# Patient Record
Sex: FEMALE | Race: BLACK OR AFRICAN AMERICAN | Employment: UNEMPLOYED | URBAN - METROPOLITAN AREA
[De-identification: names, ages, dates, MRNs, and addresses within clinical notes are randomized per-mention and may not be internally consistent; named-entity substitution may affect disease eponyms.]

---

## 2017-01-09 ENCOUNTER — OFFICE VISIT (OUTPATIENT)
Dept: PAIN MANAGEMENT | Age: 62
End: 2017-01-09

## 2017-01-09 VITALS
WEIGHT: 174 LBS | BODY MASS INDEX: 32.02 KG/M2 | HEIGHT: 62 IN | SYSTOLIC BLOOD PRESSURE: 157 MMHG | DIASTOLIC BLOOD PRESSURE: 101 MMHG | HEART RATE: 76 BPM

## 2017-01-09 DIAGNOSIS — M51.37 DEGENERATION OF LUMBAR OR LUMBOSACRAL INTERVERTEBRAL DISC: ICD-10-CM

## 2017-01-09 DIAGNOSIS — M54.16 LUMBAR NEURITIS: ICD-10-CM

## 2017-01-09 DIAGNOSIS — G89.29 CHRONIC RIGHT-SIDED LOW BACK PAIN WITHOUT SCIATICA: ICD-10-CM

## 2017-01-09 DIAGNOSIS — M96.1 LUMBAR POST-LAMINECTOMY SYNDROME: ICD-10-CM

## 2017-01-09 DIAGNOSIS — M47.816 LUMBAR FACET ARTHROPATHY: Primary | ICD-10-CM

## 2017-01-09 DIAGNOSIS — M25.50 PAIN IN JOINT, MULTIPLE SITES: ICD-10-CM

## 2017-01-09 DIAGNOSIS — M54.50 CHRONIC RIGHT-SIDED LOW BACK PAIN WITHOUT SCIATICA: ICD-10-CM

## 2017-01-09 DIAGNOSIS — M25.519 ARTHRALGIA OF SHOULDER, UNSPECIFIED LATERALITY: ICD-10-CM

## 2017-01-09 DIAGNOSIS — M96.1 POSTLAMINECTOMY SYNDROME, LUMBAR REGION: ICD-10-CM

## 2017-01-09 RX ORDER — OXYCODONE AND ACETAMINOPHEN 10; 325 MG/1; MG/1
1 TABLET ORAL
Qty: 90 TAB | Refills: 0 | Status: SHIPPED | OUTPATIENT
Start: 2017-02-24 | End: 2017-02-16 | Stop reason: SDUPTHER

## 2017-01-09 RX ORDER — GABAPENTIN 300 MG/1
300 CAPSULE ORAL 3 TIMES DAILY
Qty: 90 CAP | Refills: 5 | Status: SHIPPED | OUTPATIENT
Start: 2017-01-09 | End: 2017-03-27

## 2017-01-09 RX ORDER — OXYCODONE AND ACETAMINOPHEN 10; 325 MG/1; MG/1
1 TABLET ORAL
Qty: 90 TAB | Refills: 0 | Status: SHIPPED | OUTPATIENT
Start: 2017-01-26 | End: 2017-01-09 | Stop reason: SDUPTHER

## 2017-01-09 RX ORDER — FENTANYL 75 UG/H
1 PATCH TRANSDERMAL
Qty: 15 PATCH | Refills: 0 | Status: SHIPPED | OUTPATIENT
Start: 2017-02-09 | End: 2017-02-16 | Stop reason: DRUGHIGH

## 2017-01-09 RX ORDER — FENTANYL 75 UG/H
1 PATCH TRANSDERMAL
Qty: 15 PATCH | Refills: 0 | Status: SHIPPED | OUTPATIENT
Start: 2017-01-11 | End: 2017-01-09 | Stop reason: SDUPTHER

## 2017-01-09 NOTE — PROGRESS NOTES
HISTORY OF PRESENT ILLNESS  Trini Bonilla is a 63 y.o. female. Patient presents for follow up of chronic pain due to lumbar postlaminectomy syndrome and polyarthralgias, related to an industrial accident in [facility]. She is also s/p right-sided mastectomy secondary to invasive ductal carcinoma. She is s/p lumbar fusion revision with Dr. Ratna Hernandez on 5/3/16. She recently underwent left sided lumbar RFA with Dr. Howard Vernon on 12/19 and is slated for the right side on 1/12/17. She has noted already significant improvement in left sided back pain, but she notes that right sided back pain remains very high. She describes this pain as aching, stabbing, stiff, and tender. This has improved since surgery, however. Pt reports average of 5-6/10 pain scale most days, but reports that with medications, her pain will reduce to 3-4/10. We will re-evaluate 4 weeks after her right sided RFA to start weaning down on her medications, which has been high on the priority list for her worker's compensation insurance company. Medications continue to work well for pain control overall. Trini Bonilla is tolerating medications well, with no untoward side effects noted. She is able to stay more active with less discomfort with these current doses. The patient reports an average of 70% relief with this regimen. Most recent UDS and  were consistent with prescribed medications. Pill counts are appropriate. She is informed of side effects, risks, and benefits of this regimen, and emphasizes that she derives a significant improvement in functionality and quality of life, and notes that non-opioid medications and therapies in the past have not offered significant benefit. She denies new or worsening insomnia or constipation issues. She denies any falls, injuries, or hospitalizations since the last visit. HPI--see above    ROS  Constitutional: Positive for fever, chills and malaise/fatigue. HENT: Positive for neck pain.     Musculoskeletal: Positive for myalgias, back pain and joint pain. Neurological: Positive for headaches. Psychiatric/Behavioral: Negative for depression. The patient is not nervous/anxious and does not have insomnia. Physical Exam  Constitutional: She is oriented to person, place, and time. She appears well-developed and well-nourished. in better spirits  Musculoskeletal:   Marked spasms of cervical paraspinous and trapezius musculature noted        Right shoulder: She exhibits tenderness. Left shoulder: She exhibits tenderness. Lumbar back: She exhibits decreased range of motion, tenderness, pain and spasm. Back:           Arms:  Brisk sacroiliac tenderness noted  Marked spasms of lumbar paraspinous musculature noted  Wearing back brace       Right hand: She exhibits tenderness. Left hand: She exhibits tenderness. Neurological: She is alert and oriented to person, place, and time. No cranial nerve deficit. She exhibits normal muscle tone. Coordination normal.   Psychiatric: She has a normal mood and affect. Her behavior is normal. Judgment and thought content normal.     ASSESSMENT and PLAN    ICD-10-CM ICD-9-CM    1. Lumbar facet arthropathy (HCC) M12.88 721.3    2. Chronic right-sided low back pain without sciatica M54.5 724.2     G89.29 338.29    3. Lumbar neuritis M54.16 724.4    4. Postlaminectomy syndrome, lumbar region M96.1 722.83    5. Degeneration of lumbar intervertebral disc M51.37 722.52    6. Pain in joint, multiple sites M25.50 719.49    7. Arthralgia of shoulder, unspecified laterality M25.519 719.41    8.  Lumbar post-laminectomy syndrome M96.1 722.83       Plan:  Continue same medications as prescribed for chronic pain  Follow up in 3 months or sooner if needed  Regular exercise and attention to emotional health and diet remain the most effective ways to treat chronic pain of all kinds  You may contact me with questions or concerns through [address]

## 2017-01-09 NOTE — MR AVS SNAPSHOT
Visit Information Date & Time Provider Department Dept. Phone Encounter #  
 1/9/2017  4:20 PM Juliana Cavanaugh [address] for [facility] [id number] Follow-up Instructions Return in about 6 weeks (around 2/20/2017). Follow-up and Disposition History Your Appointments 1/9/2017  4:20 PM  
Follow Up with Marysol Kothari PA-C [address] for [facility] (WOLF SCHEDULING) Appt Note: 2 mth f/u; 2 mth f/u  
 [address] [facility] [address]  
[phone]  [address] [id number] [id number] Upcoming Health Maintenance Date Due Hepatitis C Screening 1955 Pneumococcal 19-64 Highest Risk (1 of 3 - PCV13) 12/23/1976 BREAST CANCER SCRN MAMMOGRAM 10/29/2015 INFLUENZA AGE 9 TO ADULT 8/1/2016 PAP AKA CERVICAL CYTOLOGY 12/13/2019 COLONOSCOPY 3/15/2023 DTaP/Tdap/Td series (2 - Td) 4/1/2025 Allergies as of 1/9/2017  Review Complete On: 1/9/2017 By: Fariha Muñiz LPN Severity Noted Reaction Type Reactions Adhesive    Rash Aspirin  06/20/2011    Other (comments), Nausea and Vomiting G6PD 
GI BLEED AND ULCER Contrast Agent [Iodine]    Rash Allergic to CT Dye  
 Dilantin [Phenytoin Sodium Extended]    Other (comments) Difficulty waking Iodinated Contrast Media - Oral And Iv Dye  05/03/2016    Rash \"bumps on face\" Lipitor [Atorvastatin]  12/13/2016    Other (comments) PKU Pcn [Penicillins]    Rash, Hives \"sores all over the body\" Phenytoin  05/03/2016    Unknown (comments) \"Trouble waking up\" Sulfa (Sulfonamide Antibiotics)    Rash, Nausea and Vomiting G6PD 
G6PD Tegretol [Carbamazepine]    Other (comments), Vertigo \"Trouble waking up\" Difficulty waking up Current Immunizations  Reviewed on 11/25/2015 Name Date Influenza Vaccine 11/25/2015  7:48 AM  
 Tdap 4/1/2015 Not reviewed this visit You Were Diagnosed With   
  
 Codes Comments Lumbar facet arthropathy (HCC)    -  Primary ICD-10-CM: M12.88 ICD-9-CM: 101. 3 Chronic right-sided low back pain without sciatica     ICD-10-CM: M54.5, G89.29 ICD-9-CM: 724.2, 338.29 Lumbar neuritis     ICD-10-CM: M54.16 
ICD-9-CM: 724.4 Postlaminectomy syndrome, lumbar region     ICD-10-CM: M96.1 ICD-9-CM: 722.83 Degeneration of lumbar or lumbosacral intervertebral disc     ICD-10-CM: M51.37 
ICD-9-CM: 722.52 Pain in joint, multiple sites     ICD-10-CM: M25.50 ICD-9-CM: 719.49 Arthralgia of shoulder, unspecified laterality     ICD-10-CM: M25.519 ICD-9-CM: 719.41 Lumbar post-laminectomy syndrome     ICD-10-CM: M96.1 ICD-9-CM: 722.83 Vitals BP Pulse Height(growth percentile) Weight(growth percentile) LMP BMI  
 (!) 157/101 76 5' 2\" (1.575 m) 174 lb (78.9 kg) 08/28/2002 31.83 kg/m2 OB Status Smoking Status Postmenopausal Never Smoker Vitals History BMI and BSA Data Body Mass Index Body Surface Area  
 31.83 kg/m 2 1.86 m 2 Preferred Pharmacy Pharmacy Name Phone [facility] [place] [address] [phone] [facility] [phone] Your Updated Medication List  
  
   
This list is accurate as of: 1/9/17  2:16 PM.  Always use your most recent med list.  
  
  
  
  
 acetaminophen 500 mg tablet Commonly known as:  TYLENOL Take  by mouth every six (6) hours as needed for Pain. CLARITIN 10 mg tablet Generic drug:  loratadine Take 10 mg by mouth daily as needed. doxycycline 100 mg capsule Commonly known as:  VIBRAMYCIN Take 1 capsule twice daily with food for 10 days, remain upright for 45 minutes after each dose. ergocalciferol 50,000 unit capsule Commonly known as:  ERGOCALCIFEROL Take 1 Cap by mouth every seven (7) days. fentaNYL 75 mcg/hr Commonly known as:  Emily Kramer  
 1 Patch by TransDERmal route every fourty-eight (48) hours for 30 days. Max Daily Amount: 1 Patch. for chronic, severe, refractory pain. Mylan brand only  Indications: CHRONIC PAIN WITH OPIOID TOLERANCE, SEVERE PAIN WITH OPIOID TOLERANCE Start taking on:    
  
 folic acid 1 mg tablet Commonly known as:  Google Take 1 mg by mouth two (2) times a day.  
  
 gabapentin 300 mg capsule Commonly known as:  NEURONTIN Take 1 Cap by mouth three (3) times daily. As needed for nerve pain  
  
 levothyroxine 100 mcg tablet Commonly known as:  SYNTHROID  
TAKE 1 TABLET BY MOUTH DAILY (BEFORE BREAKFAST) methocarbamol 750 mg tablet Commonly known as:  ROBAXIN Take 1 Tab by mouth three (3) times daily. As needed for muscle spasms NASONEX 50 mcg/actuation nasal spray Generic drug:  mometasone 2 Sprays daily as needed. ondansetron hcl 8 mg tablet Commonly known as:  Ricke Beagle Take 8 mg by mouth. OTHER Apply  to affected area three (3) times daily as needed. VA-CMOD1 compounded cream from [facility]  
  
 oxyCODONE-acetaminophen  mg per tablet Commonly known as:  PERCOCET 10 Take 1 Tab by mouth three (3) times daily as needed for up to 30 days. Max Daily Amount: 3 Tabs. For breakthrough pain  Indications: PAIN Start taking on:  2017  
  
 senna-docusate 8.6-50 mg per tablet Commonly known as:  Joelyn Muster Take 2 Tabs by mouth daily. For chronic constipation Transparent Dressings 3 1/2 X 4 \" Bndg Commonly known as:  TEGADERM  
1 Patch by Apply Externally route every fourty-eight (48) hours. ZANTAC 150 mg tablet Generic drug:  raNITIdine Take 150 mg by mouth daily as needed. Prescriptions Printed Refills Transparent Dressings (TEGADERM) 3 1/2 X 4 \" bndg 5 Si Patch by Apply Externally route every fourty-eight (48) hours. Class: Print  Route: Apply Externally  
 fentaNYL (DURAGESIC) 75 mcg/hr 0  
 Starting on: 2017 Si Patch by TransDERmal route every fourty-eight (48) hours for 30 days. Max Daily Amount: 1 Patch. for chronic, severe, refractory pain. Mylan brand only  Indications: CHRONIC PAIN WITH OPIOID TOLERANCE, SEVERE PAIN WITH OPIOID TOLERANCE Class: Print Route: TransDERmal  
 oxyCODONE-acetaminophen (PERCOCET 10)  mg per tablet 0 Starting on: 2017 Sig: Take 1 Tab by mouth three (3) times daily as needed for up to 30 days. Max Daily Amount: 3 Tabs. For breakthrough pain  Indications: PAIN Class: Print Route: Oral  
  
Prescriptions Sent to Pharmacy Refills  
 gabapentin (NEURONTIN) 300 mg capsule 5 Sig: Take 1 Cap by mouth three (3) times daily. As needed for nerve pain  
 Class: Normal  
 Pharmacy: [facility]/pharmacy #[id number]- [place], [address] [place] [address] #: [phone] Route: Oral  
  
Follow-up Instructions Return in about 6 weeks (around 2017). Patient Instructions Plan: 
Continue same medications as prescribed for chronic pain Follow up in 3 months or sooner if needed Regular exercise and attention to emotional health and diet remain the most effective ways to treat chronic pain of all kinds You may contact me with questions or concerns through [address] Introducing [facility]! [facility] introduces [employer] patient portal. Now you can access parts of your medical record, email your doctor's office, and request medication refills online. 1. In your internet browser, go to [email or web address] 2. Click on the First Time User? Click Here link in the Sign In box. You will see the New Member Sign Up page. 3. Enter your [employer] Access Code exactly as it appears below. You will not need to use this code after youve completed the sign-up process. If you do not sign up before the expiration date, you must request a new code.  
 
· [employer] Access Code: [email or web address] 
 Expires: 1/19/2017  2:38 PM 
 
4. Enter the last four digits of your Social Security Number (xxxx) and Date of Birth (mm/dd/yyyy) as indicated and click Submit. You will be taken to the next sign-up page. 5. Create a [employer] ID. This will be your [employer] login ID and cannot be changed, so think of one that is secure and easy to remember. 6. Create a [employer] password. You can change your password at any time. 7. Enter your Password Reset Question and Answer. This can be used at a later time if you forget your password. 8. Enter your e-mail address. You will receive e-mail notification when new information is available in [address] 9. Click Sign Up. You can now view and download portions of your medical record. 10. Click the Download Summary menu link to download a portable copy of your medical information. If you have questions, please visit the Frequently Asked Questions section of the [employer] website. Remember, [employer] is NOT to be used for urgent needs. For medical emergencies, dial 911. Now available from your iPhone and Android! Please provide this summary of care documentation to your next provider. Your primary care clinician is listed as Mahesh 51. If you have any questions after today's visit, please call [phone].

## 2017-01-09 NOTE — PROGRESS NOTES
Nursing Notes    Patient presents to the office today in follow-up. Patient rates her pain at 6/10 on the numerical pain scale. Reviewed medications with counts as follows:    Rx Date filled Qty Dispensed Pill Count Last Dose Short   Fentanyl 75 mcg/hr  12/15/16 15 3 Current patch on right side of torso no   Percocet 10/325 mg 12/28/16 90 60 today no                            POC UDS was not performed in office today. Any new labs or imaging since last appointment? NO    Have you been to an emergency room (ER) or urgent care clinic since your last visit? YES. Pt went to an urgent care for whole body pain, dyspnea, SOB. Have you been hospitalized since your last visit? NO     If yes, where, when, and reason for visit? Have you seen or consulted any other health care providers outside of the [address] [place] [address]  since your last visit? YES     If yes, where, when, and reason for visit? Dr. Vernell Rodriguez      deferred to pcp.

## 2017-02-09 ENCOUNTER — OFFICE VISIT (OUTPATIENT)
Dept: FAMILY MEDICINE CLINIC | Age: 62
End: 2017-02-09

## 2017-02-09 VITALS
HEIGHT: 62 IN | BODY MASS INDEX: 32.02 KG/M2 | SYSTOLIC BLOOD PRESSURE: 142 MMHG | HEART RATE: 85 BPM | OXYGEN SATURATION: 97 % | RESPIRATION RATE: 16 BRPM | DIASTOLIC BLOOD PRESSURE: 90 MMHG | WEIGHT: 174 LBS | TEMPERATURE: 98.2 F

## 2017-02-09 DIAGNOSIS — J01.00 ACUTE NON-RECURRENT MAXILLARY SINUSITIS: Primary | ICD-10-CM

## 2017-02-09 DIAGNOSIS — J30.1 SEASONAL ALLERGIC RHINITIS DUE TO POLLEN: ICD-10-CM

## 2017-02-09 RX ORDER — LEVOFLOXACIN 500 MG/1
500 TABLET, FILM COATED ORAL DAILY
Qty: 7 TAB | Refills: 0 | Status: SHIPPED | OUTPATIENT
Start: 2017-02-09 | End: 2017-02-16

## 2017-02-09 RX ORDER — FLUTICASONE PROPIONATE 50 MCG
SPRAY, SUSPENSION (ML) NASAL
Qty: 1 BOTTLE | Refills: 2 | Status: SHIPPED | OUTPATIENT
Start: 2017-02-09 | End: 2017-08-04 | Stop reason: ALTCHOICE

## 2017-02-09 NOTE — PROGRESS NOTES
Sarah Rider is a 63 y.o. female sinus pain and cough thick mucus     1. Have you been to the ER, urgent care clinic or hospitalized since your last visit? NO.     2. Have you seen or consulted any other health care providers outside of the [address] [place] [address] since your last visit (Include any pap smears or colon screening)? NO      Do you have an Advanced Directive? NO    Would you like information on Advanced Directives?  NO    Learning Assessment 3/10/2015   PRIMARY LEARNER Patient   HIGHEST LEVEL OF EDUCATION - PRIMARY LEARNER  > 4 YEARS OF COLLEGE   BARRIERS PRIMARY LEARNER NONE   CO-LEARNER CAREGIVER No   PRIMARY LANGUAGE ENGLISH   LEARNER PREFERENCE PRIMARY DEMONSTRATION   ANSWERED BY self   RELATIONSHIP SELF

## 2017-02-09 NOTE — MR AVS SNAPSHOT
Visit Information Date & Time Provider Department Dept. Phone Encounter #  
 2/9/2017  3:15 PM Reina Gamboa, Coreen [facility] [phone] [id number] Your Appointments 2/16/2017  1:20 PM  
Follow Up with Amy Pozo PA-C [address] [place] [address] for Pain Management (WOLF SCHEDULING) Appt Note: Return in about 6 weeks (around 2/20/2017; Fidel F/U w/Provider [facility] [address]  
[phone] [address]  
  
    
 2/16/2017  2:00 PM  
Follow Up with Amy Pozo PA-C [address] [place] [address] for Pain Management (WOLF SCHEDULING) Appt Note:  slot [address] [facility] [address] [phone] Upcoming Health Maintenance Date Due Hepatitis C Screening 1955 Pneumococcal 19-64 Highest Risk (1 of 3 - PCV13) 12/23/1976 BREAST CANCER SCRN MAMMOGRAM 10/29/2015 INFLUENZA AGE 9 TO ADULT 8/1/2016 PAP AKA CERVICAL CYTOLOGY 12/13/2019 COLONOSCOPY 3/15/2023 DTaP/Tdap/Td series (2 - Td) 4/1/2025 Allergies as of 2/9/2017  Review Complete On: 2/9/2017 By: Donna Hall LPN Severity Noted Reaction Type Reactions Adhesive    Rash Aspirin  06/20/2011    Other (comments), Nausea and Vomiting G6PD 
GI BLEED AND ULCER Contrast Agent [Iodine]    Rash Allergic to CT Dye  
 Dilantin [Phenytoin Sodium Extended]    Other (comments) Difficulty waking Iodinated Contrast Media - Oral And Iv Dye  05/03/2016    Rash \"bumps on face\" Lipitor [Atorvastatin]  12/13/2016    Other (comments) PKU Pcn [Penicillins]    Rash, Hives \"sores all over the body\" Phenytoin  05/03/2016    Unknown (comments) \"Trouble waking up\" Sulfa (Sulfonamide Antibiotics)    Rash, Nausea and Vomiting G6PD 
G6PD Tegretol [Carbamazepine]    Other (comments), Vertigo \"Trouble waking up\" Difficulty waking up Current Immunizations  Reviewed on 11/25/2015 Name Date Influenza Vaccine 11/25/2015  7:48 AM  
 Tdap 4/1/2015 Not reviewed this visit You Were Diagnosed With   
  
 Codes Comments Acute non-recurrent maxillary sinusitis    -  Primary ICD-10-CM: J01.00 ICD-9-CM: 461.0 Seasonal allergic rhinitis due to pollen     ICD-10-CM: J30.1 ICD-9-CM: 477.0 Vitals BP Pulse Temp Resp Height(growth percentile) Weight(growth percentile) 142/90 (BP 1 Location: Left arm) 85 98.2 °F (36.8 °C) (Oral) 16 5' 2\" (1.575 m) 174 lb (78.9 kg) LMP SpO2 BMI OB Status Smoking Status 08/28/2002 97% 31.83 kg/m2 Postmenopausal Never Smoker Vitals History BMI and BSA Data Body Mass Index Body Surface Area  
 31.83 kg/m 2 1.86 m 2 Preferred Pharm[facility] [address] [phone] Your Updated Medication List  
  
   
This list is accurate as of: 2/9/17  3:50 PM.  Always use your most recent med list.  
  
  
  
  
 acetaminophen 500 mg tablet Commonly known as:  TYLENOL Take  by mouth every six (6) hours as needed for Pain. CLARITIN 10 mg tablet Generic drug:  loratadine Take 10 mg by mouth daily as needed. doxycycline 100 mg capsule Commonly known as:  VIBRAMYCIN Take 1 capsule twice daily with food for 10 days, remain upright for 45 minutes after each dose. ergocalciferol 50,000 unit capsule Commonly known as:  ERGOCALCIFEROL Take 1 Cap by mouth every seven (7) days. fentaNYL 75 mcg/hr Commonly known as:  DURAGESIC  
1 Patch by TransDERmal route every fourty-eight (48) hours for 30 days. Max Daily Amount: 1 Patch. for chronic, severe, refractory pain. Mylan brand only  Indications: CHRONIC PAIN WITH OPIOID TOLERANCE, SEVERE PAIN WITH OPIOID TOLERANCE  
  
 fluticasone 50 mcg/actuation nasal spray Commonly known as:  Denzel Sacks Two sprays in each nostril daily. folic acid 1 mg tablet Commonly known as:  Google Take 1 mg by mouth two (2) times a day.  
  
 gabapentin 300 mg capsule Commonly known as:  NEURONTIN Take 1 Cap by mouth three (3) times daily. As needed for nerve pain  
  
 levoFLOXacin 500 mg tablet Commonly known as:  Fernando Garcia Take 1 Tab by mouth daily for 7 days. levothyroxine 100 mcg tablet Commonly known as:  SYNTHROID  
TAKE 1 TABLET BY MOUTH DAILY (BEFORE BREAKFAST) methocarbamol 750 mg tablet Commonly known as:  ROBAXIN Take 1 Tab by mouth three (3) times daily. As needed for muscle spasms NASONEX 50 mcg/actuation nasal spray Generic drug:  mometasone 2 Sprays daily as needed. ondansetron hcl 8 mg tablet Commonly known as:  Marrion Mend Take 8 mg by mouth. OTHER Apply  to affected area three (3) times daily as needed. VA-CMOD1 compounded cream from [facility]  
  
 oxyCODONE-acetaminophen  mg per tablet Commonly known as:  PERCOCET 10 Take 1 Tab by mouth three (3) times daily as needed for up to 30 days. Max Daily Amount: 3 Tabs. For breakthrough pain  Indications: PAIN Start taking on:  2/24/2017  
  
 senna-docusate 8.6-50 mg per tablet Commonly known as:  Leellen Cancel Take 2 Tabs by mouth daily. For chronic constipation Transparent Dressings 3 1/2 X 4 \" Bndg Commonly known as:  TEGADERM  
1 Patch by Apply Externally route every fourty-eight (48) hours. ZANTAC 150 mg tablet Generic drug:  raNITIdine Take 150 mg by mouth daily as needed. Prescriptions Sent to Pharmacy Refills  
 levoFLOXacin (LEVAQUIN) 500 mg tablet 0 Sig: Take 1 Tab by mouth daily for 7 days. Class: Normal  
 Pharmacy: [address] [facility], [address] [facility] #: [phone] Route: Oral  
 fluticasone (FLONASE) 50 mcg/actuation nasal spray 2 Sig: Two sprays in each nostril daily. Class: Normal  
 [facility] [address] [facility] [address] [facility] [phone] Patient Instructions Sinusitis: Care Instructions Your Care Instructions Sinusitis is an infection of the lining of the sinus cavities in your head. Sinusitis often follows a cold. It causes pain and pressure in your head and face. In most cases, sinusitis gets better on its own in 1 to 2 weeks. But some mild symptoms may last for several weeks. Sometimes antibiotics are needed. Follow-up care is a key part of your treatment and safety. Be sure to make and go to all appointments, and call your doctor if you are having problems. It's also a good idea to know your test results and keep a list of the medicines you take. How can you care for yourself at home? · Take an over-the-counter pain medicine, such as acetaminophen (Tylenol), ibuprofen (Advil, Motrin), or naproxen (Aleve). Read and follow all instructions on the label. · If the doctor prescribed antibiotics, take them as directed. Do not stop taking them just because you feel better. You need to take the full course of antibiotics. · Be careful when taking over-the-counter cold or flu medicines and Tylenol at the same time. Many of these medicines have acetaminophen, which is Tylenol. Read the labels to make sure that you are not taking more than the recommended dose. Too much acetaminophen (Tylenol) can be harmful. · Breathe warm, moist air from a steamy shower, a hot bath, or a sink filled with hot water. Avoid cold, dry air. Using a humidifier in your home may help. Follow the directions for cleaning the machine. · Use saline (saltwater) nasal washes to help keep your nasal passages open and wash out mucus and bacteria. You can buy saline nose drops at a grocery store or drugstore.  Or you can make your own at home by adding 1 teaspoon of salt and 1 teaspoon of baking soda to 2 cups of distilled water. If you make your own, fill a bulb syringe with the solution, insert the tip into your nostril, and squeeze gently. F[place] your nose. · Put a hot, wet towel or a warm gel pack on your face 3 or 4 times a day for 5 to 10 minutes each time. · Try a decongestant nasal spray like oxymetazoline (Afrin). Do not use it for more than 3 days in a row. Using it for more than 3 days can make your congestion worse. When should you call for help? Call your doctor now or seek immediate medical care if: 
· You have new or worse swelling or redness in your face or around your eyes. · You have a new or higher fever. Watch closely for changes in your health, and be sure to contact your doctor if: 
· You have new or worse facial pain. · The mucus from your nose becomes thicker (like pus) or has new blood in it. · You are not getting better as expected. Where can you learn more? Go to http://celia-staci.info/. Enter W757 in the search box to learn more about \"Sinusitis: Care Instructions. \" Current as of: July 29, 2016 Content Version: 11.1 © 9617-1315 SpotMe. Care instructions adapted under license by [employer] (which disclaims liability or warranty for this information). If you have questions about a medical condition or this instruction, always ask your healthcare professional. [place] [address] any warranty or liability for your use of this information. Introducing [facility]! Aniyah Overton introduces All Together Now patient portal. Now you can access parts of your medical record, email your doctor's office, and request medication refills online. 1. In your internet browser, go to [email or web address] 2. Click on the First Time User? Click Here link in the Sign In box. You will see the New Member Sign Up page. 3. Enter your All Together Now Access Code exactly as it appears below.  You will not need to use this code after youve completed the sign-up process. If you do not sign up before the expiration date, you must request a new code. · Medigo Access Code: [email or web address] Expires: 4/23/2017  2:13 PM 
 
4. Enter the last four digits of your Social Security Number (xxxx) and Date of Birth (mm/dd/yyyy) as indicated and click Submit. You will be taken to the next sign-up page. 5. Create a Medigo ID. This will be your Medigo login ID and cannot be changed, so think of one that is secure and easy to remember. 6. Create a Medigo password. You can change your password at any time. 7. Enter your Password Reset Question and Answer. This can be used at a later time if you forget your password. 8. Enter your e-mail address. You will receive e-mail notification when new information is available in [address]. 9. Click Sign Up. You can now view and download portions of your medical record. 10. Click the Download Summary menu link to download a portable copy of your medical information. If you have questions, please visit the Frequently Asked Questions section of the Medigo website. Remember, Medigo is NOT to be used for urgent needs. For medical emergencies, dial 911. Now available from your iPhone and Android! Please provide this summary of care documentation to your next provider. Your primary care clinician is listed as Mahesh 51. If you have any questions after today's visit, please call [phone].

## 2017-02-09 NOTE — PATIENT INSTRUCTIONS
Sinusitis: Care Instructions  Your Care Instructions    Sinusitis is an infection of the lining of the sinus cavities in your head. Sinusitis often follows a cold. It causes pain and pressure in your head and face. In most cases, sinusitis gets better on its own in 1 to 2 weeks. But some mild symptoms may last for several weeks. Sometimes antibiotics are needed. Follow-up care is a key part of your treatment and safety. Be sure to make and go to all appointments, and call your doctor if you are having problems. It's also a good idea to know your test results and keep a list of the medicines you take. How can you care for yourself at home? · Take an over-the-counter pain medicine, such as acetaminophen (Tylenol), ibuprofen (Advil, Motrin), or naproxen (Aleve). Read and follow all instructions on the label. · If the doctor prescribed antibiotics, take them as directed. Do not stop taking them just because you feel better. You need to take the full course of antibiotics. · Be careful when taking over-the-counter cold or flu medicines and Tylenol at the same time. Many of these medicines have acetaminophen, which is Tylenol. Read the labels to make sure that you are not taking more than the recommended dose. Too much acetaminophen (Tylenol) can be harmful. · Breathe warm, moist air from a steamy shower, a hot bath, or a sink filled with hot water. Avoid cold, dry air. Using a humidifier in your home may help. Follow the directions for cleaning the machine. · Use saline (saltwater) nasal washes to help keep your nasal passages open and wash out mucus and bacteria. You can buy saline nose drops at a grocery store or drugstore. Or you can make your own at home by adding 1 teaspoon of salt and 1 teaspoon of baking soda to 2 cups of distilled water. If you make your own, fill a bulb syringe with the solution, insert the tip into your nostril, and squeeze gently. [place] your nose.   · Put a hot, wet towel or a warm gel pack on your face 3 or 4 times a day for 5 to 10 minutes each time. · Try a decongestant nasal spray like oxymetazoline (Afrin). Do not use it for more than 3 days in a row. Using it for more than 3 days can make your congestion worse. When should you call for help? Call your doctor now or seek immediate medical care if:  · You have new or worse swelling or redness in your face or around your eyes. · You have a new or higher fever. Watch closely for changes in your health, and be sure to contact your doctor if:  · You have new or worse facial pain. · The mucus from your nose becomes thicker (like pus) or has new blood in it. · You are not getting better as expected. Where can you learn more? Go to http://celia-staci.info/. Enter Y203 in the search box to learn more about \"Sinusitis: Care Instructions. \"  Current as of: July 29, 2016  Content Version: 11.1  © 8055-2736 Multimedia Plus | [employer]. Care instructions adapted under license by op5 (which disclaims liability or warranty for this information). If you have questions about a medical condition or this instruction, always ask your healthcare professional. [place] [address] any warranty or liability for your use of this information.

## 2017-02-09 NOTE — PROGRESS NOTES
HISTORY OF PRESENT ILLNESS  Delroy Bunch is a 63 y.o. female. HPI patient reports on going sinus pressure, pain with colored nasal drainage the past 2 months unresponsive to OTC. She requested a same day appointment and presented 25 minutes late but we were able to fit her into the schedule. Symptom onset gradual , timing constant, pain mild to moderate. Past Medical History   Diagnosis Date    Anemia     Asthma      on allergy shots, no inhalaers    Back injury     Back pain     Breast cancer ([facility] [address] 12/6/2011     Dr. Keshia Tolentino; Dr. Marlen Garzon easily     Carpal tunnel syndrome, right      EMG done only in left however    Chronic pain      pelvic pain    Constipation     G6PD (glucose 6 phosphatase deficiency)     Gastroparesis     GERD (gastroesophageal reflux disease)      acid reflux    H/O colonoscopy 3/15/2013     Dr. Esperanza Rojas Hypertension      no mediacations, doctor is monitoring    Hyperthyroidism      Grave's- radiation    IBS (irritable bowel syndrome)     Lymphedema 2012     in her right underarm    Numbness      legs    Osteopenia 3/19/2013    Other and unspecified hyperlipidemia     PUD (peptic ulcer disease)      pyloric ulcer    Unspecified hypothyroidism        Current Outpatient Prescriptions:     levoFLOXacin (LEVAQUIN) 500 mg tablet, Take 1 Tab by mouth daily for 7 days. , Disp: 7 Tab, Rfl: 0    fluticasone (FLONASE) 50 mcg/actuation nasal spray, Two sprays in each nostril daily. , Disp: 1 Bottle, Rfl: 2    fentaNYL (DURAGESIC) 75 mcg/hr, 1 Patch by TransDERmal route every fourty-eight (48) hours for 30 days. Max Daily Amount: 1 Patch. for chronic, severe, refractory pain. Mylan brand only  Indications: CHRONIC PAIN WITH OPIOID TOLERANCE, SEVERE PAIN WITH OPIOID TOLERANCE, Disp: 15 Patch, Rfl: 0    [START ON 2/24/2017] oxyCODONE-acetaminophen (PERCOCET 10)  mg per tablet, Take 1 Tab by mouth three (3) times daily as needed for up to 30 days. Max Daily Amount: 3 Tabs. For breakthrough pain  Indications: PAIN, Disp: 90 Tab, Rfl: 0    gabapentin (NEURONTIN) 300 mg capsule, Take 1 Cap by mouth three (3) times daily. As needed for nerve pain, Disp: 90 Cap, Rfl: 5    levothyroxine (SYNTHROID) 100 mcg tablet, TAKE 1 TABLET BY MOUTH DAILY (BEFORE BREAKFAST), Disp: 90 Tab, Rfl: 2    senna-docusate (PERICOLACE) 8.6-50 mg per tablet, Take 2 Tabs by mouth daily. For chronic constipation, Disp: 60 Tab, Rfl: 5    methocarbamol (ROBAXIN) 750 mg tablet, Take 1 Tab by mouth three (3) times daily. As needed for muscle spasms, Disp: 90 Tab, Rfl: 5    ergocalciferol (ERGOCALCIFEROL) 50,000 unit capsule, Take 1 Cap by mouth every seven (7) days. , Disp: 4 Cap, Rfl: 11    ondansetron hcl (ZOFRAN) 8 mg tablet, Take 8 mg by mouth., Disp: , Rfl:     mometasone (NASONEX) 50 mcg/actuation nasal spray, 2 Sprays daily as needed. , Disp: , Rfl:     OTHER, Apply  to affected area three (3) times daily as needed. VA-CMOD1 compounded cream from [facility], Disp: , Rfl:     loratadine (CLARITIN) 10 mg tablet, Take 10 mg by mouth daily as needed. , Disp: , Rfl:     Transparent Dressings (TEGADERM) 3 1/2 X 4 \" bndg, 1 Patch by Apply Externally route every fourty-eight (48) hours. , Disp: 25 Each, Rfl: 5    doxycycline (VIBRAMYCIN) 100 mg capsule, Take 1 capsule twice daily with food for 10 days, remain upright for 45 minutes after each dose., Disp: 20 Cap, Rfl: 0    acetaminophen (TYLENOL) 500 mg tablet, Take  by mouth every six (6) hours as needed for Pain., Disp: , Rfl:     folic acid (FOLVITE) 1 mg tablet, Take 1 mg by mouth two (2) times a day., Disp: , Rfl:     ranitidine (ZANTAC) 150 mg tablet, Take 150 mg by mouth daily as needed. , Disp: , Rfl:     Review of Systems   Constitutional: Negative. HENT: Positive for congestion. Negative for sore throat. Eyes: Negative. Respiratory: Negative. Cardiovascular: Negative. Gastrointestinal: Negative. Musculoskeletal: Positive for myalgias. Negative for back pain, joint pain and neck pain. Skin: Negative. Neurological: Negative. All other systems reviewed and are negative. Physical Exam   Constitutional: She is oriented to person, place, and time. She appears well-developed and well-nourished. No distress. HENT:   Head: Normocephalic and atraumatic. Right Ear: External ear normal.   Left Ear: External ear normal.   Mouth/Throat: Oropharynx is clear and moist. No oropharyngeal exudate. Eyes: EOM are normal. Pupils are equal, round, and reactive to light. Right eye exhibits no discharge. Left eye exhibits no discharge. No scleral icterus. Sclera red, conjunctiva injected. Neck: Neck supple. No tracheal deviation present. Cardiovascular: Normal rate, regular rhythm, normal heart sounds and intact distal pulses. Exam reveals no gallop and no friction rub. No murmur heard. Pulmonary/Chest: Effort normal and breath sounds normal. No stridor. No respiratory distress. She has no wheezes. She has no rales. Abdominal: Soft. Bowel sounds are normal. She exhibits no distension. There is no tenderness. There is no rebound. Musculoskeletal: She exhibits no edema, tenderness or deformity. Lymphadenopathy:     She has no cervical adenopathy. Neurological: She is oriented to person, place, and time. No cranial nerve deficit. She exhibits normal muscle tone. Coordination normal.   Skin: Skin is warm and dry. No rash noted. She is not diaphoretic. No erythema. Psychiatric: She has a normal mood and affect. Her behavior is normal. Judgment and thought content normal.   Nursing note and vitals reviewed. ASSESSMENT and PLAN  1. Maxillary sinusitis  2. Allergic rhinitis   -Levaquin, Flonase rx; take as directed    Patient is in agreement with the treatment plan. Return to the clinic if needed. All questions answered and discharge instructions reviewed with the patient.

## 2017-02-16 ENCOUNTER — OFFICE VISIT (OUTPATIENT)
Dept: PAIN MANAGEMENT | Age: 62
End: 2017-02-16

## 2017-02-16 VITALS — DIASTOLIC BLOOD PRESSURE: 93 MMHG | RESPIRATION RATE: 17 BRPM | SYSTOLIC BLOOD PRESSURE: 154 MMHG | HEART RATE: 77 BPM

## 2017-02-16 DIAGNOSIS — R11.2 DRUG-INDUCED NAUSEA AND VOMITING: ICD-10-CM

## 2017-02-16 DIAGNOSIS — M54.50 CHRONIC RIGHT-SIDED LOW BACK PAIN WITHOUT SCIATICA: ICD-10-CM

## 2017-02-16 DIAGNOSIS — M96.1 LUMBAR POST-LAMINECTOMY SYNDROME: Primary | ICD-10-CM

## 2017-02-16 DIAGNOSIS — Z79.899 ENCOUNTER FOR LONG-TERM (CURRENT) USE OF HIGH-RISK MEDICATION: ICD-10-CM

## 2017-02-16 DIAGNOSIS — T45.1X5A CHEMOTHERAPY-INDUCED NEUROPATHY (HCC): ICD-10-CM

## 2017-02-16 DIAGNOSIS — M48.061 FORAMINAL STENOSIS OF LUMBAR REGION: ICD-10-CM

## 2017-02-16 DIAGNOSIS — K59.03 DRUG-INDUCED CONSTIPATION: ICD-10-CM

## 2017-02-16 DIAGNOSIS — T50.905A DRUG-INDUCED NAUSEA AND VOMITING: ICD-10-CM

## 2017-02-16 DIAGNOSIS — M46.1 BILATERAL SACROILIITIS (HCC): ICD-10-CM

## 2017-02-16 DIAGNOSIS — M47.816 LUMBAR FACET ARTHROPATHY: ICD-10-CM

## 2017-02-16 DIAGNOSIS — M51.37 DEGENERATION OF LUMBAR OR LUMBOSACRAL INTERVERTEBRAL DISC: ICD-10-CM

## 2017-02-16 DIAGNOSIS — M62.830 SPASM OF BACK MUSCLES: ICD-10-CM

## 2017-02-16 DIAGNOSIS — G89.29 CHRONIC RIGHT-SIDED LOW BACK PAIN WITHOUT SCIATICA: ICD-10-CM

## 2017-02-16 DIAGNOSIS — M54.16 LUMBAR NEURITIS: ICD-10-CM

## 2017-02-16 DIAGNOSIS — F51.04 CHRONIC INSOMNIA: ICD-10-CM

## 2017-02-16 DIAGNOSIS — G62.0 CHEMOTHERAPY-INDUCED NEUROPATHY (HCC): ICD-10-CM

## 2017-02-16 DIAGNOSIS — M15.9 GENERALIZED OSTEOARTHRITIS: ICD-10-CM

## 2017-02-16 DIAGNOSIS — M96.1 POSTLAMINECTOMY SYNDROME, LUMBAR REGION: ICD-10-CM

## 2017-02-16 DIAGNOSIS — M25.50 PAIN IN JOINT, MULTIPLE SITES: ICD-10-CM

## 2017-02-16 RX ORDER — OXYCODONE AND ACETAMINOPHEN 10; 325 MG/1; MG/1
1 TABLET ORAL
Qty: 90 TAB | Refills: 0 | Status: SHIPPED | OUTPATIENT
Start: 2017-03-01 | End: 2017-03-27

## 2017-02-16 RX ORDER — FENTANYL 62.5 UG/H
1 PATCH, EXTENDED RELEASE TRANSDERMAL
Qty: 15 PATCH | Refills: 0 | Status: SHIPPED | OUTPATIENT
Start: 2017-02-16 | End: 2017-03-27

## 2017-02-16 RX ORDER — TRAZODONE HYDROCHLORIDE 50 MG/1
100 TABLET ORAL
Qty: 60 TAB | Refills: 3 | Status: SHIPPED | OUTPATIENT
Start: 2017-02-16 | End: 2017-08-29 | Stop reason: SDUPTHER

## 2017-02-16 RX ORDER — DICLOFENAC SODIUM AND MISOPROSTOL 50; 200 MG/1; UG/1
1 TABLET, DELAYED RELEASE ORAL 2 TIMES DAILY
Qty: 60 TAB | Refills: 3 | Status: SHIPPED | OUTPATIENT
Start: 2017-02-16 | End: 2017-03-27

## 2017-02-16 NOTE — PROGRESS NOTES
Met separately with MsAna Cristina Jimbo's worker's comp  and discussed the issues and medication changes that we hashed out with Ms. Brian Ochoa at her visit. She is in agreement, and hopes to have Ms. Brian Ochoa off of opioids within the next few visits. This was discussed in detail with her . A total of 25 minutes was spent with the patient of which more than 50% of the time was spent counseling the patient.

## 2017-02-16 NOTE — PROGRESS NOTES
Nursing Notes    Patient presents to the office today in follow-up. Reviewed medications with counts as follows:    Rx Date filled Qty Dispensed Pill Count Last Dose Short   Fentanyl 75 mcg 2/10/17 15 11 1 on no   Percocet 10/325 1/31/17 90 66 today no   Diazepam 5 mg 2/8/17 90 85 today no   Ms. Julien Celaya has a reminder for a \"due or due soon\" health maintenance. I have asked that she contact her primary care provider for follow-up on this health maintenance. POC UDS was not performed in office today    Any new labs or imaging since last appointment? NO    Have you been to an emergency room (ER) or urgent care clinic since your last visit? NO            Have you been hospitalized since your last visit? NO     If yes, where, when, and reason for visit? Have you seen or consulted any other health care providers outside of [facility] [place]  since your last visit? NO     If yes, where, when, and reason for visit?

## 2017-02-16 NOTE — MR AVS SNAPSHOT
Visit Information Date & Time Provider Department Dept. Phone Encounter #  
 2/16/2017  2:00 PM [facility] [phone] Follow-up Instructions Return in about 6 weeks (around 3/30/2017). Upcoming Health Maintenance Date Due Hepatitis C Screening 1955 Pneumococcal 19-64 Highest Risk (1 of 3 - PCV13) 12/23/1976 BREAST CANCER SCRN MAMMOGRAM 10/29/2015 INFLUENZA AGE 9 TO ADULT 8/1/2016 PAP AKA CERVICAL CYTOLOGY 12/13/2019 COLONOSCOPY 3/15/2023 DTaP/Tdap/Td series (2 - Td) 4/1/2025 Allergies as of 2/16/2017  Review Complete On: 2/16/2017 By: Mirta Velazquez LPN Severity Noted Reaction Type Reactions Adhesive    Rash Aspirin  06/20/2011    Other (comments), Nausea and Vomiting G6PD 
GI BLEED AND ULCER Contrast Agent [Iodine]    Rash Allergic to CT Dye  
 Dilantin [Phenytoin Sodium Extended]    Other (comments) Difficulty waking Iodinated Contrast Media - Oral And Iv Dye  05/03/2016    Rash \"bumps on face\" Lipitor [Atorvastatin]  12/13/2016    Other (comments) PKU Pcn [Penicillins]    Rash, Hives \"sores all over the body\" Phenytoin  05/03/2016    Unknown (comments) \"Trouble waking up\" Sulfa (Sulfonamide Antibiotics)    Rash, Nausea and Vomiting G6PD 
G6PD Tegretol [Carbamazepine]    Other (comments), Vertigo \"Trouble waking up\" Difficulty waking up Current Immunizations  Reviewed on 11/25/2015 Name Date Influenza Vaccine 11/25/2015  7:48 AM  
 Tdap 4/1/2015 Not reviewed this visit You Were Diagnosed With   
  
 Codes Comments Lumbar post-laminectomy syndrome    -  Primary ICD-10-CM: M96.1 ICD-9-CM: 722.83 Chemotherapy-induced neuropathy (HCC)     ICD-10-CM: G62.0, T45.1X5A 
ICD-9-CM: 357.6, E933.1 Lumbar neuritis     ICD-10-CM: M54.16 
ICD-9-CM: 724.4 Chronic right-sided low back pain without sciatica     ICD-10-CM: M54.5, G89.29 ICD-9-CM: 724.2, 338.29 Postlaminectomy syndrome, lumbar region     ICD-10-CM: M96.1 ICD-9-CM: 722.83 Degeneration of lumbar or lumbosacral intervertebral disc     ICD-10-CM: M51.37 
ICD-9-CM: 722.52 Pain in joint, multiple sites     ICD-10-CM: M25.50 ICD-9-CM: 719.49 Generalized osteoarthritis     ICD-10-CM: M15.9 ICD-9-CM: 715.00 Bilateral sacroiliitis (HCC)     ICD-10-CM: M46.1 ICD-9-CM: 720.2 Lumbar facet arthropathy (HCC)     ICD-10-CM: M12.88 ICD-9-CM: 721.3 Encounter for long-term (current) use of high-risk medication     ICD-10-CM: Z79.899 ICD-9-CM: V58.69 Foraminal stenosis of lumbar region     ICD-10-CM: M99.83 ICD-9-CM: 724.02 Vitals LMP OB Status Smoking Status 08/28/2002 Postmenopausal Never Smoker Preferred Pharmacy Pharmacy Name Phone [facility] [id number] [place] [address] [phone] Your Updated Medication List  
  
   
This list is accurate as of: 2/16/17  2:13 PM.  Always use your most recent med list.  
  
  
  
  
 acetaminophen 500 mg tablet Commonly known as:  TYLENOL Take  by mouth every six (6) hours as needed for Pain. CLARITIN 10 mg tablet Generic drug:  loratadine Take 10 mg by mouth daily as needed. diclofenac-miSOPROStol  mg-mcg per tablet Commonly known as:  ARTHROTEC 50 Take 1 Tab by mouth two (2) times a day. As needed for back and arthritis pain  
  
 doxycycline 100 mg capsule Commonly known as:  VIBRAMYCIN Take 1 capsule twice daily with food for 10 days, remain upright for 45 minutes after each dose. ergocalciferol 50,000 unit capsule Commonly known as:  ERGOCALCIFEROL Take 1 Cap by mouth every seven (7) days. fentaNYL 62.5 mcg/hour Pt72  
1 Patch by TransDERmal route every fourty-eight (48) hours.  Max Daily Amount: 1 Patch. for chronic, severe, refractory pain. Note dose change  
  
 fluticasone 50 mcg/actuation nasal spray Commonly known as:  Eros Merles Two sprays in each nostril daily. folic acid 1 mg tablet Commonly known as:  Google Take 1 mg by mouth two (2) times a day.  
  
 gabapentin 300 mg capsule Commonly known as:  NEURONTIN Take 1 Cap by mouth three (3) times daily. As needed for nerve pain  
  
 levoFLOXacin 500 mg tablet Commonly known as:  Gale Guile Take 1 Tab by mouth daily for 7 days. levothyroxine 100 mcg tablet Commonly known as:  SYNTHROID  
TAKE 1 TABLET BY MOUTH DAILY (BEFORE BREAKFAST) methocarbamol 750 mg tablet Commonly known as:  ROBAXIN Take 1 Tab by mouth three (3) times daily. As needed for muscle spasms NASONEX 50 mcg/actuation nasal spray Generic drug:  mometasone 2 Sprays daily as needed. ondansetron hcl 8 mg tablet Commonly known as:  Karon Myers Take 8 mg by mouth. OTHER Apply  to affected area three (3) times daily as needed. VA-CMOD1 compounded cream from [facility]  
  
 oxyCODONE-acetaminophen  mg per tablet Commonly known as:  PERCOCET 10 Take 1 Tab by mouth three (3) times daily as needed for up to 30 days. Max Daily Amount: 3 Tabs. For breakthrough pain  Indications: Pain Start taking on:  3/1/2017  
  
 senna-docusate 8.6-50 mg per tablet Commonly known as:  Liana Distad Take 2 Tabs by mouth daily. For chronic constipation Transparent Dressings 3 1/2 X 4 \" Bndg Commonly known as:  TEGADERM  
1 Patch by Apply Externally route every fourty-eight (48) hours. traZODone 50 mg tablet Commonly known as:  Cornelious Haymaker Take 2 Tabs by mouth nightly. As needed for insomnia ZANTAC 150 mg tablet Generic drug:  raNITIdine Take 150 mg by mouth daily as needed. Prescriptions Printed Refills  
 fentaNYL 62.5 mcg/hour pt72 0 Si Patch by TransDERmal route every fourty-eight (48) hours. Max Daily Amount: 1 Patch. for chronic, severe, refractory pain. Note dose change Class: Print Route: TransDERmal  
 oxyCODONE-acetaminophen (PERCOCET 10)  mg per tablet 0 Starting on: 3/1/2017 Sig: Take 1 Tab by mouth three (3) times daily as needed for up to 30 days. Max Daily Amount: 3 Tabs. For breakthrough pain  Indications: Pain Class: Print Route: Oral  
  
Prescriptions Sent to Pharmacy Refills  
 diclofenac-miSOPROStol (ARTHROTEC 50)  mg-mcg per tablet 3 Sig: Take 1 Tab by mouth two (2) times a day. As needed for back and arthritis pain  
 Class: Normal  
 Pharmacy: [facility]/pharmacy #[id number] Ph #: [phone] Route: Oral  
 traZODone (DESYREL) 50 mg tablet 3 Sig: Take 2 Tabs by mouth nightly. As needed for insomnia Class: Normal  
 Pharmacy: [facility]/pharmacy #[id number] [address] Ph #: [phone] Route: Oral  
  
Follow-up Instructions Return in about 6 weeks (around 3/30/2017). Patient Instructions Plan: 
Continue same medications as prescribed for chronic pain Reduce Fentanyl to 62.5 mcg/hr every two days for one month We will add Arthrotec (diclofenac/ misoprostol) twice daily as needed for arthritis and back pain. Take with food For sleep, we will add Trazodone 50 mg. Take 1-2 tablets nightly as needed Follow up in 1 months or sooner if needed Regular exercise and attention to emotional health and diet remain the most effective ways to treat chronic pain of all kinds You may contact me with questions or concerns through [address] Introducing [facility]! Don Angelo introduces [employer] patient portal. Now you can access parts of your medical record, email your doctor's office, and request medication refills online. 1. In your internet browser, go to [email or web address]. [email or web address] 2. Click on the First Time User? Click Here link in the Sign In box. You will see the New Member Sign Up page. 3. Enter your [facility] Access Code exactly as it appears below. You will not need to use this code after youve completed the sign-up process. If you do not sign up before the expiration date, you must request a new code. · [facility] Access Code: [email or web address] Expires: 4/23/2017  2:13 PM 
 
4. Enter the last four digits of your Social Security Number (xxxx) and Date of Birth (mm/dd/yyyy) as indicated and click Submit. You will be taken to the next sign-up page. 5. Create a [facility] ID. This will be your [facility] login ID and cannot be changed, so think of one that is secure and easy to remember. 6. Create a [facility] password. You can change your password at any time. 7. Enter your Password Reset Question and Answer. This can be used at a later time if you forget your password. 8. Enter your e-mail address. You will receive e-mail notification when new information is available in [address] 9. Click Sign Up. You can now view and download portions of your medical record. 10. Click the Download Summary menu link to download a portable copy of your medical information. If you have questions, please visit the Frequently Asked Questions section of the [facility] website. Remember, [facility] is NOT to be used for urgent needs. For medical emergencies, dial 911. Now available from your iPhone and Android! Please provide this summary of care documentation to your next provider. Your primary care clinician is listed as [id number]. If you have any questions after today's visit, please call [phone].

## 2017-02-16 NOTE — PATIENT INSTRUCTIONS
Plan:  Continue same medications as prescribed for chronic pain  Reduce Fentanyl to 62.5 mcg/hr every two days for one month  We will add Arthrotec (diclofenac/ misoprostol) twice daily as needed for arthritis and back pain. Take with food  For sleep, we will add Trazodone 50 mg.  Take 1-2 tablets nightly as needed  Follow up in 1 months or sooner if needed  Regular exercise and attention to emotional health and diet remain the most effective ways to treat chronic pain of all kinds  You may contact me with questions or concerns through [address]

## 2017-02-16 NOTE — PROGRESS NOTES
HISTORY OF PRESENT ILLNESS  Sylvain Hughes is a 63 y.o. female. Patient presents for follow up of chronic pain due to lumbar postlaminectomy syndrome and polyarthralgias, related to an industrial accident in [facility]. She is also s/p right-sided mastectomy secondary to invasive ductal carcinoma. She is s/p lumbar fusion revision with Dr. Yoana Brody on 5/3/16. She reports that she underwent the second of two radiofrequency ablation procedures last month. However, this did not seem to offer any meaningful relief. She feels that her pain has worsened rather than improved, and she complains of spasms and pain with walking. [facility] does not endorse radicular pain. We discussed her recent imaging, including CTs, x-rays, and MRI. They have all shown satisfactory healing of her recent fusion, with no acute stenosis at any level. I suspect that much of her residual pain is central rather than anatomical. She does not wish to consider SCS, noting that she underwent trialing about seven years ago with no relief. She is very reticent to discuss coming down on her doses of fentanyl, which are still quite high at 75 mcg/hr. We discussed that she continues to report her pain as poorly controlled, so clearly these medications are doing little in the way of pain relief. She is very nervous about withdrawal symptoms, stating that even small dose reductions often result in florid withdrawal symptoms. We will decrease very slowly, reducing to 62.5 mcg/hr for a month, then 50 mcg/hr for a month, then 37.5 mcg/hr for a month, then 25 mcg/hr, then 12 mcg/hr for a month, then 6 mcg/hr for a month, then stop. If she is unable to come completely off of fentanyl due to pain, we will stop at the lowest effective dose. We discussed also that we will no longer be able to prescribe benzos for sleep, spasms, or whatever health condition for which she needs it. This is due to risks of combining this with opioids.  We discussed that it is ideal not to medicate someone to sleep, but to improve intrinsic sleep hygiene. We will wean her off of Valium, but she states that this is not needed, as she only rarely needs it. We will transition to Trazodone 50 mg. Dosing and side effects discussed. She declines a sleep specialist consult, noting that sleep study in the past was normal.   A total of 40 minutes was spent with the patient of which more than 50% of the time was spent counseling the patient. HPI--see above    ROS  Constitutional: Positive for fever, chills and malaise/fatigue. HENT: Positive for neck pain. Musculoskeletal: Positive for myalgias, back pain and joint pain. Neurological: Positive for headaches. Psychiatric/Behavioral: Negative for depression. The patient is not nervous/anxious and does not have insomnia. Physical Exam  Constitutional: She is oriented to person, place, and time. She appears well-developed and well-nourished. in better spirits  Musculoskeletal:   Marked spasms of cervical paraspinous and trapezius musculature noted        Right shoulder: She exhibits tenderness. Left shoulder: She exhibits tenderness. Lumbar back: She exhibits decreased range of motion, tenderness, pain and spasm. Back:           Arms:  Brisk sacroiliac tenderness noted  Marked spasms of lumbar paraspinous musculature noted  Wearing back brace       Right hand: She exhibits tenderness. Left hand: She exhibits tenderness. Neurological: She is alert and oriented to person, place, and time. No cranial nerve deficit. She exhibits normal muscle tone. Coordination normal.   Psychiatric: She has a normal mood and affect. Her behavior is normal. Judgment and thought content normal.   ASSESSMENT and PLAN    ICD-10-CM ICD-9-CM    1. Lumbar post-laminectomy syndrome M96.1 722.83    2. Chemotherapy-induced neuropathy (HCC) G62.0 357.6     T45.1X5A E933.1    3. Lumbar neuritis M54.16 724.4    4.  Chronic right-sided low back pain without sciatica M54.5 724.2     G89.29 338.29    5. Postlaminectomy syndrome, lumbar region M96.1 722.83    6. Degeneration of lumbar intervertebral disc M51.37 722.52    7. Pain in joint, multiple sites M25.50 719.49    8. Generalized osteoarthritis M15.9 715.00    9. Bilateral sacroiliitis (HCC) M46.1 720.2    10. Lumbar facet arthropathy (HCC) M12.88 721.3    11. Encounter for long-term (current) use of high-risk medication Z79.899 V58.69    12. Foraminal stenosis of lumbar region M99.83 724.02       Plan:  Continue same medications as prescribed for chronic pain  Reduce Fentanyl to 62.5 mcg/hr every two days for one month  We will add Arthrotec (diclofenac/ misoprostol) twice daily as needed for arthritis and back pain. Take with food  For sleep, we will add Trazodone 50 mg.  Take 1-2 tablets nightly as needed  Follow up in 1 months or sooner if needed  Regular exercise and attention to emotional health and diet remain the most effective ways to treat chronic pain of all kinds  You may contact me with questions or concerns through [address]

## 2017-03-27 ENCOUNTER — OFFICE VISIT (OUTPATIENT)
Dept: PAIN MANAGEMENT | Age: 62
End: 2017-03-27

## 2017-03-27 VITALS — HEIGHT: 62 IN | BODY MASS INDEX: 32.02 KG/M2 | WEIGHT: 174 LBS

## 2017-03-27 DIAGNOSIS — M25.50 PAIN IN JOINT, MULTIPLE SITES: ICD-10-CM

## 2017-03-27 DIAGNOSIS — M25.50 POLYARTHRALGIA: ICD-10-CM

## 2017-03-27 DIAGNOSIS — T50.905A DRUG-INDUCED NAUSEA AND VOMITING: ICD-10-CM

## 2017-03-27 DIAGNOSIS — M51.37 DEGENERATION OF LUMBAR OR LUMBOSACRAL INTERVERTEBRAL DISC: ICD-10-CM

## 2017-03-27 DIAGNOSIS — M48.061 FORAMINAL STENOSIS OF LUMBAR REGION: ICD-10-CM

## 2017-03-27 DIAGNOSIS — M46.1 BILATERAL SACROILIITIS (HCC): ICD-10-CM

## 2017-03-27 DIAGNOSIS — F41.9 ANXIETY: ICD-10-CM

## 2017-03-27 DIAGNOSIS — G89.29 CHRONIC RIGHT-SIDED LOW BACK PAIN WITHOUT SCIATICA: ICD-10-CM

## 2017-03-27 DIAGNOSIS — T45.1X5A CHEMOTHERAPY-INDUCED NEUROPATHY (HCC): ICD-10-CM

## 2017-03-27 DIAGNOSIS — M79.7 FIBROMYALGIA: Primary | ICD-10-CM

## 2017-03-27 DIAGNOSIS — R11.2 DRUG-INDUCED NAUSEA AND VOMITING: ICD-10-CM

## 2017-03-27 DIAGNOSIS — M54.16 LUMBAR NEURITIS: ICD-10-CM

## 2017-03-27 DIAGNOSIS — K59.03 DRUG-INDUCED CONSTIPATION: ICD-10-CM

## 2017-03-27 DIAGNOSIS — G62.0 CHEMOTHERAPY-INDUCED NEUROPATHY (HCC): ICD-10-CM

## 2017-03-27 DIAGNOSIS — K58.2 IRRITABLE BOWEL SYNDROME WITH BOTH CONSTIPATION AND DIARRHEA: ICD-10-CM

## 2017-03-27 DIAGNOSIS — F51.04 CHRONIC INSOMNIA: ICD-10-CM

## 2017-03-27 DIAGNOSIS — M15.9 GENERALIZED OSTEOARTHRITIS: ICD-10-CM

## 2017-03-27 DIAGNOSIS — M96.1 POSTLAMINECTOMY SYNDROME, LUMBAR REGION: ICD-10-CM

## 2017-03-27 DIAGNOSIS — Z79.899 ENCOUNTER FOR LONG-TERM (CURRENT) USE OF HIGH-RISK MEDICATION: ICD-10-CM

## 2017-03-27 DIAGNOSIS — F32.A DEPRESSION, UNSPECIFIED DEPRESSION TYPE: ICD-10-CM

## 2017-03-27 DIAGNOSIS — E55.9 VITAMIN D DEFICIENCY: ICD-10-CM

## 2017-03-27 DIAGNOSIS — M96.1 LUMBAR POST-LAMINECTOMY SYNDROME: ICD-10-CM

## 2017-03-27 DIAGNOSIS — M79.7 FIBROMYALGIA: ICD-10-CM

## 2017-03-27 DIAGNOSIS — M54.50 CHRONIC RIGHT-SIDED LOW BACK PAIN WITHOUT SCIATICA: ICD-10-CM

## 2017-03-27 DIAGNOSIS — M25.519 ARTHRALGIA OF SHOULDER, UNSPECIFIED LATERALITY: ICD-10-CM

## 2017-03-27 DIAGNOSIS — G47.10 HYPERSOMNOLENCE: ICD-10-CM

## 2017-03-27 DIAGNOSIS — M47.816 LUMBAR FACET ARTHROPATHY: ICD-10-CM

## 2017-03-27 RX ORDER — FENTANYL 50 UG/1
1 PATCH TRANSDERMAL
Qty: 15 PATCH | Refills: 0 | Status: SHIPPED | OUTPATIENT
Start: 2017-03-27 | End: 2017-03-27 | Stop reason: SDUPTHER

## 2017-03-27 RX ORDER — LIDOCAINE 50 MG/G
1-2 OINTMENT TOPICAL AS NEEDED
Qty: 120 G | Refills: 5 | Status: SHIPPED | OUTPATIENT
Start: 2017-03-27 | End: 2018-10-19

## 2017-03-27 RX ORDER — DICLOFENAC SODIUM 30 MG/G
2-4 GEL TOPICAL 2 TIMES DAILY
Qty: 100 G | Refills: 5 | Status: SHIPPED | OUTPATIENT
Start: 2017-03-27 | End: 2017-12-06 | Stop reason: SDUPTHER

## 2017-03-27 RX ORDER — TRAMADOL HYDROCHLORIDE 50 MG/1
100 TABLET ORAL
Qty: 180 TAB | Refills: 3 | Status: SHIPPED | OUTPATIENT
Start: 2017-03-27 | End: 2017-07-10 | Stop reason: SDUPTHER

## 2017-03-27 RX ORDER — FENTANYL 50 UG/1
1 PATCH TRANSDERMAL
Qty: 15 PATCH | Refills: 0 | Status: SHIPPED | OUTPATIENT
Start: 2017-04-25 | End: 2017-05-12 | Stop reason: SDUPTHER

## 2017-03-27 NOTE — PATIENT INSTRUCTIONS
Plan:  Discontinue Gabapentin  Reduce fentanyl to 50 mcg/hr patches every two days  D/C Percocet  Change to Tramadol: 1-2 tablets three times daily as needed for breakthrough pain  Try reducing Robaxin to 1/2 tablet three times daily if needed for spasms  Labs evaluating complete blood count, liver function, and autoimmune disorders  Aquatic therapy to re-integrate daily exercise safely back into your regimen. Remember: EXERCISE IS MEDICINE!   Follow up in 6 weeks or sooner if needed  Regular exercise and attention to emotional health and diet remain the most effective ways to treat chronic pain of all kinds  You may contact me with questions or concerns through [address]

## 2017-03-27 NOTE — PROGRESS NOTES
Nursing Notes    Patient presents to the office today in follow-up. Patient rates her pain at 6/10 on the numerical pain scale. Reviewed medications with counts as follows:    Rx Date filled Qty Dispensed Pill Count Last Dose Short   Fentanyl 62.g mcg/hr  02/22/17 15 0, +1 on Current patch on right hip no   Percocet 10/325 mg  03/20/17 90 82 today no                             POC UDS was not performed in office today    Any new labs or imaging since last appointment? NO    Have you been to an emergency room (ER) or urgent care clinic since your last visit? NO            Have you been hospitalized since your last visit? NO     If yes, where, when, and reason for visit? Have you seen or consulted any other health care providers outside of the [address] [place] [address]  since your last visit? NO     If yes, where, when, and reason for visit? HM deferred to pcp.

## 2017-03-27 NOTE — PROGRESS NOTES
HISTORY OF PRESENT ILLNESS  Lilia Sylvester is a 63 y.o. female. Patient presents for follow up of chronic pain due to lumbar postlaminectomy syndrome and polyarthralgias, related to an industrial accident in [address] [facility]. She is also s/p right-sided mastectomy secondary to invasive ductal carcinoma. She is s/p lumbar fusion revision with Dr. Doug Alford on 5/3/16. She again has a host of somatic complaints, including dizziness, generalized weakness, \"bowel dysfunction\", constipation, bloating, appetite, fatigue, poor sleep, insomnia coupled with periods of hypersomnolence, headaches, worsening upper back pain, pelvic pain, poor memory, loss of focus, depression, anxiety, and neck pain. These have progressively worsened in spite of multiple interventions, including epidurals, SIJ injections, radiofrequency ablation, and even surgery. She is tearful as she discusses her symptoms and her perceived lack of control over her pain. We discussed her concerns at length, as well as our concern that her widespread pain  We discussed that her symptoms are consistent with fibromyalgia. Multiple imaging has revealed no concrete etiology for her multitude of symptoms. We discussed the pathophysiology of central pain syndromes, including their effect on mental well-being and gut health. She recalls that she had a screening colonoscopy in 2013 with Dr. Prashanth Mcdonald, and this was normal. She has discontinued Arthortec. She still takes Senna Plus daily but this has been less effective of late. We discussed that her myriad symptoms need to be addressed by a GI specialist. She denies hematochezia, melena, or hematemesis. Due to concern that gabapentin was contributing to memory \"fog\", she weaned and discontinued, taking her last dose less than 48 hours ago. It is unclear if this has had any effect on mentation or cognitive dysfunction. We discussed treatment options at length--see treatment plan below.    She has reduced her dose of fentanyl to 62.5 mcg/hr and has tolerated this well. We will further decrease at this time to 50 mcg/hr every two days. Per her request, we will also change from percocet to tramadol for breakthrough pain. Labs will be ordered to assess for possible autoimmune arthropathy, and follow up will be arranged with Dr. Mirza Holly in about six weeks. She denies any falls, injuries, or hospitalizations since the last visit. A total of 50 minutes was spent with the patient of which more than 50% of the time was spent counseling the patient. HPI--see above    ROS  Constitutional: Positive for fever, chills and malaise/fatigue. HENT: Positive for neck pain. Musculoskeletal: Positive for myalgias, back pain and joint pain. Neurological: Positive for headaches. Psychiatric/Behavioral: Negative for depression. The patient is not nervous/anxious and does not have insomnia. Physical Exam  Constitutional: She is oriented to person, place, and time. She appears well-developed and well-nourished. in better spirits  Musculoskeletal:   Marked spasms of cervical paraspinous and trapezius musculature noted        Right shoulder: She exhibits tenderness. Left shoulder: She exhibits tenderness. Lumbar back: She exhibits decreased range of motion, tenderness, pain and spasm. Back:           Arms:  Brisk sacroiliac tenderness noted  Marked spasms of lumbar paraspinous musculature noted  Wearing back brace       Right hand: She exhibits tenderness. Left hand: She exhibits tenderness. Neurological: She is alert and oriented to person, place, and time. No cranial nerve deficit. She exhibits normal muscle tone. Coordination normal.   Psychiatric: She has a normal mood and affect. Her behavior is normal. Judgment and thought content normal.   ASSESSMENT and PLAN    ICD-10-CM ICD-9-CM    1.  Fibromyalgia M79.7 729.1 REFERRAL TO PHYSICAL THERAPY      SED RATE (ESR)      TSH 3RD GENERATION      PTH INTACT      METABOLIC PANEL, COMPREHENSIVE      CBC WITH AUTOMATED DIFF      AMMON QL, W/REFLEX CASCADE      VITAMIN D, 1, 25 DIHYDROXY      RHEUMATOID FACTOR, QL      COMPLEMENT, C3 & C4      PREGNENOLONE      HEPATITIS C AB   2. Chemotherapy-induced neuropathy (HCC) G62.0 357.6     T45.1X5A E933.1    3. Lumbar neuritis M54.16 724.4    4. Chronic right-sided low back pain without sciatica M54.5 724.2     G89.29 338.29    5. Postlaminectomy syndrome, lumbar region M96.1 722.83    6. Degeneration of lumbar intervertebral disc M51.37 722.52    7. Pain in joint, multiple sites M25.50 719.49    8. Arthralgia of shoulder, unspecified laterality M25.519 719.41 REFERRAL TO PHYSICAL THERAPY   9. Generalized osteoarthritis M15.9 715.00    10. Lumbar facet arthropathy (HCC) M12.88 721.3 REFERRAL TO PHYSICAL THERAPY   11. Lumbar post-laminectomy syndrome M96.1 722.83 REFERRAL TO PHYSICAL THERAPY   12. Encounter for long-term (current) use of high-risk medication Z79.899 V58.69    13. Foraminal stenosis of lumbar region M99.83 724.02    14. Bilateral sacroiliitis (HCC) M46.1 720.2    15. Drug-induced constipation K59.03 564.09 SED RATE (ESR)     E980.5 TSH 3RD GENERATION      PTH INTACT      METABOLIC PANEL, COMPREHENSIVE      CBC WITH AUTOMATED DIFF      AMMON QL, W/REFLEX CASCADE      VITAMIN D, 1, 25 DIHYDROXY      RHEUMATOID FACTOR, QL      COMPLEMENT, C3 & C4      PREGNENOLONE      HEPATITIS C AB   16. Drug-induced nausea and vomiting T50.901A 787.01     R11.2 E980.5    17. Irritable bowel syndrome with both constipation and diarrhea K58.2 564.1 SED RATE (ESR)      TSH 3RD GENERATION      PTH INTACT      METABOLIC PANEL, COMPREHENSIVE      CBC WITH AUTOMATED DIFF      AMMON QL, W/REFLEX CASCADE      VITAMIN D, 1, 25 DIHYDROXY      RHEUMATOID FACTOR, QL      COMPLEMENT, C3 & C4      PREGNENOLONE      HEPATITIS C AB   18.  Anxiety F41.9 300.00 SED RATE (ESR)      TSH 3RD GENERATION      PTH INTACT      METABOLIC PANEL, COMPREHENSIVE      CBC WITH AUTOMATED DIFF      AMMON QL, W/REFLEX CASCADE      VITAMIN D, 1, 25 DIHYDROXY      RHEUMATOID FACTOR, QL      COMPLEMENT, C3 & C4      PREGNENOLONE      HEPATITIS C AB   19. Depression, unspecified depression type F32.9 311 SED RATE (ESR)      TSH 3RD GENERATION      PTH INTACT      METABOLIC PANEL, COMPREHENSIVE      CBC WITH AUTOMATED DIFF      AMMON QL, W/REFLEX CASCADE      VITAMIN D, 1, 25 DIHYDROXY      RHEUMATOID FACTOR, QL      COMPLEMENT, C3 & C4      PREGNENOLONE      HEPATITIS C AB   20. Chronic insomnia F51.04 780.52 SED RATE (ESR)      TSH 3RD GENERATION      PTH INTACT      METABOLIC PANEL, COMPREHENSIVE      CBC WITH AUTOMATED DIFF      AMMON QL, W/REFLEX CASCADE      VITAMIN D, 1, 25 DIHYDROXY      RHEUMATOID FACTOR, QL      COMPLEMENT, C3 & C4      PREGNENOLONE      HEPATITIS C AB   21. Hypersomnolence G47.10 780.54 SED RATE (ESR)      TSH 3RD GENERATION      PTH INTACT      METABOLIC PANEL, COMPREHENSIVE      CBC WITH AUTOMATED DIFF      AMMON QL, W/REFLEX CASCADE      VITAMIN D, 1, 25 DIHYDROXY      RHEUMATOID FACTOR, QL      COMPLEMENT, C3 & C4      PREGNENOLONE      HEPATITIS C AB   22. Polyarthralgia M25.50 719.49 SED RATE (ESR)      TSH 3RD GENERATION      PTH INTACT      METABOLIC PANEL, COMPREHENSIVE      CBC WITH AUTOMATED DIFF      AMMON QL, W/REFLEX CASCADE      VITAMIN D, 1, 25 DIHYDROXY      RHEUMATOID FACTOR, QL      COMPLEMENT, C3 & C4      PREGNENOLONE      HEPATITIS C AB   23. Vitamin D deficiency E55.9 268.9 VITAMIN D, 1, 25 DIHYDROXY      Plan:  Discontinue Gabapentin  Reduce fentanyl to 50 mcg/hr patches every two days  D/C Percocet  Change to Tramadol: 1-2 tablets three times daily as needed for breakthrough pain  Try reducing Robaxin to 1/2 tablet three times daily if needed for spasms  Labs evaluating complete blood count, liver function, and autoimmune disorders  Aquatic therapy to re-integrate daily exercise safely back into your regimen. Remember: EXERCISE IS MEDICINE!   Follow up in 6 weeks or sooner if needed  Regular exercise and attention to emotional health and diet remain the most effective ways to treat chronic pain of all kinds  You may contact me with questions or concerns through [address]

## 2017-03-27 NOTE — MR AVS SNAPSHOT
Visit Information Date & Time Provider Department Dept. Phone Encounter #  
 3/27/2017  1:00 PM Angel Metz [address] [place] [address] for Pain Management [id number] Follow-up Instructions Return in about 6 weeks (around 5/8/2017). Follow-up and Disposition History Your Appointments 5/12/2017  1:20 PM  
Follow Up with Alexi Washington MD  
[address] [place] [address] for Pain Management [facility]) Appt Note: 12  weeks [facility] [address]  
[phone] Fauzia Lopez [address] [id number] 5/12/2017  2:00 PM  
Follow Up with Guzman Louise PA-C [address] [place] [address] for Pain Management ([facility] SCHEDULING) Appt Note: pt ; pt  [facility] [address]  
[phone] [id number]  
  
    
 6/8/2017  1:20 PM  
[facility] [address] [facility] ([facility]) Appt Note: return in 4 weeks [facility] [address]  
[phone] Upcoming Health Maintenance Date Due Hepatitis C Screening 1955 Pneumococcal 19-64 Highest Risk (1 of 3 - PCV13) 12/23/1976 BREAST CANCER SCRN MAMMOGRAM 10/29/2015 INFLUENZA AGE 9 TO ADULT 8/1/2016 PAP AKA CERVICAL CYTOLOGY 12/13/2019 COLONOSCOPY 3/15/2023 DTaP/Tdap/Td series (2 - Td) 4/1/2025 Allergies as of 3/27/2017  Review Complete On: 3/27/2017 By: Marlin Bradley LPN Severity Noted Reaction Type Reactions Adhesive    Rash Aspirin  06/20/2011    Other (comments), Nausea and Vomiting G6PD 
GI BLEED AND ULCER Contrast Agent [Iodine]    Rash Allergic to CT Dye  
 Dilantin [Phenytoin Sodium Extended]    Other (comments) Difficulty waking Iodinated Contrast Media - Oral And Iv Dye  05/03/2016    Rash \"bumps on face\" Lipitor [Atorvastatin]  12/13/2016    Other (comments) PKU Pcn [Penicillins]    Rash, Hives \"sores all over the body\" Phenytoin  05/03/2016    Unknown (comments) \"Trouble waking up\" Sulfa (Sulfonamide Antibiotics)    Rash, Nausea and Vomiting G6PD 
G6PD Tegretol [Carbamazepine]    Other (comments), Vertigo \"Trouble waking up\" Difficulty waking up Current Immunizations  Reviewed on 11/25/2015 Name Date Influenza Vaccine 11/25/2015  7:48 AM  
 Tdap 4/1/2015 Not reviewed this visit You Were Diagnosed With   
  
 Codes Comments Fibromyalgia    -  Primary ICD-10-CM: M79.7 ICD-9-CM: 729.1 Chemotherapy-induced neuropathy (HCC)     ICD-10-CM: G62.0, T45.1X5A 
ICD-9-CM: 357.6, E933.1 Lumbar neuritis     ICD-10-CM: M54.16 
ICD-9-CM: 724.4 Chronic right-sided low back pain without sciatica     ICD-10-CM: M54.5, G89.29 ICD-9-CM: 724.2, 338.29 Postlaminectomy syndrome, lumbar region     ICD-10-CM: M96.1 ICD-9-CM: 722.83 Degeneration of lumbar or lumbosacral intervertebral disc     ICD-10-CM: M51.37 
ICD-9-CM: 722.52 Pain in joint, multiple sites     ICD-10-CM: M25.50 ICD-9-CM: 719.49 Arthralgia of shoulder, unspecified laterality     ICD-10-CM: M25.519 ICD-9-CM: 719.41 Generalized osteoarthritis     ICD-10-CM: M15.9 ICD-9-CM: 715.00 Lumbar facet arthropathy (HCC)     ICD-10-CM: M12.88 ICD-9-CM: 721.3 Lumbar post-laminectomy syndrome     ICD-10-CM: M96.1 ICD-9-CM: 722.83 Encounter for long-term (current) use of high-risk medication     ICD-10-CM: Z79.899 ICD-9-CM: V58.69 Foraminal stenosis of lumbar region     ICD-10-CM: M99.83 ICD-9-CM: 724.02 Bilateral sacroiliitis (HCC)     ICD-10-CM: M46.1 ICD-9-CM: 720.2 Drug-induced constipation     ICD-10-CM: K59.03 
ICD-9-CM: 564.09, E980.5 Drug-induced nausea and vomiting     ICD-10-CM: T50.901A, R11.2 ICD-9-CM: 787.01, E980.5 Irritable bowel syndrome with both constipation and diarrhea     ICD-10-CM: K58.2 ICD-9-CM: 031.8 Anxiety     ICD-10-CM: F41.9 ICD-9-CM: 300.00 Depression, unspecified depression type     ICD-10-CM: F32.9 ICD-9-CM: 260 Chronic insomnia     ICD-10-CM: F51.04 
ICD-9-CM: 780.52 Hypersomnolence     ICD-10-CM: G47.10 ICD-9-CM: 780.54 Polyarthralgia     ICD-10-CM: M25.50 ICD-9-CM: 719.49 Vitamin D deficiency     ICD-10-CM: E55.9 ICD-9-CM: 268.9 Vitals Height(growth percentile) Weight(growth percentile) LMP BMI OB Status Smoking Status 5' 2\" (1.575 m) 174 lb (78.9 kg) 08/28/2002 31.83 kg/m2 Postmenopausal Never Smoker BMI and BSA Data Body Mass Index Body Surface Area  
 31.83 kg/m 2 1.86 m 2 Preferred Pharmacy Pharmacy Name Phone [facility] [address] [phone] Your Updated Medication List  
  
   
This list is accurate as of: 3/27/17  2:40 PM.  Always use your most recent med list.  
  
  
  
  
 acetaminophen 500 mg tablet Commonly known as:  TYLENOL Take  by mouth every six (6) hours as needed for Pain. CLARITIN 10 mg tablet Generic drug:  loratadine Take 10 mg by mouth daily as needed. diclofenac 3 % topical gel Commonly known as:  Sarah Bras Apply 2-4 g to affected area two (2) times a day. As needed for joint pain. Apply to painful areas  
  
 doxycycline 100 mg capsule Commonly known as:  VIBRAMYCIN Take 1 capsule twice daily with food for 10 days, remain upright for 45 minutes after each dose. ergocalciferol 50,000 unit capsule Commonly known as:  ERGOCALCIFEROL Take 1 Cap by mouth every seven (7) days. fentaNYL 50 mcg/hr PATCH Commonly known as:  DURAGESIC  
1 Patch by TransDERmal route every fourty-eight (48) hours. Max Daily Amount: 1 Patch. for chronic, severe, refractory pain. Mylan, Sandoz, or Mallinckrodt brands only Start taking on:  4/25/2017 fluticasone 50 mcg/actuation nasal spray Commonly known as:  Lindalee [place] Two sprays in each nostril daily. folic acid 1 mg tablet Commonly known as:  Google Take 1 mg by mouth two (2) times a day. levothyroxine 100 mcg tablet Commonly known as:  SYNTHROID  
TAKE 1 TABLET BY MOUTH DAILY (BEFORE BREAKFAST) lidocaine 5 % ointment Commonly known as:  XYLOCAINE Apply 1-2 g to affected area as needed for Pain. To painful areas  
  
 methocarbamol 750 mg tablet Commonly known as:  ROBAXIN Take 1 Tab by mouth three (3) times daily. As needed for muscle spasms NASONEX 50 mcg/actuation nasal spray Generic drug:  mometasone 2 Sprays daily as needed. ondansetron hcl 8 mg tablet Commonly known as:  Conner Mater Take 8 mg by mouth. OTHER Apply  to affected area three (3) times daily as needed. VA-CMOD1 compounded cream from [address] [place], [place] [address]docusate 8.6-50 mg per tablet Commonly known as:  Melba Brunilda Take 2 Tabs by mouth daily. For chronic constipation  
  
 traMADol 50 mg tablet Commonly known as:  ULTRAM  
Take 2 Tabs by mouth three (3) times daily as needed for Pain. Max Daily Amount: 300 mg. Transparent Dressings 3 1/2 X 4 \" Bndg Commonly known as:  TEGADERM  
1 Patch by Apply Externally route every fourty-eight (48) hours. traZODone 50 mg tablet Commonly known as:  [place] Take 2 Tabs by mouth nightly. As needed for insomnia ZANTAC 150 mg tablet Generic drug:  raNITIdine Take 150 mg by mouth daily as needed. Prescriptions Printed Refills  
 traMADol (ULTRAM) 50 mg tablet 3 Sig: Take 2 Tabs by mouth three (3) times daily as needed for Pain. Max Daily Amount: 300 mg. Class: Print Route: Oral  
 fentaNYL (DURAGESIC) 50 mcg/hr PATCH 0 Starting on: 2017 Si Patch by TransDERmal route every fourty-eight (48) hours.  Max Daily Amount: 1 Patch. for chronic, severe, refractory pain. Mylan, Sandoz, or Mallinckrodt brands only Class: Print Route: TransDERmal  
  
Prescriptions Sent to Pharmacy Refills  
 diclofenac (SOLARAZE) 3 % topical gel 5 Sig: Apply 2-4 g to affected area two (2) times a day. As needed for joint pain. Apply to painful areas Class: Normal  
 Pharmacy: [facility]/pharmacy #[id number] - [address] Ph #: [phone] Route: Topical  
 lidocaine (XYLOCAINE) 5 % ointment 5 Sig: Apply 1-2 g to affected area as needed for Pain. To painful areas Class: Normal  
 Pharmacy: [facility]/pharmacy #[id number] - [address] Ph #: [phone] Route: Topical  
  
We Performed the Following REFERRAL TO PHYSICAL THERAPY [DEL33 Custom] Comments:  
 Please evaluate and treat Ms. Moisés Orellana for chronic multifocal pain, daytime fatigue, muscle soreness, and chronic back pain due to fibromyalgia and lumbar postlaminectomy syndrome. Please start with aquatic therapy and transition as tolerated to land-based therapy. Thank you in advance for seeing this pleasant patient. Follow-up Instructions Return in about 6 weeks (around 5/8/2017). To-Do List   
 03/27/2017 Lab:  AMMON QL, W/REFLEX CASCADE   
  
 03/27/2017 Lab:  CBC WITH AUTOMATED DIFF   
  
 03/27/2017 Lab:  COMPLEMENT, C3 & C4   
  
 03/27/2017 Lab:  HEPATITIS C AB   
  
 03/27/2017 Lab:  METABOLIC PANEL, COMPREHENSIVE   
  
 03/27/2017 Lab:  PREGNENOLONE   
  
 03/27/2017 Lab:  PTH INTACT   
  
 03/27/2017 Lab:  RHEUMATOID FACTOR, QL   
  
 03/27/2017 Lab:  SED RATE (ESR)   
  
 03/27/2017 Lab:  TSH 3RD GENERATION   
  
 03/27/2017 Lab:  VITAMIN D, 1, 25 DIHYDROXY Referral Information Referral ID Referred By Referred To  
  
 [id number] RICK, [address] [facility], [address] [place] [address] Phone: [phone] Visits Status Start Date End Date 24 New Request 3/27/17 3/27/18 If your referral has a status of pending review or denied, additional information will be sent to support the outcome of this decision. Patient Instructions Plan: 
Discontinue Gabapentin Reduce fentanyl to 50 mcg/hr patches every two days D/C Percocet Change to Tramadol: 1-2 tablets three times daily as needed for breakthrough pain Try reducing Robaxin to 1/2 tablet three times daily if needed for spasms Labs evaluating complete blood count, liver function, and autoimmune disorders Aquatic therapy to re-integrate daily exercise safely back into your regimen. Remember: EXERCISE IS MEDICINE! Follow up in 6 weeks or sooner if needed Regular exercise and attention to emotional health and diet remain the most effective ways to treat chronic pain of all kinds You may contact me with questions or concerns through [address] Introducing [facility]! [facility] introduces JoMaJa patient portal. Now you can access parts of your medical record, email your doctor's office, and request medication refills online. 1. In your internet browser, go to [email or web address] 2. Click on the First Time User? Click Here link in the Sign In box. You will see the New Member Sign Up page. 3. Enter your JoMaJa Access Code exactly as it appears below. You will not need to use this code after youve completed the sign-up process. If you do not sign up before the expiration date, you must request a new code. · Jo[employer]Ja Access Code: [email or web address] Expires: 4/23/2017  3:13 PM 
 
4. Enter the last four digits of your Social Security Number (xxxx) and Date of Birth (mm/dd/yyyy) as indicated and click Submit. You will be taken to the next sign-up page. 5. Create a JoMaJa ID. This will be your Jo[employer]Ja login ID and cannot be changed, so think of one that is secure and easy to remember. 6. Create a [employer]abe password. You can change your password at any time. 7. Enter your Password Reset Question and Answer. This can be used at a later time if you forget your password. 8. Enter your e-mail address. You will receive e-mail notification when new information is available in [address] 9. Click Sign Up. You can now view and download portions of your medical record. 10. Click the Download Summary menu link to download a portable copy of your medical information. If you have questions, please visit the Frequently Asked Questions section of the [employer] website. Remember, YaSabe is NOT to be used for urgent needs. For medical emergencies, dial 911. Now available from your iPhone and Android! Please provide this summary of care documentation to your next provider. Your primary care clinician is listed as Mahesh 51. If you have any questions after today's visit, please call [phone].

## 2017-03-28 ENCOUNTER — TELEPHONE (OUTPATIENT)
Dept: FAMILY MEDICINE CLINIC | Age: 62
End: 2017-03-28

## 2017-03-29 ENCOUNTER — TELEPHONE (OUTPATIENT)
Dept: PAIN MANAGEMENT | Age: 62
End: 2017-03-29

## 2017-03-29 NOTE — TELEPHONE ENCOUNTER
I can see in her chart that her TSH was already ordered by Fredi Lozano at pain management as part of the labs that she has ordered for Brittny White. So, she will not need an order from me.

## 2017-03-29 NOTE — TELEPHONE ENCOUNTER
Received request for PT referral from [facility]. Have faxed as requested to their facility at [phone] with ref # [id number].

## 2017-03-31 ENCOUNTER — TELEPHONE (OUTPATIENT)
Dept: PAIN MANAGEMENT | Age: 62
End: 2017-03-31

## 2017-03-31 RX ORDER — LUBIPROSTONE 24 UG/1
24 CAPSULE, GELATIN COATED ORAL
Qty: 30 CAP | Refills: 5 | Status: SHIPPED | OUTPATIENT
Start: 2017-03-31 | End: 2017-05-12 | Stop reason: SDUPTHER

## 2017-04-18 ENCOUNTER — OFFICE VISIT (OUTPATIENT)
Dept: FAMILY MEDICINE CLINIC | Age: 62
End: 2017-04-18

## 2017-04-18 VITALS
SYSTOLIC BLOOD PRESSURE: 122 MMHG | HEIGHT: 62 IN | OXYGEN SATURATION: 99 % | BODY MASS INDEX: 30.55 KG/M2 | WEIGHT: 166 LBS | RESPIRATION RATE: 22 BRPM | TEMPERATURE: 97.1 F | HEART RATE: 87 BPM | DIASTOLIC BLOOD PRESSURE: 80 MMHG

## 2017-04-18 DIAGNOSIS — J10.1 INFLUENZA A: Primary | ICD-10-CM

## 2017-04-18 DIAGNOSIS — J02.9 SORE THROAT: ICD-10-CM

## 2017-04-18 LAB
FLUAV+FLUBV AG NOSE QL IA.RAPID: NEGATIVE POS/NEG
FLUAV+FLUBV AG NOSE QL IA.RAPID: POSITIVE POS/NEG
VALID INTERNAL CONTROL?: YES

## 2017-04-18 RX ORDER — OSELTAMIVIR PHOSPHATE 75 MG/1
75 CAPSULE ORAL 2 TIMES DAILY
Qty: 10 CAP | Refills: 0 | Status: SHIPPED | OUTPATIENT
Start: 2017-04-18 | End: 2017-04-23

## 2017-04-18 NOTE — PROGRESS NOTES
1. Have you been to the ER, urgent care clinic since your last visit? Hospitalized since your last visit? Yes [facility] on 4/10/17 for fever and sore throat    2. Have you seen or consulted any other health care providers outside of the [address] [place] [address] since your last visit? Include any pap smears or colon screening.  No

## 2017-04-18 NOTE — PROGRESS NOTES
Assessment/Plan:    *Diagnoses and all orders for this visit:    Influenza A  -     oseltamivir (TAMIFLU) 75 mg capsule; Take 1 Cap by mouth two (2) times a day for 5 days. Sore throat  -     AMB POC CHRISTEL INFLUENZA A/B TEST      The plan was discussed with the patient. The patient verbalized understanding and is in agreement with the plan. All medication potential side effects were discussed with the patient.    -------------------------------------------------------------------------------------------------------------------        Marlene Iglesias is a 63 y.o. female and presents with No chief complaint on file. Subjective:  Pot here for sinus pressure, headache, congestion, +post nasal drainage, + body aches, fevers, yesterday was 101.4. Was seen at [facility] 4/10, treated as bronchitis with Zithromax. Felt she got better initially but then symptoms returned thais feels worse now for 2 days. ROS:  Constitutional: No recent weight change. No weakness/fatigue. No f/c. Skin: No rashes, change in nails/hair, itching   HENT: No HA, dizziness. No hearing loss/tinnitus. No nasal congestion/discharge. Eyes: No change in vision, double/blurred vision or eye pain/redness. Cardiovascular: No CP/palpitations. No SIMON/orthopnea/PND. Respiratory: No cough/sputum, dyspnea, wheezing. Gastointestinal: No dysphagia, reflux. No n/v. No constipation/diarrhea. No melena/rectal bleeding. Genitourinary: No dysuria, urinary hesitancy, nocturia, hematuria. No incontinence. Musculoskeletal: No joint pain/stiffness. No muscle pain/tenderness. Endo: No heat/cold intolerance, no polyuria/polydypsia. Heme: No h/o anemia. No easy bleeding/bruising. Allergy/Immunology: No seasonal rhinitis. Denies frequent colds, sinus/ear infections. Neurological: No seizures/numbness/weakness. No paresthesias. Psychiatric:  No depression, anxiety.      The problem list was updated as a part of today's visit. Patient Active Problem List   Diagnosis Code    Chronic low back pain M54.5, G89.29    Postlaminectomy syndrome, lumbar region M96.1    Degeneration of lumbar intervertebral disc M51.37    Pain in joint, multiple sites M25.50    Pain in joint, shoulder region M25.519    Generalized osteoarthritis M15.9    History of breast cancer C50.919    Unspecified hypothyroidism E03.9    HLD (hyperlipidemia) E78.5    G6PD (glucose 6 phosphatase deficiency)     Shoulder pain, left M25.512    Recurrent UTI N39.0    Chemotherapy-induced neuropathy (HCC) G62.0, T45.1X5A    Chest wall pain following surgery G89.12    Renal insufficiency N28.9    Proteinuria R80.9    Chronic insomnia F51.04    Paroxysmal sleep G47.419    Encounter for long-term (current) use of high-risk medication Z79.899    Foraminal stenosis of lumbar region M99.83    Lumbar facet arthropathy (HCC) M12.88    Lumbar post-laminectomy syndrome M96.1    Lumbar neuritis M54.16    Spasm of back muscles M62.830    Drug-induced constipation K59.03    Bilateral sacroiliitis (HCC) M46.1    Drug-induced nausea and vomiting T50.901A, R11.2    Irritable bowel syndrome with both constipation and diarrhea K58.2    Fibromyalgia M79.7    Anxiety F41.9    Depression F32.9    Hypersomnolence G47.10       The PSH, FH were reviewed. SH:  Social History   Substance Use Topics    Smoking status: Never Smoker    Smokeless tobacco: Never Used    Alcohol use No       Medications/Allergies:  Current Outpatient Prescriptions on File Prior to Visit   Medication Sig Dispense Refill    lubiPROStone (AMITIZA) 24 mcg capsule Take 1 Cap by mouth daily (with breakfast) for 30 days. For chronic constipation 30 Cap 5    diclofenac (SOLARAZE) 3 % topical gel Apply 2-4 g to affected area two (2) times a day. As needed for joint pain. Apply to painful areas 100 g 5    lidocaine (XYLOCAINE) 5 % ointment Apply 1-2 g to affected area as needed for Pain. To painful areas 120 g 5    traMADol (ULTRAM) 50 mg tablet Take 2 Tabs by mouth three (3) times daily as needed for Pain. Max Daily Amount: 300 mg. 180 Tab 3    [START ON 4/25/2017] fentaNYL (DURAGESIC) 50 mcg/hr PATCH 1 Patch by TransDERmal route every fourty-eight (48) hours. Max Daily Amount: 1 Patch. for chronic, severe, refractory pain. Mylan, Sandoz, or Mallinckrodt brands only 15 Patch 0    traZODone (DESYREL) 50 mg tablet Take 2 Tabs by mouth nightly. As needed for insomnia 60 Tab 3    fluticasone (FLONASE) 50 mcg/actuation nasal spray Two sprays in each nostril daily. 1 Bottle 2    Transparent Dressings (TEGADERM) 3 1/2 X 4 \" bndg 1 Patch by Apply Externally route every fourty-eight (48) hours. 25 Each 5    doxycycline (VIBRAMYCIN) 100 mg capsule Take 1 capsule twice daily with food for 10 days, remain upright for 45 minutes after each dose. 20 Cap 0    levothyroxine (SYNTHROID) 100 mcg tablet TAKE 1 TABLET BY MOUTH DAILY (BEFORE BREAKFAST) 90 Tab 2    senna-docusate (PERICOLACE) 8.6-50 mg per tablet Take 2 Tabs by mouth daily. For chronic constipation 60 Tab 5    methocarbamol (ROBAXIN) 750 mg tablet Take 1 Tab by mouth three (3) times daily. As needed for muscle spasms 90 Tab 5    ergocalciferol (ERGOCALCIFEROL) 50,000 unit capsule Take 1 Cap by mouth every seven (7) days. 4 Cap 11    acetaminophen (TYLENOL) 500 mg tablet Take  by mouth every six (6) hours as needed for Pain.  ondansetron hcl (ZOFRAN) 8 mg tablet Take 8 mg by mouth.  folic acid (FOLVITE) 1 mg tablet Take 1 mg by mouth two (2) times a day.  mometasone (NASONEX) 50 mcg/actuation nasal spray 2 Sprays daily as needed.  OTHER Apply  to affected area three (3) times daily as needed. VA-CMOD1 compounded cream from [facility] ranitidine (ZANTAC) 150 mg tablet Take 150 mg by mouth daily as needed.  loratadine (CLARITIN) 10 mg tablet Take 10 mg by mouth daily as needed.        No current facility-administered medications on file prior to visit. Allergies   Allergen Reactions    Adhesive Rash    Aspirin Other (comments) and Nausea and Vomiting     G6PD  GI BLEED AND ULCER    Contrast Agent [Iodine] Rash     Allergic to CT Dye    Dilantin [Phenytoin Sodium Extended] Other (comments)     Difficulty waking    Iodinated Contrast Media - Oral And Iv Dye Rash     \"bumps on face\"    Lipitor [Atorvastatin] Other (comments)     PKU     Pcn [Penicillins] Rash and Hives     \"sores all over the body\"    Phenytoin Unknown (comments)     \"Trouble waking up\"    Sulfa (Sulfonamide Antibiotics) Rash and Nausea and Vomiting     G6PD  G6PD    Tegretol [Carbamazepine] Other (comments) and Vertigo     \"Trouble waking up\"  Difficulty waking up         Health Maintenance:   Health Maintenance   Topic Date Due    Hepatitis C Screening  1955    Pneumococcal 19-64 Highest Risk (1 of 3 - PCV13) 12/23/1976    BREAST CANCER SCRN MAMMOGRAM  10/29/2015    INFLUENZA AGE 9 TO ADULT  08/01/2016    PAP AKA CERVICAL CYTOLOGY  12/13/2019    COLONOSCOPY  03/15/2023    DTaP/Tdap/Td series (2 - Td) 04/01/2025       Objective:  Visit Vitals    /80    Pulse 87    Temp 97.1 °F (36.2 °C)    Resp 22    Ht 5' 2\" (1.575 m)    Wt 166 lb (75.3 kg)    LMP 08/28/2002    SpO2 99%    BMI 30.36 kg/m2          Nurses notes and VS reviewed.       Physical Examination: General appearance - ill-appearing  Ears - bilateral TM's and external ear canals normal  Nose - mucosal congestion and sinus tenderness noted maxillary  Mouth - erythematous  Neck - supple, no significant adenopathy  Chest - clear to auscultation, no wheezes, rales or rhonchi, symmetric air entry        Labwork and Ancillary Studies:    CBC w/Diff  Lab Results   Component Value Date/Time    WBC 5.8 10/06/2016 04:02 PM    HGB 12.2 10/06/2016 04:02 PM    PLATELET 151 35/44/6536 04:02 PM         Basic Metabolic Profile  Lab Results   Component Value Date/Time    Sodium 139 10/06/2016 04:02 PM    Potassium 4.4 10/06/2016 04:02 PM    Chloride 98 10/06/2016 04:02 PM    CO2 27 10/06/2016 04:02 PM    Anion gap 8 11/24/2015 10:47 AM    Glucose 94 10/06/2016 04:02 PM    BUN 9 10/06/2016 04:02 PM    Creatinine 1.02 10/06/2016 04:02 PM    BUN/Creatinine ratio 9 10/06/2016 04:02 PM    GFR est AA 70 10/06/2016 04:02 PM    GFR est non-AA 61 10/06/2016 04:02 PM    Calcium 9.4 10/06/2016 04:02 PM         LFT  Lab Results   Component Value Date/Time    ALT (SGPT) 20 11/24/2015 10:47 AM    AST (SGOT) 15 11/24/2015 10:47 AM    Alk.  phosphatase 78 11/24/2015 10:47 AM    Bilirubin, direct 0.1 11/24/2015 10:47 AM    Bilirubin, total 0.4 11/24/2015 10:47 AM         Cholesterol  Lab Results   Component Value Date/Time    Cholesterol, total 278 11/24/2015 10:47 AM    HDL Cholesterol 47 11/24/2015 10:47 AM    LDL, calculated 209.8 11/24/2015 10:47 AM    Triglyceride 106 11/24/2015 10:47 AM    CHOL/HDL Ratio 5.9 11/24/2015 10:47 AM

## 2017-04-18 NOTE — MR AVS SNAPSHOT
Visit Information Date & Time Provider Department Dept. Phone Encounter #  
 4/18/2017  8:45 AM Karyna Melvin, [address] [phone] [id number] Your Appointments 5/12/2017  1:20 PM  
Follow Up with Oumou Mendiola MD  
[facility] [address] [place] [address]) Appt Note: 12  weeks 30 [facility] [address]  
[phone] [address] [id number] 5/12/2017  2:00 PM  
Follow Up with Zoraida Weeks PA-C [facility] ([facility]) Appt Note: pt ; pt  30 [facility] [id number]  
[phone] [address]  
  
    
 6/8/2017  1:20 PM  
Follow Up with Zoraida Weeks PA-C [facility] ([facility]) Appt Note: return in 4 weeks 30 [facility] [address]  
[phone] Upcoming Health Maintenance Date Due Hepatitis C Screening 1955 Pneumococcal 19-64 Highest Risk (1 of 3 - PCV13) 12/23/1976 BREAST CANCER SCRN MAMMOGRAM 10/29/2015 INFLUENZA AGE 9 TO ADULT 8/1/2016 PAP AKA CERVICAL CYTOLOGY 12/13/2019 COLONOSCOPY 3/15/2023 DTaP/Tdap/Td series (2 - Td) 4/1/2025 Allergies as of 4/18/2017  Review Complete On: 4/18/2017 By: Karyna Melvin MD  
  
 Severity Noted Reaction Type Reactions Adhesive    Rash Aspirin  06/20/2011    Other (comments), Nausea and Vomiting G6PD 
GI BLEED AND ULCER Contrast Agent [Iodine]    Rash Allergic to CT Dye  
 Dilantin [Phenytoin Sodium Extended]    Other (comments) Difficulty waking Iodinated Contrast Media - Oral And Iv Dye  05/03/2016    Rash \"bumps on face\" Lipitor [Atorvastatin]  12/13/2016    Other (comments) PKU Pcn [Penicillins]    Rash, Hives \"sores all over the body\" Phenytoin  05/03/2016    Unknown (comments) \"Trouble waking up\" Sulfa (Sulfonamide Antibiotics)    Rash, Nausea and Vomiting G6PD 
G6PD Tegretol [Carbamazepine]    Other (comments), Vertigo \"Trouble waking up\" Difficulty waking up Current Immunizations  Reviewed on 11/25/2015 Name Date Influenza Vaccine 11/25/2015  7:48 AM  
 Tdap 4/1/2015 Not reviewed this visit You Were Diagnosed With   
  
 Codes Comments Influenza A    -  Primary ICD-10-CM: J10.1 ICD-9-CM: 153.7 Sore throat     ICD-10-CM: J02.9 ICD-9-CM: 734 Vitals BP Pulse Temp Resp Height(growth percentile) Weight(growth percentile) 122/80 87 97.1 °F (36.2 °C) 22 5' 2\" (1.575 m) 166 lb (75.3 kg) LMP SpO2 BMI OB Status Smoking Status 08/28/2002 99% 30.36 kg/m2 Postmenopausal Never Smoker Vitals History BMI and BSA Data Body Mass Index Body Surface Area  
 30.36 kg/m 2 1.81 m 2 Preferred Pharm[facility] [phone] Your Updated Medication List  
  
   
This list is accurate as of: 4/18/17 10:02 AM.  Always use your most recent med list.  
  
  
  
  
 acetaminophen 500 mg tablet Commonly known as:  TYLENOL Take  by mouth every six (6) hours as needed for Pain. CLARITIN 10 mg tablet Generic drug:  loratadine Take 10 mg by mouth daily as needed. diclofenac 3 % topical gel Commonly known as:  Malinda Rucker Apply 2-4 g to affected area two (2) times a day. As needed for joint pain. Apply to painful areas  
  
 doxycycline 100 mg capsule Commonly known as:  VIBRAMYCIN Take 1 capsule twice daily with food for 10 days, remain upright for 45 minutes after each dose. ergocalciferol 50,000 unit capsule Commonly known as:  ERGOCALCIFEROL Take 1 Cap by mouth every seven (7) days. fentaNYL 50 mcg/hr PATCH Commonly known as:  Tate Vanessa  
 1 Patch by TransDERmal route every fourty-eight (48) hours. Max Daily Amount: 1 Patch. for chronic, severe, refractory pain. Mylan, Sandoz, or Mallinckrodt brands only Start taking on:  4/25/2017  
  
 fluticasone 50 mcg/actuation nasal spray Commonly known as:  [place] Petite Two sprays in each nostril daily. folic acid 1 mg tablet Commonly known as:  Google Take 1 mg by mouth two (2) times a day. levothyroxine 100 mcg tablet Commonly known as:  SYNTHROID  
TAKE 1 TABLET BY MOUTH DAILY (BEFORE BREAKFAST) lidocaine 5 % ointment Commonly known as:  XYLOCAINE Apply 1-2 g to affected area as needed for Pain. To painful areas  
  
 lubiPROStone 24 mcg capsule Commonly known as:  [place] Arn Take 1 Cap by mouth daily (with breakfast) for 30 days. For chronic constipation  
  
 methocarbamol 750 mg tablet Commonly known as:  ROBAXIN Take 1 Tab by mouth three (3) times daily. As needed for muscle spasms NASONEX 50 mcg/actuation nasal spray Generic drug:  mometasone 2 Sprays daily as needed. ondansetron hcl 8 mg tablet Commonly known as:  Lilia Ann Take 8 mg by mouth. oseltamivir 75 mg capsule Commonly known as:  TAMIFLU Take 1 Cap by mouth two (2) times a day for 5 days. OTHER Apply  to affected area three (3) times daily as needed. VA-CMOD1 compounded cream from [address] [place] [address]  
  
 sennadocusate 8.6-50 mg per tablet Commonly known as:  Melodye [place] Take 2 Tabs by mouth daily. For chronic constipation  
  
 traMADol 50 mg tablet Commonly known as:  ULTRAM  
Take 2 Tabs by mouth three (3) times daily as needed for Pain. Max Daily Amount: 300 mg. Transparent Dressings 3 1/2 X 4 \" Bndg Commonly known as:  TEGADERM  
1 Patch by Apply Externally route every fourty-eight (48) hours. traZODone 50 mg tablet Commonly known as:  Sukhjinder Mallet Take 2 Tabs by mouth nightly. As needed for insomnia ZANTAC 150 mg tablet Generic drug:  raNITIdine Take 150 mg by mouth daily as needed. Prescriptions Sent to Pharmacy Refills  
 oseltamivir (TAMIFLU) 75 mg capsule 0 Sig: Take 1 Cap by mouth two (2) times a day for 5 days. Class: Normal  
 Pharmacy: [address] [place] [address], [id number] [address] [facility] #: [phone] Route: Oral  
  
We Performed the Following AMB POC CHRISTEL INFLUENZA A/B TEST [45428 CPT(R)] Patient Instructions Influenza (Flu): Care Instructions Your Care Instructions Influenza (flu) is an infection in the lungs and breathing passages. It is caused by the influenza virus. There are different strains, or types, of the flu virus from year to year. Unlike the common cold, the flu comes on suddenly and the symptoms, such as a cough, congestion, fever, chills, fatigue, aches, and pains, are more severe. These symptoms may last up to 10 days. Although the flu can make you feel very sick, it usually doesn't cause serious health problems. Home treatment is usually all you need for flu symptoms. But your doctor may prescribe antiviral medicine to prevent other health problems, such as pneumonia, from developing. Older people and those who have a long-term health condition, such as lung disease, are most at risk for having pneumonia or other health problems. Follow-up care is a key part of your treatment and safety. Be sure to make and go to all appointments, and call your doctor if you are having problems. Its also a good idea to know your test results and keep a list of the medicines you take. How can you care for yourself at home? · Get plenty of rest. 
· Drink plenty of fluids, enough so that your urine is light yellow or clear like water. If you have kidney, heart, or liver disease and have to limit fluids, talk with your doctor before you increase the amount of fluids you drink. · Take an over-the-counter pain medicine if needed, such as acetaminophen (Tylenol), ibuprofen (Advil, Motrin), or naproxen (Aleve), to relieve fever, headache, and muscle aches. Read and follow all instructions on the label. No one younger than 20 should take aspirin. It has been linked to Reye syndrome, a serious illness. · Do not smoke. Smoking can make the flu worse. If you need help quitting, talk to your doctor about stop-smoking programs and medicines. These can increase your chances of quitting for good. · Breathe moist air from a hot shower or from a sink filled with hot water to help clear a stuffy nose. · Before you use cough and cold medicines, check the label. These medicines may not be safe for young children or for people with certain health problems. · If the skin around your nose and lips becomes sore, put some petroleum jelly on the area. · To ease coughing: ¨ Drink fluids to soothe a scratchy throat. ¨ Suck on cough drops or plain hard candy. ¨ Take an over-the-counter cough medicine that contains dextromethorphan to help you get some sleep. Read and follow all instructions on the label. ¨ Raise your head at night with an extra pillow. This may help you rest if coughing keeps you awake. · Take any prescribed medicine exactly as directed. Call your doctor if you think you are having a problem with your medicine. To avoid spreading the flu · Wash your hands regularly, and keep your hands away from your face. · Stay home from school, work, and other public places until you are feeling better and your fever has been gone for at least 24 hours. The fever needs to have gone away on its own without the help of medicine. · Ask people living with you to talk to their doctors about preventing the flu. They may get antiviral medicine to keep from getting the flu from you. · To prevent the flu in the future, get a flu vaccine every fall. Encourage people living with you to get the vaccine. · Cover your mouth when you cough or sneeze. When should you call for help? Call 911 anytime you think you may need emergency care. For example, call if: 
· You have severe trouble breathing. Call your doctor now or seek immediate medical care if: 
· You have new or worse trouble breathing. · You seem to be getting much sicker. · You feel very sleepy or confused. · You have a new or higher fever. · You get a new rash. Watch closely for changes in your health, and be sure to contact your doctor if: 
· You begin to get better and then get worse. · You are not getting better after 1 week. Where can you learn more? Go to http://celia-staci.info/. Enter T387 in the search box to learn more about \"Influenza (Flu): Care Instructions. \" Current as of: May 23, 2016 Content Version: 11.2 © 0584-8461 [employer]. Care instructions adapted under license by [employer] (which disclaims liability or warranty for this information). If you have questions about a medical condition or this instruction, always ask your healthcare professional. [place] [address] any warranty or liability for your use of this information. Introducing [facility]! Tanis Epley introduces [employer] patient portal. Now you can access parts of your medical record, email your doctor's office, and request medication refills online. 1. In your internet browser, go to [email or web address] ([email or web address]). [email or web address] ([email or web address]) 2. Click on the First Time User? Click Here link in the Sign In box. You will see the New Member Sign Up page. 3. Enter your [employer] Access Code exactly as it appears below. You will not need to use this code after youve completed the sign-up process. If you do not sign up before the expiration date, you must request a new code. · [employer] Access Code: [email or web address] Expires: 4/23/2017  3:13 PM 
 
 4. Enter the last four digits of your Social Security Number (xxxx) and Date of Birth (mm/dd/yyyy) as indicated and click Submit. You will be taken to the next sign-up page. 5. Create a Braclet ID. This will be your Braclet login ID and cannot be changed, so think of one that is secure and easy to remember. 6. Create a Braclet password. You can change your password at any time. 7. Enter your Password Reset Question and Answer. This can be used at a later time if you forget your password. 8. Enter your e-mail address. You will receive e-mail notification when new information is available in [address] 9. Click Sign Up. You can now view and download portions of your medical record. 10. Click the Download Summary menu link to download a portable copy of your medical information. If you have questions, please visit the Frequently Asked Questions section of the Braclet website. Remember, Braclet is NOT to be used for urgent needs. For medical emergencies, dial 911. Now available from your iPhone and Android! Please provide this summary of care documentation to your next provider. Your primary care clinician is listed as Mahesh 51. If you have any questions after today's visit, please call [phone].

## 2017-04-19 RX ORDER — METHOCARBAMOL 750 MG/1
TABLET, FILM COATED ORAL
Qty: 90 TAB | Refills: 5 | Status: SHIPPED | OUTPATIENT
Start: 2017-04-19 | End: 2017-09-12 | Stop reason: SDUPTHER

## 2017-04-24 ENCOUNTER — TELEPHONE (OUTPATIENT)
Dept: FAMILY MEDICINE CLINIC | Age: 62
End: 2017-04-24

## 2017-04-24 RX ORDER — ERGOCALCIFEROL 1.25 MG/1
CAPSULE ORAL
Qty: 4 CAP | Refills: 10 | Status: SHIPPED | OUTPATIENT
Start: 2017-04-24 | End: 2017-08-04 | Stop reason: ALTCHOICE

## 2017-04-24 RX ORDER — GABAPENTIN 300 MG/1
CAPSULE ORAL
Qty: 90 CAP | Refills: 5 | Status: SHIPPED | OUTPATIENT
Start: 2017-04-24 | End: 2017-08-04 | Stop reason: ALTCHOICE

## 2017-04-24 NOTE — TELEPHONE ENCOUNTER
Patient call, states she is still feeling terrible. Sore throat, coughing and sinus congestion. Patient states she did not take tamiflu do to price. Patient would like a antibiotic. States the pressure in her head is terrible. Labs done at [facility] 04/18/17 printed. Please advise.

## 2017-04-24 NOTE — TELEPHONE ENCOUNTER
Patient called back per Dr Lexi Hussein, explained to patient that she was positive for the FLU, it is viral it does take some time to feel better. This is nothing a antibiotic will help.  Labs looked fine, to be faxed to specialist.

## 2017-05-12 ENCOUNTER — OFFICE VISIT (OUTPATIENT)
Dept: PAIN MANAGEMENT | Age: 62
End: 2017-05-12

## 2017-05-12 VITALS
DIASTOLIC BLOOD PRESSURE: 87 MMHG | HEART RATE: 81 BPM | WEIGHT: 166 LBS | SYSTOLIC BLOOD PRESSURE: 136 MMHG | BODY MASS INDEX: 30.36 KG/M2

## 2017-05-12 DIAGNOSIS — M51.37 DEGENERATION OF LUMBAR OR LUMBOSACRAL INTERVERTEBRAL DISC: ICD-10-CM

## 2017-05-12 DIAGNOSIS — M54.41 CHRONIC MIDLINE LOW BACK PAIN WITH RIGHT-SIDED SCIATICA: ICD-10-CM

## 2017-05-12 DIAGNOSIS — D50.9 IRON DEFICIENCY ANEMIA, UNSPECIFIED IRON DEFICIENCY ANEMIA TYPE: ICD-10-CM

## 2017-05-12 DIAGNOSIS — G89.29 CHRONIC MIDLINE LOW BACK PAIN WITH RIGHT-SIDED SCIATICA: ICD-10-CM

## 2017-05-12 DIAGNOSIS — R76.8 POSITIVE ANA (ANTINUCLEAR ANTIBODY): ICD-10-CM

## 2017-05-12 DIAGNOSIS — R79.89 ELEVATED PTHRP LEVEL: ICD-10-CM

## 2017-05-12 DIAGNOSIS — G89.29 CHRONIC LOW BACK PAIN WITHOUT SCIATICA, UNSPECIFIED BACK PAIN LATERALITY: ICD-10-CM

## 2017-05-12 DIAGNOSIS — R76.8 POSITIVE ANA (ANTINUCLEAR ANTIBODY): Primary | ICD-10-CM

## 2017-05-12 DIAGNOSIS — M96.1 LUMBAR POST-LAMINECTOMY SYNDROME: Primary | ICD-10-CM

## 2017-05-12 DIAGNOSIS — G89.4 CHRONIC PAIN SYNDROME: ICD-10-CM

## 2017-05-12 DIAGNOSIS — Z79.899 ENCOUNTER FOR LONG-TERM (CURRENT) USE OF HIGH-RISK MEDICATION: ICD-10-CM

## 2017-05-12 DIAGNOSIS — M54.50 CHRONIC LOW BACK PAIN WITHOUT SCIATICA, UNSPECIFIED BACK PAIN LATERALITY: ICD-10-CM

## 2017-05-12 LAB
ALCOHOL UR POC: NORMAL
AMPHETAMINES UR POC: NEGATIVE
BARBITURATES UR POC: NEGATIVE
BENZODIAZEPINES UR POC: NORMAL
BUPRENORPHINE UR POC: NORMAL
CANNABINOIDS UR POC: NEGATIVE
CARISOPRODOL UR POC: NORMAL
COCAINE UR POC: NEGATIVE
FENTANYL UR POC: NORMAL
MDMA/ECSTASY UR POC: NEGATIVE
METHADONE UR POC: NEGATIVE
METHAMPHETAMINE UR POC: NEGATIVE
METHYLPHENIDATE UR POC: NEGATIVE
OPIATES UR POC: NEGATIVE
OXYCODONE UR POC: NEGATIVE
PHENCYCLIDINE UR POC: NEGATIVE
PROPOXYPHENE UR POC: NEGATIVE
TRAMADOL UR POC: NORMAL
TRICYCLICS UR POC: NORMAL

## 2017-05-12 RX ORDER — FENTANYL 50 UG/1
1 PATCH TRANSDERMAL
Qty: 15 PATCH | Refills: 0 | Status: SHIPPED | OUTPATIENT
Start: 2017-05-23 | End: 2017-06-22

## 2017-05-12 RX ORDER — NALOXONE HYDROCHLORIDE 4 MG/.1ML
4 SPRAY NASAL AS NEEDED
Qty: 1 BOX | Refills: 1 | Status: SHIPPED | OUTPATIENT
Start: 2017-05-12 | End: 2017-08-04 | Stop reason: ALTCHOICE

## 2017-05-12 RX ORDER — LUBIPROSTONE 24 UG/1
24 CAPSULE, GELATIN COATED ORAL 2 TIMES DAILY WITH MEALS
Qty: 60 CAP | Refills: 5 | Status: SHIPPED | OUTPATIENT
Start: 2017-05-12 | End: 2017-06-11

## 2017-05-12 RX ORDER — ONDANSETRON 8 MG/1
8 TABLET, ORALLY DISINTEGRATING ORAL
Qty: 30 TAB | Refills: 5 | Status: SHIPPED | OUTPATIENT
Start: 2017-05-12 | End: 2017-11-28

## 2017-05-12 RX ORDER — FENTANYL 50 UG/1
1 PATCH TRANSDERMAL
Qty: 15 PATCH | Refills: 0 | Status: SHIPPED | OUTPATIENT
Start: 2017-06-21 | End: 2017-07-21

## 2017-05-12 NOTE — MR AVS SNAPSHOT
Visit Information Date & Time Provider Department Dept. Phone Encounter #  
 5/12/2017  1:20 PM Wanda Shaw MD [facility] [phone] Follow-up Instructions Return in about 2 months (around 7/12/2017). Your Appointments 7/10/2017  2:40 PM  
Follow Up with Nubia Willis PA-C [facility] (WOLF SCHEDULING) Appt Note: return in 4 weeks; return in 2 months 30 [facility] [id number] [phone] [facility] [phone] Upcoming Health Maintenance Date Due Hepatitis C Screening 1955 Pneumococcal 19-64 Highest Risk (1 of 3 - PCV13) 12/23/1976 BREAST CANCER SCRN MAMMOGRAM 10/29/2015 INFLUENZA AGE 9 TO ADULT 8/1/2017 PAP AKA CERVICAL CYTOLOGY 12/13/2019 COLONOSCOPY 3/15/2023 DTaP/Tdap/Td series (2 - Td) 4/1/2025 Allergies as of 5/12/2017  Review Complete On: 5/12/2017 By: Wanda Shaw MD  
  
 Severity Noted Reaction Type Reactions Adhesive    Rash Aspirin  06/20/2011    Other (comments), Nausea and Vomiting G6PD 
GI BLEED AND ULCER Contrast Agent [Iodine]    Rash Allergic to CT Dye  
 Dilantin [Phenytoin Sodium Extended]    Other (comments) Difficulty waking Iodinated Contrast Media - Oral And Iv Dye  05/03/2016    Rash \"bumps on face\" Lipitor [Atorvastatin]  12/13/2016    Other (comments) PKU Pcn [Penicillins]    Rash, Hives \"sores all over the body\" Phenytoin  05/03/2016    Unknown (comments) \"Trouble waking up\" Sulfa (Sulfonamide Antibiotics)    Rash, Nausea and Vomiting G6PD 
G6PD Tegretol [Carbamazepine]    Other (comments), Vertigo \"Trouble waking up\" Difficulty waking up Current Immunizations  Reviewed on 11/25/2015 Name Date Influenza Vaccine 11/25/2015  7:48 AM  
 Tdap 4/1/2015 Not reviewed this visit You Were Diagnosed With   
  
 Codes Comments Encounter for long-term (current) use of high-risk medication    -  Primary ICD-10-CM: C88.545 ICD-9-CM: V58.69 Vitals BP Pulse Weight(growth percentile) LMP BMI OB Status 136/87 81 166 lb (75.3 kg) 08/28/2002 30.36 kg/m2 Postmenopausal  
 Smoking Status Never Smoker Vitals History BMI and BSA Data Body Mass Index Body Surface Area  
 30.36 kg/m 2 1.81 m 2 Preferred Pharmacy Pharmacy Name Phone [facility] [address] [place], [place] - [phone] [facility] [phone] Your Updated Medication List  
  
   
This list is accurate as of: 5/12/17  2:59 PM.  Always use your most recent med list.  
  
  
  
  
 acetaminophen 500 mg tablet Commonly known as:  TYLENOL Take  by mouth every six (6) hours as needed for Pain. CLARITIN 10 mg tablet Generic drug:  loratadine Take 10 mg by mouth daily as needed. diclofenac 3 % topical gel Commonly known as:  Madan Godfrey Apply 2-4 g to affected area two (2) times a day. As needed for joint pain. Apply to painful areas  
  
 doxycycline 100 mg capsule Commonly known as:  VIBRAMYCIN Take 1 capsule twice daily with food for 10 days, remain upright for 45 minutes after each dose. ergocalciferol 50,000 unit capsule Commonly known as:  ERGOCALCIFEROL  
TAKE 1 CAP BY MOUTH EVERY SEVEN DAYS. * fentaNYL 50 mcg/hr PATCH Commonly known as:  DURAGESIC  
1 Patch by TransDERmal route every fourty-eight (48) hours for 30 days. Max Daily Amount: 1 Patch. for chronic, severe, refractory pain. Mylan, Sandoz, or Mallinckrodt brands only  Indications: Chronic Pain with Opioid Tolerance, SEVERE PAIN WITH OPIOID TOLERANCE Start taking on:  5/23/2017 * fentaNYL 50 mcg/hr PATCH Commonly known as:  DURAGESIC  
1 Patch by TransDERmal route every fourty-eight (48) hours for 30 days. Max Daily Amount: 1 Patch. for chronic, severe, refractory pain. Mylan, Sandoz, or Mallinckrodt brands only  Indications: Chronic Pain with Opioid Tolerance, SEVERE PAIN WITH OPIOID TOLERANCE Start taking on:  6/21/2017  
  
 fluticasone 50 mcg/actuation nasal spray Commonly known as:  Kaila Bobo Two sprays in each nostril daily. folic acid 1 mg tablet Commonly known as:  Google Take 1 mg by mouth two (2) times a day.  
  
 gabapentin 300 mg capsule Commonly known as:  NEURONTIN  
TAKE 1 CAP BY MOUTH THREE (3) TIMES DAILY. AS NEEDED FOR NERVE PAIN  
  
 levothyroxine 100 mcg tablet Commonly known as:  SYNTHROID  
TAKE 1 TABLET BY MOUTH DAILY (BEFORE BREAKFAST) lidocaine 5 % ointment Commonly known as:  XYLOCAINE Apply 1-2 g to affected area as needed for Pain. To painful areas  
  
 lubiPROStone 24 mcg capsule Commonly known as:  Daryel Dole Take 1 Cap by mouth two (2) times daily (with meals) for 30 days. For chronic constipation  Indications: OPIOID-INDUCED CONSTIPATION  
  
 methocarbamol 750 mg tablet Commonly known as:  ROBAXIN  
TAKE 1 TAB BY MOUTH THREE (3) TIMES DAILY. AS NEEDED FOR MUSCLE SPASMS  
  
 naloxone 4 mg/actuation Spry 4 mg by Nasal route as needed for up to 2 doses. Indications: OPIOID TOXICITY  
  
 NASONEX 50 mcg/actuation nasal spray Generic drug:  mometasone 2 Sprays daily as needed. ondansetron 8 mg disintegrating tablet Commonly known as:  ZOFRAN ODT Take 1 Tab by mouth every eight (8) hours as needed for Nausea. ondansetron hcl 8 mg tablet Commonly known as:  Ese Dubose Take 8 mg by mouth. OTHER Apply  to affected area three (3) times daily as needed. VA-CMOD1 compounded cream from [address] [place] [address]  
  
 senna-docusate 8.6-50 mg per tablet Commonly known as:  Hung Bumps Take 2 Tabs by mouth daily. For chronic constipation  
  
 traMADol 50 mg tablet Commonly known as:  Dhaval Cooler  
 Take 2 Tabs by mouth three (3) times daily as needed for Pain. Max Daily Amount: 300 mg. Transparent Dressings 3 1/2 X 4 \" Bndg Commonly known as:  TEGADERM  
1 Patch by Apply Externally route every fourty-eight (48) hours. traZODone 50 mg tablet Commonly known as:  Croatian Never Take 2 Tabs by mouth nightly. As needed for insomnia ZANTAC 150 mg tablet Generic drug:  raNITIdine Take 150 mg by mouth daily as needed. * Notice: This list has 2 medication(s) that are the same as other medications prescribed for you. Read the directions carefully, and ask your doctor or other care provider to review them with you. Prescriptions Printed Refills  
 fentaNYL (DURAGESIC) 50 mcg/hr PATCH 0 Starting on: 2017 Si Patch by TransDERmal route every fourty-eight (48) hours for 30 days. Max Daily Amount: 1 Patch. for chronic, severe, refractory pain. Mylan, Sandoz, or Mallinckrodt brands only  Indications: Chronic Pain with Opioid Tolerance, SEVERE PAIN WITH OPIOID TOLERANCE Class: Print Route: TransDERmal  
 fentaNYL (DURAGESIC) 50 mcg/hr PATCH 0 Starting on: 2017 Si Patch by TransDERmal route every fourty-eight (48) hours for 30 days. Max Daily Amount: 1 Patch. for chronic, severe, refractory pain. Mylan, Sandoz, or Mallinckrodt brands only  Indications: Chronic Pain with Opioid Tolerance, SEVERE PAIN WITH OPIOID TOLERANCE Class: Print Route: TransDERmal  
  
Prescriptions Sent to Pharmacy Refills  
 lubiPROStone (AMITIZA) 24 mcg capsule 5 Sig: Take 1 Cap by mouth two (2) times daily (with meals) for 30 days. For chronic constipation  Indications: OPIOID-INDUCED CONSTIPATION Class: Normal  
 Pharmacy: [facility]/pharmacy #[id number] [facility] [address] [place] [address] #: [phone] Route: Oral  
 ondansetron (ZOFRAN ODT) 8 mg disintegrating tablet 5 Sig: Take 1 Tab by mouth every eight (8) hours as needed for Nausea. Class: Normal  
 Pharmacy: [facility]/pharmacy #[id number] - [place] [address] Ph #: [phone] Route: Oral  
 naloxone 4 mg/actuation spry 1 Si mg by Nasal route as needed for up to 2 doses. Indications: OPIOID TOXICITY Class: Normal  
 Pharmacy: [facility]/pharmacy #[id number] - [address] Ph #: [phone] Route: Nasal  
  
We Performed the Following AMB POC DRUG SCREEN () [ [facility]] DRUG SCREEN [[id number] Custom] Follow-up Instructions Return in about 2 months (around 2017). Patient Instructions Pregnenolone 100 mg daily Follow-up with gi and rheumatology Current health maintenance issues were reviewed and the patient was advised to followup with his/her PCP for completion of these items. AMMON Screen POSITIVE (A) NEGATIVE NEGATIVE NEGATIVE  
1:40 (A)        
Negative        
  
 
  
AMMON Speckled AMMON Homogeneous AMMON Nucleolar AMMON Peripheral  
Centromere Antibody COMPLEMENT STUDIES Component Name 2017    
115  
20  
C3  
C4 GENERAL CHEMISTRY Component Name 2017 2015 2015 3/19/2015 2014 2014 3/19/2014 2014 2014 10/21/2013 10/21/2013 2013 2013 3/5/2013 2012 2012 2012 2012 10/11/2012 2012 2012 7/3/2012 2012 3/5/2012 3/1/2012 2012 2011 2010 10/21/2010 2010 2010 2010 2010 11/10/2009 10/20/2009 2009 12/15/2008 2008 2008    
3.9 4.4   3.8 4 4.2 3.3 (L) 3.8 3.8   4.3 4.1 3.6   4.3   3.7   2.9 (AA) 3.6 3.5 3.9 3.5 3.9 4.3 4.4 3.7 4.1 3.2 (L) 4.0 3.8 3.6 3.6   4.1 4.2 3.5 4.0 3.5  
99 104   102 102 101 100 103 100   103 103 101   104   104   102 100 97 (L) 103 103 103 102 105 105 104 99 105 102 102 102   103 101 102 102 102  
  4.7           4.4       4.8 4.7 4.8   4.8 4.7     4.3 (L)   4.8         4.5   4.4 4.6 4.6 4.2 (L)             4.9  
28 27   28 25 31 28 29.0 27   27 26.0 26.0   26   25.0   26 30.0 30 28.0 27 28 29 30 29.0 28 29.0 29.0 28.0 27.0 26   30 28 28 30 28.0  
83 101 (H)   88 88 98 71 98 98   85 100 (H) 103 (H)   106 (H)   91   93 99 99 100 (H) 95 111 (H) 116 (H) 85 91 89 81 89 91 90 95   88 102 (H) 104 (H) 96 101 (H) 15 15   7 17 12 10 11 12   21 20 8   15   14   13 7 9 10 14 7 9 14 10 13 7 10 11 6 11   11 12 6 13 6  
0.8 1   0.9 0.9 0.9 0.7 1.0 1.0   1.1 1.1 1.1   0.9   0.9   0.8 1.0 0.8 0.9 0.7 0.7 0.8 0.7 0.8 0.9 1.1 0.9 1.0 1.1 0.8   0.9 0.8 0.7 0.8 0.9  
140 143   141 139 139 138 140 138   142 143 141   140   142   140 137 137 141 140 139 138 143 140 141 140 142 139 140 141   139 137 141 140 139  
3.9   4.3 4.4     4.1                     4.5     4.6   4.4         4.4             4.4   4.4 4.2    
0.3   0.4                             0.5     0.3   0.4         0.5             0.4   0.6 0.5    
8.7     9.1 9.6 9.9 8.9   9.3   9.6       8.7       8.7   9.2   9.1 8.5 8.3 (L) 9.3   8.7         9.3   9.9 9.0 9.0 9.2    
58   71                             95     143 (H)   63         57             58   74 67    
7.0   8.2                             7.6     7.5   7.1         7.4             8.0   7.9 7.8    
17   20                             21     22   27         21             18   17 19    
3.1   3.9                             3.1     2.9   2.7         3.0             3.6   3.5 3.6    
1.3   1.1                             1.5     1.6   1.6         1.5             1.2   1.3 1.2    
8   13                             19     18   27         21             12   11 11    
12. 9       12.2 7     11.2   11. 6       10. 5       12.3   10.3   10.0 8.1 6.9 7.8   9.0         12.7   6.0 8.0 11.0 8.0    
                              34 (L)                       53           49       61    
                              210 (H)                       335 (H)           284 (H)       256 (H)    
                               162 (H)                       159 (H)           111       61    
                              143 (H)                       250 (H)           213 (H)       185 (H)    
    <0.2                             <0.2 (A)     <0.2 (A)                           0.0          
    44                             26     17                           43          
            56   56   55       54       54   54   54 54 54 54   52                        
            BLACK   BLACK   BLACK       BLACK       BLACK   BLACK   OTHER/UNK BLACK BLACK BLACK   6                        
        >60.0 >60.0 >60   >60   60       >60       >60   >60   >60 >60 >60 >60   >60                        
          2.2                   2.2                 1.7                              
      3.3     2.5                 3.7                 3.4                              
                          4.6                                                    
            RANDOM     RANDOM RANDOM                                                          
            RANDOM     RANDOM RANDOM                                                          
                    13                                                          
>60.0     >60.0                                                                        
>60.0     >60.0                                                                        
POTASSIUM  
CHLORIDE CALCIUM IONIZED  
CO2 GLUCOSE  
BUN  
CREATININE  
SODIUM  
ALBUMIN  
BILIRUBIN TOTAL  
CALCIUM ALKALINE PHOSPHATASE TOTAL PROTEIN  
SGOT (AST) GLOBULIN SERUM A/G RATIO  
SGPT (ALT) ANION GAP  
HDL CHOLESTEROL TRIGLYCERIDE  
LDL CALCULATION  
BILIRUBIN DIRECT  
LIPASE  
GFR AGE  
GFR RACE  
GFR CALCULATION  
MAGNESIUM  
PHOSPHORUS  
URIC ACID Collection Period TOTAL VOLUME TOTAL PROTEIN URINE  
eGFR  eGFR Non  GENERAL HEMATOLOGY Component Name 4/18/2017 4/16/2015 4/16/2015 3/19/2015 11/17/2014 3/19/2014 2/20/2014 2/20/2014 2/20/2014 8/17/2013 8/17/2013 6/25/2013 6/25/2013 12/2/2012 12/1/2012 12/1/2012 10/11/2012 9/24/2012 9/24/2012 7/3/2012 7/3/2012 5/4/2012 4/4/2012 3/5/2012 3/2/2012 3/1/2012 3/1/2012 2/21/2012 1/19/2011 1/19/2011 11/19/2010 10/21/2010 10/21/2010 8/20/2010 8/20/2010 8/12/2010 8/12/2010 5/12/2010 5/12/2010 4/21/2010 10/20/2009 7/6/2009 12/15/2008 4/30/2008 3/22/2008 1/16/2008 1/16/2008    
11.3 (L) 13.6 12.8 12.3 12.5 11.8 13.6   12.8 13.9 12.6 13.3 12.4 10.4 (L) 12.9 11.9 9.4 (L) 11.9 10.8 (L) 13.6 12.9 12.0 12.2 9.8 (L) 9.6 (L) 8.0 (L) 9.8 (L) 11.8 12.6 12.3 12.6 14.3 13.4 13.3 12.6 13.6 13.0 13.3 12.1 12.4 13.3 12.3 11.9 11.4 (L) 11.8 15.0 13.0  
35.5 40 39.5 37.8 38.1 36.3 40   39.2 41 38.2 39 37.4 32.9 (L) 38 35.8 29.8 (L) 35 (L) 33.3 (L) 40 39.1 38.7 37.8 29.8 (L) 29.9 (L) 25 (L) 30.2 (L) 36.1 37 38.6 37.9 42 42.9 39 39.8 40 40.5 39 38.2 38 42.2 35.5 35.9 36.7 37 44 41  
5.3   5.7 6.2 6.5       6.9   5.5   6.3 6.2   5.5 21.4 (H)   35.7 (H)   5.9 11.9 (H) 5.3 7.3 7   6.3 6.6   5.1 6.5   7.1   6   5.6   5.9 6.4 7 5.2 5.5 4.7 7.1   6  
3.83   4.31 4.15 4.29       4.38   4.30   4.21 3.61 (L)   3.96 3.12 (L)   3.64 (L)   4.65 4.26 4.24 3.43 (L) 3.34 (L)   3.43 (L) 4.24   4.33 4.28   4.76   4.51   4.60   4.23 4.36 4.84 4.15 4.19 4.30 4.30   4.75  
93   92 91 89       90   89   89 91   90 96 (H)   92   84 91 89 87 90   88 85   89 89   90   88   88   90 87 87 85 86 85 89   91  
30   30 30 29       29   29   30 29   30 30   30   28 28 29 29 29   29 28   28 29   28   28   28   29 29 27 30 29 27 28   27  
32   32 33 33       33   33   33 32   33 32   33   33 31 (L) 32 33 32   33 33   32 33   31 (L)   32   32   32 33 31 (L) 35 33 31 (L) 32   32  
    270 230 261     236 236   248   237 283   303 203   218   289 242 239 261 169   237 279   247 283   284   271   271   260 277 282 247 243 218 245   268 9.8   9.3 9.1 8.9       9   9.5   8.9 9.3   6.6 7.1   6.8   6.1 7.6 6.6 9.7 9.8   9.8 6.4   5.8 (L) 7.2   6.6   6.2   6.4   5.5 (L) 7.3 6.5 (L) 6.9 (L) 6.4 (L) 5.3 (L) 7.6   7.2 (L)  
    59   44     61 61   59   55 43   50 77 (H)   81 (H)   60 65 42       69 47   36 (L) 57   48   52   51   47   53 52 57 50 51   63  
    33   48 (H)     30 30   32   35 38   32 12 (L)   6 (L)   30 26 (L) 45       18 (L) 42   52 (H) 35   41   38   39   42   37 37 33 39 41   29  
    7   7     9 9   8   10 (H) 12 (H)   13 (H) 3   1 (L)   8 9 10 (H)       13 (H) 9   10 7   9   9   9   9   8 9 8 10 7   7  
    0   1     0 0   1   1 7 (H)   3 0   0   1 0 3       0 2   2 1   2   1   1   1   1 1 0 1 1   1  
                                MANUAL   AUTO       AUTO       AUTO AUTO   AUTO     AUTO   AUTO   AUTO   AUTO   AUTO AUTO AUTO AUTO AUTO   AUTO  
    1   0     0 0   0   0 1   1 0   0   1 0 1       0 1   1 1   1   1   1   1   1 1 1 1 1      
12.0   12.6 12.2 12.5       12.1   12.3   12.1 13.9   13.7 15.1   15.6   11.3 12.2 12.6 13.6 14.2   12.7 11.8   11.3 11.4   12   10.9   10.9   11.7 11.1 10.9 (L) 13.1 13 12.8        
                                8 (H)   8 (H)       0       0 0   0 0   0   0                          
    3.3   2.9     4.2 4.2   3.3   3.5 2.7   2.8 18.2 (H)   31.8 (H)   3.6 7.7 2.2       4.3 3.1   1.8 3.7   3.4   3.1                          
    1.9   3.1     2.1 2.1   1.7   2.2 2.4   1.8 2.6   2.1   1.8 3.1 2.4       1.1 2.8   2.7 2.3   2.9   2.3                          
    0.4   0.5     0.6 0.6   0.5   0.6 0.7   0.7 0.6   0.4   0.5 1.1 (H) 0.5       0.8 0.6   0.5 0.5   0.6   0.5                          
    0   0     0 0   0   0.1 0.4   0.2 0   0   0 0 0.2       0 0.1   0.1 0.1   0.1   0.1                          
    0   0     0 0   0   0 0.1   0.1 0   0   0.1 0 0. 1       0 0.1   0.1 0.1   0.1   0.1                          
                                          13                                                    
                                    4 (H)                                                          
                                    Marked leuk. ..                                                          
                                1+ (A)                                                              
                                1+ (A)                                                              
13                                                                                              
254                                                                                              
HGB  
HCT  
WBC x 10*3 RBC x 10^6 MCV  
MCH  
MCHC PLATELET  
MPV SEGMENTED NEUTROPHILS  
LYMPHOCYTES MONOCYTES  
EOSINOPHIL  
DIFFERENTIAL TYPE  
BASOPHILS  
RDW  
BANDS ABSOLUTE NEUTROPHILS ABSOLUTE LYMPHOCYTES ABSOLUTE MONOCYTE COUNT ABSOLUTE EOSINOPHIL ABSOLUTE BASOPHIL COUNT  
SEDIMENTATION RATE METAMYELOCYTES  
PATH REVIEW  
TARGET CELLS  
ROULEAUX Sedimentation Rate Platelet GENERAL SEROLOGY Component Name 4/18/2017 8/1/2013 6/28/2012 5/29/2012 5/4/2012 9/2/2010 1/2/2009    
            NONREACT  
<20 <20     <20 FOOTNOTE    
          2    
    EDTA BLD EDTA BLD        
      NEGATIVE        
      FOOTNOTE        
      0.04        
      0.06        
      3.46        
      < 0.00        
      FOOTNOTE        
      Negative        
HIV-1/O/2 ANTIBODIES  
RHEUMATOID FACTOR QUANTITATIVE Citrulline Antibody SPECIMEN SOURCE Quantiferon TB Gold Quantiferon TB Positive Criteria Quantiferon TB AG Value Quantiferon Nil Value Quantiferon Mitogen Value Quantiferon Calculation Quantiferon Interpretation Histoplasma ID Blastomycosis ID Coccidiomycosis ID Aspergillus Fumigatus ID Aspergillus Flavus ID Aspergillus [place] ID  
 GENERAL STUDIES Component Name 4/18/2017    
115  
20  
C3  
C4 HEPATITIS TESTING Component Name 4/18/2017    
None Detected HEP C AB HORMONE STUDIES Component Name 4/18/2017 5/13/2014 12/15/2008    
    19.6  
  6.1    
79 (H)      
<10      
CORTISOL Aldosterone PTH INTACT Pregnenolone THYROID STUDIES Component Name 4/18/2017 2/20/2014 12/2/2012 5/4/2012 11/19/2010 8/18/2010 11/10/2009 7/6/2009 12/15/2008 4/30/2008    
0.41 0.53 3.67 1.93 1.41 2.03 0.98 1.14 0.35 0.12 (L)                   1.7 TSH  
T4 FREE  
 VITAMINS Component Name 4/18/2017 3/19/2015 8/1/2013 11/19/2010 8/18/2010    
        721  
  31.9 (L) 37.6 8.9 (L)    
70. 3          
VITAMIN B12  
VITAMIN D, 25 HYDROXY Vitamin D 1,25 Dihydroxy Introducing [address]! [facility] introduces [employer] patient portal. Now you can access parts of your medical record, email your doctor's office, and request medication refills online. 1. In your internet browser, go to [email or web address] 2. Click on the First Time User? Click Here link in the Sign In box. You will see the New Member Sign Up page. 3. Enter your [employer] Access Code exactly as it appears below. You will not need to use this code after youve completed the sign-up process. If you do not sign up before the expiration date, you must request a new code. · [employer] Access Code: [email or web address] Expires: 8/10/2017  1:54 PM 
 
4. Enter the last four digits of your Social Security Number (xxxx) and Date of Birth (mm/dd/yyyy) as indicated and click Submit. You will be taken to the next sign-up page. 5. Create a [employer] ID. This will be your [employer] login ID and cannot be changed, so think of one that is secure and easy to remember. 6. Create a [employer] password. You can change your password at any time. 7. Enter your Password Reset Question and Answer. This can be used at a later time if you forget your password. 8. Enter your e-mail address. You will receive e-mail notification when new information is available in [address] 9. Click Sign Up. You can now view and download portions of your medical record. 10. Click the Download Summary menu link to download a portable copy of your medical information. If you have questions, please visit the Frequently Asked Questions section of the [employer] website. Remember, [employer] is NOT to be used for urgent needs. For medical emergencies, dial 911. Now available from your iPhone and Android! Please provide this summary of care documentation to your next provider. Your primary care clinician is listed as [id number]. If you have any questions after today's visit, please call [phone].

## 2017-05-12 NOTE — PROGRESS NOTES
Met with MsAna Cristina Ralph Grayson'  and discussed her recent labs and progress in terms of her back pan. Multiple somatic complaints of extreme fatigue, dizziness, and constipation persist. It is unlikely that these symptoms are due to her work-related back injury. However, given abnormal labs that were obtained while she was suffering with influenza, we will repeat these labs and refer to rheumatology if still abnormal.     A total of 25 minutes was spent with the patient's .

## 2017-05-12 NOTE — PATIENT INSTRUCTIONS
Pregnenolone 100 mg daily    Follow-up with gi and rheumatology    Current health maintenance issues were reviewed and the patient was advised to followup with his/her PCP for completion of these items.     AMMON Screen                                       POSITIVE (A) NEGATIVE NEGATIVE NEGATIVE   1:40 (A)         Negative         Negative         Negative         Negative                 AMMON Speckled   AMMON Homogeneous   AMMON Nucleolar   AMMON Peripheral   Centromere Antibody    COMPLEMENT STUDIES  Component Name  4/18/2017     115   20   C3   C4    GENERAL CHEMISTRY  Component Name  4/18/2017 4/16/2015 4/16/2015 3/19/2015 11/17/2014 5/13/2014 3/19/2014 2/20/2014 2/20/2014 10/21/2013 10/21/2013 8/17/2013 6/25/2013 3/5/2013 12/2/2012 12/1/2012 12/1/2012 12/1/2012 10/11/2012 9/24/2012 9/24/2012 7/3/2012 5/4/2012 3/5/2012 3/1/2012 2/21/2012 1/19/2011 11/19/2010 10/21/2010 8/20/2010 8/12/2010 5/12/2010 4/21/2010 11/10/2009 10/20/2009 7/6/2009 12/15/2008 4/30/2008 1/16/2008     3.9 4.4   3.8 4 4.2 3.3 (L) 3.8 3.8   4.3 4.1 3.6   4.3   3.7   2.9 (AA) 3.6 3.5 3.9 3.5 3.9 4.3 4.4 3.7 4.1 3.2 (L) 4.0 3.8 3.6 3.6   4.1 4.2 3.5 4.0 3.5   99 104   102 102 101 100 103 100   103 103 101   104   104   102 100 97 (L) 103 103 103 102 105 105 104 99 105 102 102 102   103 101 102 102 102     4.7           4.4       4.8 4.7 4.8   4.8 4.7     4.3 (L)   4.8         4.5   4.4 4.6 4.6 4.2 (L)             4.9   28 27   28 25 31 28 29.0 27   27 26.0 26.0   26   25.0   26 30.0 30 28.0 27 28 29 30 29.0 28 29.0 29.0 28.0 27.0 26   30 28 28 30 28.0   83 101 (H)   88 88 98 71 98 98   85 100 (H) 103 (H)   106 (H)   91   93 99 99 100 (H) 95 111 (H) 116 (H) 85 91 89 81 89 91 90 95   88 102 (H) 104 (H) 96 101 (H)   15 15   7 17 12 10 11 12   21 20 8   15   14   13 7 9 10 14 7 9 14 10 13 7 10 11 6 11   11 12 6 13 6   0.8 1   0.9 0.9 0.9 0.7 1.0 1.0   1.1 1.1 1.1   0.9   0.9   0.8 1.0 0.8 0.9 0.7 0.7 0.8 0.7 0.8 0.9 1.1 0.9 1.0 1.1 0.8   0.9 0.8 0.7 0.8 0.9 140 143   141 139 139 138 140 138   142 143 141   140   142   140 137 137 141 140 139 138 143 140 141 140 142 139 140 141   139 137 141 140 139   3.9   4.3 4.4     4.1                     4.5     4.6   4.4         4.4             4.4   4.4 4.2     0.3   0.4                             0.5     0.3   0.4         0.5             0.4   0.6 0.5     8.7     9.1 9.6 9.9 8.9   9.3   9.6       8.7       8.7   9.2   9.1 8.5 8.3 (L) 9.3   8.7         9.3   9.9 9.0 9.0 9.2     58   71                             95     143 (H)   63         57             58   74 67     7.0   8.2                             7.6     7.5   7.1         7.4             8.0   7.9 7.8     17   20                             21     22   32         21             18   17 19     3.1   3.9                             3.1     2.9   2.7         3.0             3.6   3.5 3.6     1.3   1.1                             1.5     1.6   1.6         1.5             1.2   1.3 1.2     8   13                             19     18   27         21             12   11 11     12. 9       12.2 7     11.2   11. 6       10. 5       12.3   10.3   10.0 8.1 6.9 7.8   9.0         12.7   6.0 8.0 11.0 8.0                                   34 (L)                       53           49       61                                   210 (H)                       335 (H)           284 (H)       256 (H)                                   162 (H)                       159 (H)           111       61                                   143 (H)                       250 (H)           213 (H)       185 (H)         <0.2                             <0.2 (A)     <0.2 (A)                           0.0               44                             32     17                           43                       56   56   55       54       54   54   54 54 54 54   52                                     BLACK   BLACK   BLACK       BLACK       BLACK   BLACK   OTHER/UNK BLACK BLACK BLACK   6                               >60.0 >60.0 >60   >60   60       >60       >60   >60   >60 >60 >60 >60   >60                                   2.2                   2.2                 1.7                                     3.3     2.5                 3.7                 3.4                                                         4.6                                                                 RANDOM     RANDOM RANDOM                                                                       RANDOM     RANDOM RANDOM                                                                               13                                                           >60.0     >60.0                                                                         >60.0     >60.0                                                                         POTASSIUM   CHLORIDE   CALCIUM IONIZED   CO2   GLUCOSE   BUN   CREATININE   SODIUM   ALBUMIN   BILIRUBIN TOTAL   CALCIUM   ALKALINE PHOSPHATASE   TOTAL PROTEIN   SGOT (AST)   GLOBULIN SERUM   A/G RATIO   SGPT (ALT)   ANION GAP   HDL   CHOLESTEROL   TRIGLYCERIDE   LDL CALCULATION   BILIRUBIN DIRECT   LIPASE   GFR AGE   GFR RACE   GFR CALCULATION   MAGNESIUM   PHOSPHORUS   URIC ACID   Collection Period   TOTAL VOLUME   TOTAL PROTEIN URINE   eGFR    eGFR Non     GENERAL HEMATOLOGY  Component Name  4/18/2017 4/16/2015 4/16/2015 3/19/2015 11/17/2014 3/19/2014 2/20/2014 2/20/2014 2/20/2014 8/17/2013 8/17/2013 6/25/2013 6/25/2013 12/2/2012 12/1/2012 12/1/2012 10/11/2012 9/24/2012 9/24/2012 7/3/2012 7/3/2012 5/4/2012 4/4/2012 3/5/2012 3/2/2012 3/1/2012 3/1/2012 2/21/2012 1/19/2011 1/19/2011 11/19/2010 10/21/2010 10/21/2010 8/20/2010 8/20/2010 8/12/2010 8/12/2010 5/12/2010 5/12/2010 4/21/2010 10/20/2009 7/6/2009 12/15/2008 4/30/2008 3/22/2008 1/16/2008 1/16/2008     11.3 (L) 13.6 12.8 12.3 12.5 11.8 13.6   12.8 13.9 12.6 13.3 12.4 10.4 (L) 12.9 11.9 9.4 (L) 11.9 10.8 (L) 13.6 12.9 12.0 12.2 9.8 (L) 9.6 (L) 8.0 (L) 9.8 (L) 11.8 12.6 12.3 12.6 14.3 13.4 13.3 12.6 13.6 13.0 13.3 12.1 12.4 13.3 12.3 11.9 11.4 (L) 11.8 15.0 13.0   35.5 40 39.5 37.8 38.1 36.3 40   39.2 41 38.2 39 37.4 32.9 (L) 38 35.8 29.8 (L) 35 (L) 33.3 (L) 40 39.1 38.7 37.8 29.8 (L) 29.9 (L) 25 (L) 30.2 (L) 36.1 37 38.6 37.9 42 42.9 39 39.8 40 40.5 39 38.2 38 42.2 35.5 35.9 36.7 37 44 41   5.3   5.7 6.2 6.5       6.9   5.5   6.3 6.2   5.5 21.4 (H)   35.7 (H)   5.9 11.9 (H) 5.3 7.3 7   6.3 6.6   5.1 6.5   7.1   6   5.6   5.9 6.4 7 5.2 5.5 4.7 7.1   6   3.83   4.31 4.15 4.29       4.38   4.30   4.21 3.61 (L)   3.96 3.12 (L)   3.64 (L)   4.65 4.26 4.24 3.43 (L) 3.34 (L)   3.43 (L) 4.24   4.33 4.28   4.76   4.51   4.60   4.23 4.36 4.84 4.15 4.19 4.30 4.30   4.75   93   92 91 89       90   89   89 91   90 96 (H)   92   84 91 89 87 90   88 85   89 89   90   88   88   90 87 87 85 86 85 89   91   30   30 30 29       29   29   30 29   30 30   30   28 28 29 29 29   29 28   28 29   28   28   28   29 29 27 30 29 27 28   27   32   32 33 33       33   33   33 32   33 32   33   33 31 (L) 32 33 32   33 33   32 33   31 (L)   32   32   32 33 31 (L) 35 33 31 (L) 32   32       270 230 261     236 236   248   237 283   303 203   218   289 242 239 261 169   237 279   247 283   284   271   271   260 277 282 247 243 218 245   268   9.8   9.3 9.1 8.9       9   9.5   8.9 9.3   6.6 7.1   6.8   6.1 7.6 6.6 9.7 9.8   9.8 6.4   5.8 (L) 7.2   6.6   6.2   6.4   5.5 (L) 7.3 6.5 (L) 6.9 (L) 6.4 (L) 5.3 (L) 7.6   7.2 (L)       59   44     61 61   59   55 43   50 77 (H)   81 (H)   60 65 42       69 47   36 (L) 57   48   52   51   47   53 52 57 50 51   63       33   48 (H)     30 30   32   35 38   32 12 (L)   6 (L)   30 26 (L) 45       18 (L) 42   52 (H) 35   41   38   39   42   37 37 33 39 41   29       7   7     9 9   8   10 (H) 12 (H)   13 (H) 3   1 (L)   8 9 10 (H)       13 (H) 9   10 7   9   9   9   9   8 9 8 10 7   7       0   1     0 0   1   1 7 (H)   3 0   0   1 0 3       0 2   2 1   2   1   1   1   1 1 0 1 1   1                                   MANUAL   AUTO       AUTO       AUTO AUTO   AUTO     AUTO   AUTO   AUTO   AUTO   AUTO AUTO AUTO AUTO AUTO   AUTO       1   0     0 0   0   0 1   1 0   0   1 0 1       0 1   1 1   1   1   1   1   1 1 1 1 1       12.0   12.6 12.2 12.5       12.1   12.3   12.1 13.9   13.7 15.1   15.6   11.3 12.2 12.6 13.6 14.2   12.7 11.8   11.3 11.4   12   10.9   10.9   11.7 11.1 10.9 (L) 13.1 13 12.8                                         8 (H)   8 (H)       0       0 0   0 0   0   0                               3.3   2.9     4.2 4.2   3.3   3.5 2.7   2.8 18.2 (H)   31.8 (H)   3.6 7.7 2.2       4.3 3.1   1.8 3.7   3.4   3.1                               1.9   3.1     2.1 2.1   1.7   2.2 2.4   1.8 2.6   2.1   1.8 3.1 2.4       1.1 2.8   2.7 2.3   2.9   2.3                               0.4   0.5     0.6 0.6   0.5   0.6 0.7   0.7 0.6   0.4   0.5 1.1 (H) 0.5       0.8 0.6   0.5 0.5   0.6   0.5                               0   0     0 0   0   0.1 0.4   0.2 0   0   0 0 0.2       0 0.1   0.1 0.1   0.1   0.1                               0   0     0 0   0   0 0.1   0.1 0   0   0.1 0 0. 1       0 0.1   0.1 0.1   0.1   0.1                                                                     13                                                                                         4 (H)                                                                                               Marked leuk. ..                                                                                           1+ (A)                                                                                               1+ (A)                                                               13                                                                                               254                                                                                               HGB   HCT   WBC x 10*3 RBC x 10^6   MCV   MCH   MCHC   PLATELET   MPV   SEGMENTED NEUTROPHILS   LYMPHOCYTES   MONOCYTES   EOSINOPHIL   DIFFERENTIAL TYPE   BASOPHILS   RDW   BANDS   ABSOLUTE NEUTROPHILS   ABSOLUTE LYMPHOCYTES   ABSOLUTE MONOCYTE COUNT   ABSOLUTE EOSINOPHIL   ABSOLUTE BASOPHIL COUNT   SEDIMENTATION RATE   METAMYELOCYTES   PATH REVIEW   TARGET CELLS   ROULEAUX   Sedimentation Rate   Platelet    GENERAL SEROLOGY  Component Name  4/18/2017 8/1/2013 6/28/2012 5/29/2012 5/4/2012 9/2/2010 1/2/2009                 NONREACT   <20 <20     <20 FOOTNOTE               2         EDTA BLD EDTA BLD               NEGATIVE               FOOTNOTE               0.04               0.06               3.46               < 0.00               FOOTNOTE               Negative               Negative               Negative               Negative               Negative               Negative         HIV-1/O/2 ANTIBODIES   RHEUMATOID FACTOR QUANTITATIVE   Citrulline Antibody   SPECIMEN SOURCE   Quantiferon TB Gold   Quantiferon TB Positive Criteria   Quantiferon TB AG Value   Quantiferon Nil Value   Quantiferon Mitogen Value   Quantiferon Calculation   Quantiferon Interpretation   Histoplasma ID   Blastomycosis ID   Coccidiomycosis ID   Aspergillus Fumigatus ID   Aspergillus Flavus ID   Aspergillus Benjamin ID    GENERAL STUDIES  Component Name  4/18/2017     115   20   C3   C4    HEPATITIS TESTING  Component Name  4/18/2017     None Detected   HEP C AB    HORMONE STUDIES  Component Name  4/18/2017 5/13/2014 12/15/2008         19.6     6.1     79 (H)       <10       CORTISOL   Aldosterone   PTH INTACT   Pregnenolone    THYROID STUDIES  Component Name  4/18/2017 2/20/2014 12/2/2012 5/4/2012 11/19/2010 8/18/2010 11/10/2009 7/6/2009 12/15/2008 4/30/2008     0.41 0.53 3.67 1.93 1.41 2.03 0.98 1.14 0.35 0.12 (L)                     1.7   TSH   T4 FREE    VITAMINS  Component Name  4/18/2017 3/19/2015 8/1/2013 11/19/2010 8/18/2010             721     31.9 (L) 37.6 8.9 (L)     70. 3           VITAMIN B12   VITAMIN D, 25 HYDROXY   Vitamin D 1,25 Dihydroxy

## 2017-05-12 NOTE — PROGRESS NOTES
Nursing Notes    Patient presents to the office today in follow-up. Patient rates her pain at 6/10 on the numerical pain scale. Reviewed medications with counts as follows:    Rx Date filled Qty Dispensed Pill Count Last Dose Short   Fentanyl 50 4/25/17 15 6+1 on Placed 5/10/17 no   Tramadol 50 4/11/17 180 76.5 5/12/17 no         Comments:     POC UDS was performed in office today    Any new labs or imaging since last appointment? Blood work from us    Have you been to an emergency room (ER) or urgent care clinic since your last visit? NO            Have you been hospitalized since your last visit? NO     If yes, where, when, and reason for visit? Have you seen or consulted any other health care providers outside of the [address] [place] [address]  since your last visit? NO     If yes, where, when, and reason for visit? Ms. Champ Mixon has a reminder for a \"due or due soon\" health maintenance. I have asked that she contact her primary care provider for follow-up on this health maintenance.

## 2017-05-12 NOTE — PROGRESS NOTES
HISTORY OF PRESENT ILLNESS  Nicole Tadeo is a 63 y.o. female. HPI she returns for follow-up of chronic, severe pain involving the lumbar spine with radiation to the buttocks and both thighs with extension to the right foot with there is associated numbness and paresthesia. 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 Since last seen, she underwent a battery of laboratory testing which was reviewed with abnormalities noted as follows:  Hemoglobin has decreased to 11.8. She is scheduled to see her gastroenterologist for further follow-up and may require hematologic assessment as well. AMMON was noted to be positive at 1-40 with a speckled pattern. This is generally considered nonspecific and nondiagnostic. Her AMMON was noted to be normal or negative in 2015. Rheumatology evaluation is being recommended. Pregnenolone level was less than 10. Supplementation with 100 mg daily will be recommended. She has been undergoing aqua therapy. She has been complaining of profound tiredness she has been having sleeping spells and difficulty concentrating. Low back pain radiating to the right leg and both feet which is especially worse in addition to being upright is noted. Pain level today 6 out of 10 with 7080% overall pain relief being reported. Outcome 11/28,(The lower the upper number, the better the outcome)Physical activity and mobility as well as sleep are fair, mood is good. No reported side effects. A current review of the  does not identify any inconsistency. UDS obtained and reviewed; formal confirmation from laboratory is pending. At the present time we will hold off further decrease in her analgesic dosing as she feels she has stabilized temporarily in light of the increased activity with the aqua therapy. Review of Systems   Constitutional: Positive for malaise/fatigue.    Gastrointestinal: Positive for abdominal pain, constipation (using stool softeners and Dulcolax prn) and nausea. Genitourinary: Positive for flank pain. Negative for dysuria, frequency and urgency. Cloudy and dark    Musculoskeletal: Positive for back pain, joint pain and myalgias. Neurological: Positive for sensory change. Psychiatric/Behavioral: The patient has insomnia. All other systems reviewed and are negative. Physical Exam   Constitutional: She is oriented to person, place, and time. She appears well-developed and well-nourished. No distress. HENT:   Head: Normocephalic and atraumatic. Eyes: EOM are normal.   Wearing eyeglasses   Neck: No thyromegaly present. Musculoskeletal:        Right shoulder: She exhibits bony tenderness. Left shoulder: She exhibits bony tenderness. Lumbar back: She exhibits decreased range of motion, tenderness, bony tenderness and pain. Tenderness to palpation of lumbar paraspinal muscles and bilateral SIJs. Neurological: She is alert and oriented to person, place, and time. Gait (antalgic) abnormal.   Psychiatric: She has a normal mood and affect. Her behavior is normal. Thought content normal.   Nursing note and vitals reviewed. ASSESSMENT and PLAN  Encounter Diagnoses   Name Primary?  Lumbar post-laminectomy syndrome Yes    Chronic low back pain without sciatica, unspecified back pain laterality     Degeneration of lumbar intervertebral disc     Chronic midline low back pain with right-sided sciatica     Encounter for long-term (current) use of high-risk medication     Chronic pain syndrome      Treatment plan as noted above. Lubiprostone will be increased to 24 µg twice daily for better control of her opioid-induced constipation. Because the patient's current regimen places him/her at increased risk for possible overdose, a prescription for naloxone nasal spray is being provided.   The patient understands that this medication is only to be used in the setting of a possible overdose and that inadvertent use of this medication could precipitate overt withdrawal.    3 month reassess her    No concerns are raised for misuse, abuse, or diversion. 1. Pain medications are prescribed with the objective of pain relief and improved physical and psychosocial function in this patient. 2. Counseled patient on proper use of prescribed medications and reviewed opioid contract. 3. Counseled patient about chronic medical conditions and their relationship to anxiety and depression and recommended mental health support as needed. 4. Reviewed with patient self-help tools, home exercise, and lifestyle changes to assist the patient in self-management of symptoms. 5. Advised patient to have a primary care provider to continue care for health maintenance and general medical conditions and support for referral to specialty care as needed. 6. Reviewed with patient the treatment plan, goals of treatment plan, and limitations of treatment plan, to include the potential for side effects from medications and procedures. If side effects occur, it is the responsibility of the patient to inform the clinic so that a change in the treatment plan can be made in a safe manner. The patient is advised that stopping prescribed medication may cause an increase in symptoms and possible medication withdrawal symptoms. The patient is informed an emergency room evaluation may be necessary if this occurs. DISPOSITION: The patients condition and plan were discussed at length and all questions were answered. The patient agrees with the plan.     Counseling occupied > 50% of visit:  Total time: 40 minutes

## 2017-05-21 RX ORDER — LEVOTHYROXINE SODIUM 100 UG/1
TABLET ORAL
Qty: 90 TAB | Refills: 2 | Status: SHIPPED | OUTPATIENT
Start: 2017-05-21 | End: 2018-03-02 | Stop reason: SDUPTHER

## 2017-05-24 ENCOUNTER — TELEPHONE (OUTPATIENT)
Dept: PAIN MANAGEMENT | Age: 62
End: 2017-05-24

## 2017-05-24 NOTE — TELEPHONE ENCOUNTER
Pt called requesting lab work be faxed to [email or web address] ([facility]). Have advised that orders would be printed and sent to her so she could take them in for testing. She is in agreement.

## 2017-07-10 ENCOUNTER — OFFICE VISIT (OUTPATIENT)
Dept: PAIN MANAGEMENT | Age: 62
End: 2017-07-10

## 2017-07-10 VITALS
SYSTOLIC BLOOD PRESSURE: 140 MMHG | HEART RATE: 67 BPM | DIASTOLIC BLOOD PRESSURE: 84 MMHG | BODY MASS INDEX: 29.45 KG/M2 | WEIGHT: 161 LBS

## 2017-07-10 DIAGNOSIS — T45.1X5A CHEMOTHERAPY-INDUCED NEUROPATHY (HCC): ICD-10-CM

## 2017-07-10 DIAGNOSIS — M47.816 LUMBAR FACET ARTHROPATHY: ICD-10-CM

## 2017-07-10 DIAGNOSIS — M96.1 POSTLAMINECTOMY SYNDROME, LUMBAR REGION: ICD-10-CM

## 2017-07-10 DIAGNOSIS — M54.41 CHRONIC MIDLINE LOW BACK PAIN WITH RIGHT-SIDED SCIATICA: ICD-10-CM

## 2017-07-10 DIAGNOSIS — K58.2 IRRITABLE BOWEL SYNDROME WITH BOTH CONSTIPATION AND DIARRHEA: ICD-10-CM

## 2017-07-10 DIAGNOSIS — M46.1 BILATERAL SACROILIITIS (HCC): Primary | ICD-10-CM

## 2017-07-10 DIAGNOSIS — G47.421 NARCOLEPSY DUE TO UNDERLYING CONDITION WITH CATAPLEXY: ICD-10-CM

## 2017-07-10 DIAGNOSIS — M54.16 LUMBAR NEURITIS: ICD-10-CM

## 2017-07-10 DIAGNOSIS — M79.7 FIBROMYALGIA: ICD-10-CM

## 2017-07-10 DIAGNOSIS — M62.830 SPASM OF BACK MUSCLES: ICD-10-CM

## 2017-07-10 DIAGNOSIS — G89.29 CHRONIC MIDLINE LOW BACK PAIN WITH RIGHT-SIDED SCIATICA: ICD-10-CM

## 2017-07-10 DIAGNOSIS — K59.03 DRUG-INDUCED CONSTIPATION: ICD-10-CM

## 2017-07-10 DIAGNOSIS — M15.9 GENERALIZED OSTEOARTHRITIS: ICD-10-CM

## 2017-07-10 DIAGNOSIS — G62.0 CHEMOTHERAPY-INDUCED NEUROPATHY (HCC): ICD-10-CM

## 2017-07-10 DIAGNOSIS — M25.519 ARTHRALGIA OF SHOULDER, UNSPECIFIED LATERALITY: ICD-10-CM

## 2017-07-10 DIAGNOSIS — M25.50 PAIN IN JOINT, MULTIPLE SITES: ICD-10-CM

## 2017-07-10 DIAGNOSIS — M48.061 FORAMINAL STENOSIS OF LUMBAR REGION: ICD-10-CM

## 2017-07-10 DIAGNOSIS — M51.37 DEGENERATION OF LUMBAR OR LUMBOSACRAL INTERVERTEBRAL DISC: ICD-10-CM

## 2017-07-10 DIAGNOSIS — M96.1 LUMBAR POST-LAMINECTOMY SYNDROME: ICD-10-CM

## 2017-07-10 RX ORDER — FENTANYL 37.5 UG/H
1 PATCH, EXTENDED RELEASE TRANSDERMAL
Qty: 15 PATCH | Refills: 0 | Status: SHIPPED | OUTPATIENT
Start: 2017-07-10 | End: 2017-08-07 | Stop reason: SDUPTHER

## 2017-07-10 RX ORDER — TRAMADOL HYDROCHLORIDE 50 MG/1
100 TABLET ORAL
Qty: 180 TAB | Refills: 3 | Status: SHIPPED | OUTPATIENT
Start: 2017-07-10 | End: 2017-08-07 | Stop reason: SDUPTHER

## 2017-07-10 NOTE — PATIENT INSTRUCTIONS
Plan:  Continue same medications as prescribed for chronic pain--reduce fentanyl to 37.5 mcg/hr every two days  Movantik 25 mg will be initiated once daily as needed for opioid induced constipation  Follow up in 2 months or sooner if needed  Regular exercise and attention to emotional health and diet remain the most effective ways to treat chronic pain of all kinds  You may contact me with questions or concerns through [employer]

## 2017-07-10 NOTE — PROGRESS NOTES
Nursing Notes    Patient presents to the office today in follow-up. Patient rates her pain at 7/10 on the numerical pain scale. Reviewed medications with counts as follows:    Rx Date filled Qty Dispensed Pill Count Last Dose Short   Tramadol 50 mg 06/13/17 180 161 today no   Fentanyl 50 mcg 06/22/17 15 7 saturday no         Comments: cystitis found at [facility] also had anal rectal motitlity test done  Patient had this done last month. Patient has lab reports in hand  Patient states that sitting for long period of time she has pain in her back    POC UDS was not performed in office today    Any new labs or imaging since last appointment? YES imaging at [address] [place] [address] Dr site in [facility]     Have you been to an emergency room (ER) or urgent care clinic since your last visit? YES       6/23/17 had cysitis      Have you been hospitalized since your last visit? NO     If yes, where, when, and reason for visit? Have you seen or consulted any other health care providers outside of the [address] [place] [address]  since your last visit? YES   Patient saw her back surgeon   If yes, where, when, and reason for visit?     ,Ms. Monserrat Balderas has a reminder for a \"due or due soon\" health maintenance. I have asked that she contact her primary care provider for follow-up on this health maintenance.

## 2017-07-10 NOTE — PROGRESS NOTES
HISTORY OF PRESENT ILLNESS  Edgar Argueta is a 63 y.o. female. Patient presents for follow up of chronic pain due to lumbar postlaminectomy syndrome and polyarthralgias, related to an industrial accident in [address] [facility]. She is also s/p right-sided mastectomy secondary to invasive ductal carcinoma. She is s/p lumbar fusion revision with Dr. Edison Enamorado on 5/3/16. She continues to endorse constant, profound multifocal pain, predominating on the right side of the lower lumbar region and sacroiliac region. She also now endorses similar pain on the left side, particularly since undergoing right sided SIJ injection with Dr. Bhakti Coelho on 6/14/17 (she is not completely sure this is what was performed). She also endorses worsening fatigue, muscle spasms and tenderness, memory problems, bladder pain, headaches, hair loss, irregular bowel habits, back tenderness. We discussed the genetic and centrally-mediated underpinnings of chronic multifocal pain and fibromyalgia. She reports that she met with Dr. Yulissa Ngo (rheum), who believes that her joint pain is neurogenic in origin, but has performed additional autoimmune labs that are still pending. Medications continue to work well for pain control overall. Edgar Argueta is tolerating medications well, with the exception of constipation. She did consult with Dr. Rufus Maria (GI) last month, who instructed her to increase Amitiza to BID, but this produced untenable nausea, she she discontinued this. She has follow up scheduled with him in the near future. She is able to stay more active with less discomfort with these current doses. The patient reports an average of 80% relief with this regimen. Most recent UDS and  were consistent with prescribed medications. Pill counts are appropriate.  She is informed of side effects, risks, and benefits of this regimen, and emphasizes that she derives a significant improvement in functionality and quality of life, and notes that non-opioid medications and therapies in the past have not offered significant benefit. She denies new or worsening insomnia or constipation issues. She denies any falls, injuries, or hospitalizations since the last visit. A total of 45 minutes was spent with the patient of which more than 50% of the time was spent counseling the patient. HPI--see above    ROS  Constitutional: Positive for fever, chills and malaise/fatigue. HENT: Positive for neck pain. Musculoskeletal: Positive for myalgias, back pain and joint pain. Neurological: Positive for headaches. Psychiatric/Behavioral: Negative for depression. The patient is not nervous/anxious and does not have insomnia. Physical Exam  Constitutional: She is oriented to person, place, and time. She appears well-developed and well-nourished. in better spirits  Musculoskeletal:   Marked spasms of cervical paraspinous and trapezius musculature noted        Right shoulder: She exhibits tenderness. Left shoulder: She exhibits tenderness. Lumbar back: She exhibits decreased range of motion, tenderness, pain and spasm. Back:           Arms:  Brisk sacroiliac tenderness noted  Marked spasms of lumbar paraspinous musculature noted  Wearing back brace       Right hand: She exhibits tenderness. Left hand: She exhibits tenderness. Neurological: She is alert and oriented to person, place, and time. No cranial nerve deficit. She exhibits normal muscle tone. Coordination normal.   Psychiatric: She has a normal mood and affect. Her behavior is normal. Judgment and thought content normal.   ASSESSMENT and PLAN    ICD-10-CM ICD-9-CM    1. Bilateral sacroiliitis (HCC) M46.1 720.2 REFERRAL TO PHYSICAL THERAPY   2. Chemotherapy-induced neuropathy (HCC) G62.0 357.6     T45.1X5A E933.1    3. Lumbar neuritis M54.16 724.4    4. Chronic midline low back pain with right-sided sciatica M54.41 724.2 REFERRAL TO PHYSICAL THERAPY    G89.29 724.3      338.29    5.  Postlaminectomy syndrome, lumbar region M96.1 722.83    6. Degeneration of lumbar intervertebral disc M51.37 722.52    7. Pain in joint, multiple sites M25.50 719.49    8. Generalized osteoarthritis M15.9 715.00    9. Arthralgia of shoulder, unspecified laterality M25.519 719.41    10. 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 Foraminal stenosis of lumbar region M99.83 724.02    12. Lumbar facet arthropathy (HCC) M12.88 721.3    13. Lumbar post-laminectomy syndrome M96.1 722.83    14. Spasm of back muscles M62.830 724.8    15. Drug-induced constipation K59.03 564.09      E980.5    16. Irritable bowel syndrome with both constipation and diarrhea K58.2 564.1    17.  Fibromyalgia M79.7 729.1       Plan:  Continue same medications as prescribed for chronic pain--reduce fentanyl to 37.5 mcg/hr every two days  Movantik 25 mg will be initiated once daily as needed for opioid induced constipation  Follow up in 2 months or sooner if needed  Regular exercise and attention to emotional health and diet remain the most effective ways to treat chronic pain of all kinds  You may contact me with questions or concerns through Fanminder

## 2017-07-24 ENCOUNTER — DOCUMENTATION ONLY (OUTPATIENT)
Dept: PAIN MANAGEMENT | Age: 62
End: 2017-07-24

## 2017-07-24 NOTE — PROGRESS NOTES
The pt's referral for an injection and other necessary pt information was faxed over to the pt's  as requested by the pt. It was faxed to [facility] at [phone]. A fax confirmation was received.

## 2017-08-04 ENCOUNTER — OFFICE VISIT (OUTPATIENT)
Dept: FAMILY MEDICINE CLINIC | Age: 62
End: 2017-08-04

## 2017-08-04 VITALS
HEIGHT: 62 IN | TEMPERATURE: 98.2 F | DIASTOLIC BLOOD PRESSURE: 90 MMHG | RESPIRATION RATE: 18 BRPM | HEART RATE: 77 BPM | WEIGHT: 165.2 LBS | BODY MASS INDEX: 30.4 KG/M2 | SYSTOLIC BLOOD PRESSURE: 142 MMHG | OXYGEN SATURATION: 97 %

## 2017-08-04 DIAGNOSIS — J06.9 UPPER RESPIRATORY TRACT INFECTION, UNSPECIFIED TYPE: Primary | ICD-10-CM

## 2017-08-04 RX ORDER — DOXYCYCLINE 100 MG/1
100 TABLET ORAL 2 TIMES DAILY
Qty: 20 TAB | Refills: 0 | Status: SHIPPED | OUTPATIENT
Start: 2017-08-04 | End: 2017-08-14

## 2017-08-04 NOTE — PATIENT INSTRUCTIONS

## 2017-08-04 NOTE — PROGRESS NOTES
Assessment/Plan:    *Diagnoses and all orders for this visit:    1. Upper respiratory tract infection, unspecified type  -     doxycycline (ADOXA) 100 mg tablet; Take 1 Tab by mouth two (2) times a day for 10 days. The plan was discussed with the patient. The patient verbalized understanding and is in agreement with the plan. All medication potential side effects were discussed with the patient.    -------------------------------------------------------------------------------------------------------------------        Francis Sawant is a 63 y.o. female and presents with Generalized Body Aches (sore throat, cough and fatigue)         Subjective:  Has been having an upset stomach, feeling a little constipation. Has been feeling worse, +sore throat, +headaches, +cough, +sweating tremendously. Has been using Tylenol for the headache, does have some relief. Denies fevers. ROS:  Constitutional: No recent weight change. No weakness/fatigue. No f/c. Skin: No rashes, change in nails/hair, itching   HENT: No HA, dizziness. No hearing loss/tinnitus. No nasal congestion/discharge. Eyes: No change in vision, double/blurred vision or eye pain/redness. Cardiovascular: No CP/palpitations. No SIMON/orthopnea/PND. Respiratory: No cough/sputum, dyspnea, wheezing. Gastointestinal: No dysphagia, reflux. No n/v. No constipation/diarrhea. No melena/rectal bleeding. Genitourinary: No dysuria, urinary hesitancy, nocturia, hematuria. No incontinence. Musculoskeletal: No joint pain/stiffness. No muscle pain/tenderness. Endo: No heat/cold intolerance, no polyuria/polydypsia. Heme: No h/o anemia. No easy bleeding/bruising. Allergy/Immunology: No seasonal rhinitis. Denies frequent colds, sinus/ear infections. Neurological: No seizures/numbness/weakness. No paresthesias. Psychiatric:  No depression, anxiety. The problem list was updated as a part of today's visit.   Patient Active Problem List Diagnosis Code    Chronic low back pain M54.5, G89.29    Postlaminectomy syndrome, lumbar region M96.1    Degeneration of lumbar intervertebral disc M51.37    Pain in joint, multiple sites M25.50    Pain in joint, shoulder region M25.519    Generalized osteoarthritis M15.9    History of breast cancer C50.919    Unspecified hypothyroidism E03.9    HLD (hyperlipidemia) E78.5    G6PD (glucose 6 phosphatase deficiency)     Shoulder pain, left M25.512    Recurrent UTI N39.0    Chemotherapy-induced neuropathy (HCC) G62.0, T45.1X5A    Chest wall pain following surgery G89.12    Renal insufficiency N28.9    Proteinuria R80.9    Chronic insomnia F51.04    Paroxysmal sleep G47.419    Encounter for long-term (current) use of high-risk medication Z79.899    Foraminal stenosis of lumbar region M99.83    Lumbar facet arthropathy M12.88    Lumbar post-laminectomy syndrome M96.1    Lumbar neuritis M54.16    Spasm of back muscles M62.830    Drug-induced constipation K59.03    Bilateral sacroiliitis (HCC) M46.1    Drug-induced nausea and vomiting T50.901A, R11.2    Irritable bowel syndrome with both constipation and diarrhea K58.2    Fibromyalgia M79.7    Anxiety F41.9    Depression F32.9    Hypersomnolence G47.10    Chronic pain syndrome G89.4       The PSH, FH were reviewed. SH:  Social History   Substance Use Topics    Smoking status: Never Smoker    Smokeless tobacco: Never Used    Alcohol use No       Medications/Allergies:  Current Outpatient Prescriptions on File Prior to Visit   Medication Sig Dispense Refill    fentaNYL 37.5 mcg/hour pt72 1 Patch by TransDERmal route every fourty-eight (48) hours. Max Daily Amount: 1 Patch. 15 Patch 0    traMADol (ULTRAM) 50 mg tablet Take 2 Tabs by mouth three (3) times daily as needed for Pain.  Max Daily Amount: 300 mg. 180 Tab 3    levothyroxine (SYNTHROID) 100 mcg tablet TAKE 1 TABLET BY MOUTH DAILY (BEFORE BREAKFAST) 90 Tab 2    ondansetron (ZOFRAN ODT) 8 mg disintegrating tablet Take 1 Tab by mouth every eight (8) hours as needed for Nausea. 30 Tab 5    methocarbamol (ROBAXIN) 750 mg tablet TAKE 1 TAB BY MOUTH THREE (3) TIMES DAILY. AS NEEDED FOR MUSCLE SPASMS 90 Tab 5    diclofenac (SOLARAZE) 3 % topical gel Apply 2-4 g to affected area two (2) times a day. As needed for joint pain. Apply to painful areas 100 g 5    lidocaine (XYLOCAINE) 5 % ointment Apply 1-2 g to affected area as needed for Pain. To painful areas 120 g 5    traZODone (DESYREL) 50 mg tablet Take 2 Tabs by mouth nightly. As needed for insomnia 60 Tab 3    acetaminophen (TYLENOL) 500 mg tablet Take  by mouth every six (6) hours as needed for Pain.  ondansetron hcl (ZOFRAN) 8 mg tablet Take 8 mg by mouth.  mometasone (NASONEX) 50 mcg/actuation nasal spray 2 Sprays daily as needed.  ranitidine (ZANTAC) 150 mg tablet Take 150 mg by mouth daily as needed.  loratadine (CLARITIN) 10 mg tablet Take 10 mg by mouth daily as needed. No current facility-administered medications on file prior to visit.          Allergies   Allergen Reactions    Adhesive Rash    Aspirin Other (comments) and Nausea and Vomiting     G6PD  GI BLEED AND ULCER    Contrast Agent [Iodine] Rash     Allergic to CT Dye    Dilantin [Phenytoin Sodium Extended] Other (comments)     Difficulty waking    Iodinated Contrast- Oral And Iv Dye Rash     \"bumps on face\"    Lipitor [Atorvastatin] Other (comments)     PKU     Pcn [Penicillins] Rash and Hives     \"sores all over the body\"    Phenytoin Unknown (comments)     \"Trouble waking up\"    Sulfa (Sulfonamide Antibiotics) Rash and Nausea and Vomiting     G6PD  G6PD    Tegretol [Carbamazepine] Other (comments) and Vertigo     \"Trouble waking up\"  Difficulty waking up         Health Maintenance:   Health Maintenance   Topic Date Due    Pneumococcal 19-64 Highest Risk (1 of 3 - PCV13) 12/23/1976    BREAST CANCER SCRN MAMMOGRAM 10/29/2015    INFLUENZA AGE 9 TO ADULT  08/01/2017    PAP AKA CERVICAL CYTOLOGY  12/13/2019    COLONOSCOPY  03/15/2023    DTaP/Tdap/Td series (2 - Td) 04/01/2025    Hepatitis C Screening  Completed       Objective:  Visit Vitals    /90    Pulse 77    Temp 98.2 °F (36.8 °C)    Resp 18    Ht 5' 2\" (1.575 m)    Wt 165 lb 3.2 oz (74.9 kg)    LMP 08/28/2002    SpO2 97%    BMI 30.22 kg/m2          Nurses notes and VS reviewed. Physical Examination: General appearance - tired  Ears - bilateral TM's and external ear canals normal  Mouth - erythematous  Neck - supple, no significant adenopathy  Chest - clear to auscultation, no wheezes, rales or rhonchi, symmetric air entry  Heart - normal rate, regular rhythm, normal S1, S2, no murmurs, rubs, clicks or gallops        Labwork and Ancillary Studies:    CBC w/Diff  Lab Results   Component Value Date/Time    WBC 5.8 10/06/2016 04:02 PM    HGB 12.2 10/06/2016 04:02 PM    PLATELET 499 45/97/7151 04:02 PM         Basic Metabolic Profile  Lab Results   Component Value Date/Time    Sodium 139 10/06/2016 04:02 PM    Potassium 4.4 10/06/2016 04:02 PM    Chloride 98 10/06/2016 04:02 PM    CO2 27 10/06/2016 04:02 PM    Anion gap 8 11/24/2015 10:47 AM    Glucose 94 10/06/2016 04:02 PM    BUN 9 10/06/2016 04:02 PM    Creatinine 1.02 10/06/2016 04:02 PM    BUN/Creatinine ratio 9 10/06/2016 04:02 PM    GFR est AA 70 10/06/2016 04:02 PM    GFR est non-AA 61 10/06/2016 04:02 PM    Calcium 9.4 10/06/2016 04:02 PM         LFT  Lab Results   Component Value Date/Time    ALT (SGPT) 20 11/24/2015 10:47 AM    AST (SGOT) 15 11/24/2015 10:47 AM    Alk.  phosphatase 78 11/24/2015 10:47 AM    Bilirubin, direct 0.1 11/24/2015 10:47 AM    Bilirubin, total 0.4 11/24/2015 10:47 AM         Cholesterol  Lab Results   Component Value Date/Time    Cholesterol, total 278 11/24/2015 10:47 AM    HDL Cholesterol 47 11/24/2015 10:47 AM    LDL, calculated 209.8 11/24/2015 10:47 AM Triglyceride 106 11/24/2015 10:47 AM    CHOL/HDL Ratio 5.9 11/24/2015 10:47 AM

## 2017-08-04 NOTE — PROGRESS NOTES
Chief Complaint   Patient presents with    Generalized Body Aches     sore throat, cough and fatigue           1. Have you been to the ER, urgent care clinic since your last visit? Hospitalized since your last visit? No    2. Have you seen or consulted any other health care providers outside of the [address] [place] [address] since your last visit? Include any pap smears or colon screening.  Yes Where: Pain Management

## 2017-08-04 NOTE — MR AVS SNAPSHOT
Visit Information Date & Time Provider Department Dept. Phone Encounter #  
 8/4/2017  9:30 AM Linda Kamara, [place] [facility] [phone] [id number] Your Appointments 8/7/2017  1:40 PM  
Follow Up with Brinda Kayser, PA [address] for Pain Management ([facility] SCHEDULING) Appt Note: return in 1 month [address] [facility] [address]  
[phone] [id number] 9/12/2017  3:00 PM  
Follow Up with Danielle Otoole PA-C [address] for Pain Management ([facility] SCHEDULING) Appt Note: return in 2 months [address] [facility] [address]  
[phone] [id number] Upcoming Health Maintenance Date Due Pneumococcal 19-64 Highest Risk (1 of 3 - PCV13) 12/23/1976 BREAST CANCER SCRN MAMMOGRAM 10/29/2015 INFLUENZA AGE 9 TO ADULT 8/1/2017 PAP AKA CERVICAL CYTOLOGY 12/13/2019 COLONOSCOPY 3/15/2023 DTaP/Tdap/Td series (2 - Td) 4/1/2025 Allergies as of 8/4/2017  Review Complete On: 8/4/2017 By: Linda Kamara MD  
  
 Severity Noted Reaction Type Reactions Adhesive    Rash Aspirin  06/20/2011    Other (comments), Nausea and Vomiting G6PD 
GI BLEED AND ULCER Contrast Agent [Iodine]    Rash Allergic to CT Dye  
 Dilantin [Phenytoin Sodium Extended]    Other (comments) Difficulty waking Iodinated Contrast- Oral And Iv Dye  05/03/2016    Rash \"bumps on face\" Lipitor [Atorvastatin]  12/13/2016    Other (comments) PKU Pcn [Penicillins]    Rash, Hives \"sores all over the body\" Phenytoin  05/03/2016    Unknown (comments) \"Trouble waking up\" Sulfa (Sulfonamide Antibiotics)    Rash, Nausea and Vomiting G6PD 
G6PD Tegretol [Carbamazepine]    Other (comments), Vertigo \"Trouble waking up\" Difficulty waking up Current Immunizations  Reviewed on 11/25/2015 Name Date Influenza Vaccine 11/25/2015  7:48 AM  
 Tdap 4/1/2015 Not reviewed this visit You Were Diagnosed With   
  
 Codes Comments Upper respiratory tract infection, unspecified type    -  Primary ICD-10-CM: J06.9 ICD-9-CM: 465.9 Vitals BP Pulse Temp Resp Height(growth percentile) Weight(growth percentile) 142/90 77 98.2 °F (36.8 °C) 18 5' 2\" (1.575 m) 165 lb 3.2 oz (74.9 kg) LMP SpO2 BMI OB Status Smoking Status 08/28/2002 97% 30.22 kg/m2 Postmenopausal Never Smoker Vitals History BMI and BSA Data Body Mass Index Body Surface Area  
 30.22 kg/m 2 1.81 m 2 Preferred Pharmacy Pharmacy Name Phone [facility] Aga stevens Graciela [phone] Your Updated Medication List  
  
   
This list is accurate as of: 8/4/17 10:11 AM.  Always use your most recent med list.  
  
  
  
  
 acetaminophen 500 mg tablet Commonly known as:  TYLENOL Take  by mouth every six (6) hours as needed for Pain. CLARITIN 10 mg tablet Generic drug:  loratadine Take 10 mg by mouth daily as needed. diclofenac 3 % topical gel Commonly known as:  Belynda Mcburney Apply 2-4 g to affected area two (2) times a day. As needed for joint pain. Apply to painful areas  
  
 doxycycline 100 mg tablet Commonly known as:  ADOXA Take 1 Tab by mouth two (2) times a day for 10 days. fentaNYL 37.5 mcg/hour Pt72  
1 Patch by TransDERmal route every fourty-eight (48) hours. Max Daily Amount: 1 Patch.  
  
 levothyroxine 100 mcg tablet Commonly known as:  SYNTHROID  
TAKE 1 TABLET BY MOUTH DAILY (BEFORE BREAKFAST) lidocaine 5 % ointment Commonly known as:  XYLOCAINE Apply 1-2 g to affected area as needed for Pain. To painful areas  
  
 methocarbamol 750 mg tablet Commonly known as:  ROBAXIN  
TAKE 1 TAB BY MOUTH THREE (3) TIMES DAILY. AS NEEDED FOR MUSCLE SPASMS  
  
 NASONEX 50 mcg/actuation nasal spray Generic drug:  mometasone 2 Sprays daily as needed. ondansetron 8 mg disintegrating tablet Commonly known as:  ZOFRAN ODT Take 1 Tab by mouth every eight (8) hours as needed for Nausea. ondansetron hcl 8 mg tablet Commonly known as:  [place] Appl Take 8 mg by mouth. traMADol 50 mg tablet Commonly known as:  ULTRAM  
Take 2 Tabs by mouth three (3) times daily as needed for Pain. Max Daily Amount: 300 mg.  
  
 traZODone 50 mg tablet Commonly known as:  Angeline Yves Take 2 Tabs by mouth nightly. As needed for insomnia ZANTAC 150 mg tablet Generic drug:  raNITIdine Take 150 mg by mouth daily as needed. Prescriptions Sent to Pharmacy Refills  
 doxycycline (ADOXA) 100 mg tablet 0 Sig: Take 1 Tab by mouth two (2) times a day for 10 days. Class: Normal  
 Pharmacy: [address] [place] [address] #: [phone] Route: Oral  
  
Patient Instructions Upper Respiratory Infection (Cold): Care Instructions Your Care Instructions An upper respiratory infection, or URI, is an infection of the nose, sinuses, or throat. URIs are spread by coughs, sneezes, and direct contact. The common cold is the most frequent kind of URI. The flu and sinus infections are other kinds of URIs. Almost all URIs are caused by viruses. Antibiotics won't cure them. But you can treat most infections with home care. This may include drinking lots of fluids and taking over-the-counter pain medicine. You will probably feel better in 4 to 10 days. The doctor has checked you carefully, but problems can develop later. If you notice any problems or new symptoms, get medical treatment right away. Follow-up care is a key part of your treatment and safety. Be sure to make and go to all appointments, and call your doctor if you are having problems. It's also a good idea to know your test results and keep a list of the medicines you take. How can you care for yourself at home? · To prevent dehydration, drink plenty of fluids, enough so that your urine is light yellow or clear like water. Choose water and other caffeine-free clear liquids until you feel better. If you have kidney, heart, or liver disease and have to limit fluids, talk with your doctor before you increase the amount of fluids you drink. · Take an over-the-counter pain medicine, such as acetaminophen (Tylenol), ibuprofen (Advil, Motrin), or naproxen (Aleve). Read and follow all instructions on the label. · Before you use cough and cold medicines, check the label. These medicines may not be safe for young children or for people with certain health problems. · Be careful when taking over-the-counter cold or flu medicines and Tylenol at the same time. Many of these medicines have acetaminophen, which is Tylenol. Read the labels to make sure that you are not taking more than the recommended dose. Too much acetaminophen (Tylenol) can be harmful. · Get plenty of rest. 
· Do not smoke or allow others to smoke around you. If you need help quitting, talk to your doctor about stop-smoking programs and medicines. These can increase your chances of quitting for good. When should you call for help? Call 911 anytime you think you may need emergency care. For example, call if: 
· You have severe trouble breathing. Call your doctor now or seek immediate medical care if: 
· You seem to be getting much sicker. · You have new or worse trouble breathing. · You have a new or higher fever. · You have a new rash. Watch closely for changes in your health, and be sure to contact your doctor if: 
· You have a new symptom, such as a sore throat, an earache, or sinus pain. · You cough more deeply or more often, especially if you notice more mucus or a change in the color of your mucus. · You do not get better as expected. Where can you learn more? Go to http://celia-staci.info/. Enter O018 in the search box to learn more about \"Upper Respiratory Infection (Cold): Care Instructions. \" Current as of: March 25, 2017 Content Version: 11.3 © 3757-1492 [employer]. Care instructions adapted under license by [employer] (which disclaims liability or warranty for this information). If you have questions about a medical condition or this instruction, always ask your healthcare professional. [place] [address] any warranty or liability for your use of this information. Introducing [facility]! [employer] introduces [employer] patient portal. Now you can access parts of your medical record, email your doctor's office, and request medication refills online. 1. In your internet browser, go to [email or web address]. [email or web address] 2. Click on the First Time User? Click Here link in the Sign In box. You will see the New Member Sign Up page. 3. Enter your [employer] Access Code exactly as it appears below. You will not need to use this code after youve completed the sign-up process. If you do not sign up before the expiration date, you must request a new code. · [employer] Access Code: [email or web address] Expires: 8/10/2017  1:54 PM 
 
4. Enter the last four digits of your Social Security Number (xxxx) and Date of Birth (mm/dd/yyyy) as indicated and click Submit. You will be taken to the next sign-up page. 5. Create a [employer] ID. This will be your [employer] login ID and cannot be changed, so think of one that is secure and easy to remember. 6. Create a [employer] password. You can change your password at any time. 7. Enter your Password Reset Question and Answer. This can be used at a later time if you forget your password. 8. Enter your e-mail address. You will receive e-mail notification when new information is available in [address] 9. Click Sign Up. You can now view and download portions of your medical record. 10. Click the Download Summary menu link to download a portable copy of your medical information. If you have questions, please visit the Frequently Asked Questions section of the [employer] website. Remember, [employer] is NOT to be used for urgent needs. For medical emergencies, dial 911. Now available from your iPhone and Android! Please provide this summary of care documentation to your next provider. Your primary care clinician is listed as Mahesh Mosquera. If you have any questions after today's visit, please call [phone].

## 2017-08-07 ENCOUNTER — OFFICE VISIT (OUTPATIENT)
Dept: PAIN MANAGEMENT | Age: 62
End: 2017-08-07

## 2017-08-07 VITALS
RESPIRATION RATE: 20 BRPM | DIASTOLIC BLOOD PRESSURE: 89 MMHG | WEIGHT: 165 LBS | BODY MASS INDEX: 30.18 KG/M2 | HEART RATE: 78 BPM | TEMPERATURE: 99.1 F | SYSTOLIC BLOOD PRESSURE: 138 MMHG

## 2017-08-07 DIAGNOSIS — M25.50 PAIN IN JOINT, MULTIPLE SITES: ICD-10-CM

## 2017-08-07 DIAGNOSIS — G89.4 CHRONIC PAIN SYNDROME: ICD-10-CM

## 2017-08-07 DIAGNOSIS — G89.29 CHRONIC MIDLINE LOW BACK PAIN WITH RIGHT-SIDED SCIATICA: ICD-10-CM

## 2017-08-07 DIAGNOSIS — M15.9 GENERALIZED OSTEOARTHRITIS: ICD-10-CM

## 2017-08-07 DIAGNOSIS — M96.1 POSTLAMINECTOMY SYNDROME, LUMBAR REGION: ICD-10-CM

## 2017-08-07 DIAGNOSIS — M79.7 FIBROMYALGIA: ICD-10-CM

## 2017-08-07 DIAGNOSIS — M51.37 DEGENERATION OF LUMBAR OR LUMBOSACRAL INTERVERTEBRAL DISC: ICD-10-CM

## 2017-08-07 DIAGNOSIS — M54.41 CHRONIC MIDLINE LOW BACK PAIN WITH RIGHT-SIDED SCIATICA: ICD-10-CM

## 2017-08-07 RX ORDER — TRAMADOL HYDROCHLORIDE 50 MG/1
100 TABLET ORAL
Qty: 180 TAB | Refills: 3 | Status: SHIPPED | OUTPATIENT
Start: 2017-08-13 | End: 2017-12-06 | Stop reason: SDUPTHER

## 2017-08-07 RX ORDER — FENTANYL 37.5 UG/H
1 PATCH, EXTENDED RELEASE TRANSDERMAL
Qty: 15 PATCH | Refills: 0 | Status: SHIPPED | OUTPATIENT
Start: 2017-08-13 | End: 2017-09-12 | Stop reason: SDUPTHER

## 2017-08-07 NOTE — PROGRESS NOTES
Nursing Notes    Patient presents to the office today in follow-up. Patient rates her pain at 7/10 on the numerical pain scale. Reviewed medications with counts as follows:    Rx Date filled Qty Dispensed Pill Count Last Dose Short   Fentanyl 37.5mcg 07/14/17 15 3 08/06/17 No    Tramadol 50mg 07/24/17 180 135 This am  No                                     Comments:     POC UDS was not performed in office today    Any new labs or imaging since last appointment? NO    Have you been to an emergency room (ER) or urgent care clinic since your last visit? NO            Have you been hospitalized since your last visit? NO     If yes, where, when, and reason for visit? Have you seen or consulted any other health care providers outside of the [address] [place] [address]  since your last visit? YES     If yes, where, when, and reason for visit? pcp       HM deferred to pcp.

## 2017-08-07 NOTE — PATIENT INSTRUCTIONS
1. Continue current plan with no evidence of addiction or diversion. Stable on current medication without adverse events. 2. Continue methocarbamol 750 mg up to 3 times daily as needed  3. Continue trazodone 50 mg. Take 1 tablet nightly as needed for sleep. 4. Refill fentanyl 37.5 mcg patch every 48 hours. 5. Continue diclofenac gel 3% 3-4 times daily  6. Refill tramadol 1-2 tablets up to 3 times daily as needed. 7. Ultram 50 mg 2 tablets q 6 - 8 hours as needed. 8. Recommend continuing physical therapy  9. Discussed risks of addiction, dependency, and opioid induced hyperalgesia.    10. Return to clinic in 1 month with Joseluis Reyes

## 2017-08-07 NOTE — MR AVS SNAPSHOT
Visit Information Date & Time Provider Department Dept. Phone Encounter #  
 8/7/2017  1:40 PM Razia Mc, [facility] [phone] [id number] Follow-up Instructions Return in about 1 month (around 9/7/2017). Your Appointments 9/12/2017  3:00 PM  
Follow Up with Delmis Andrews PA-C [facility] ([facility] SCHEDULING) Appt Note: return in 2 months [address] [facility] [address]  
[phone] [id number]  
  
    
 9/21/2017  3:00 PM  
New Patient with Ernestinarafael Vencesn, PT [facility] [address] ([facility] Appt Note: Wkmen Comp npppw mailed, referral from Dr Timi Santiago, returned call//afj  
 [address] [facility] [phone]  
  
    
 9/28/2017  2:00 PM  
PHYSICAL THERAPY with Ernestina Vencesmarina, PT [facility] [address] ([facility]) Appt Note: Wkmen comp f/u per referral Dr Slade Dee  
 [address] [facility] [address]  
  
   
 [phone]  
  
    
 10/9/2017  2:00 PM  
Follow Up with Delmis Andrews PA-C [facility] ([facility] SCHEDULING) Appt Note: Return in about 2 months (around 10/7/2017). And Also  [address] [facility] [address]  
[phone] Upcoming Health Maintenance Date Due Pneumococcal 19-64 Highest Risk (1 of 3 - PCV13) 12/23/1976 BREAST CANCER SCRN MAMMOGRAM 10/29/2015 INFLUENZA AGE 9 TO ADULT 8/1/2017 PAP AKA CERVICAL CYTOLOGY 12/13/2019 COLONOSCOPY 3/15/2023 DTaP/Tdap/Td series (2 - Td) 4/1/2025 Allergies as of 8/7/2017  Review Complete On: 8/7/2017 By: CORY George Severity Noted Reaction Type Reactions Adhesive    Rash Aspirin  06/20/2011    Other (comments), Nausea and Vomiting G6PD 
GI BLEED AND ULCER Contrast Agent [Iodine]    Rash Allergic to CT Dye  
 Dilantin [Phenytoin Sodium Extended]    Other (comments) Difficulty waking Iodinated Contrast- Oral And Iv Dye  05/03/2016    Rash \"bumps on face\" Lipitor [Atorvastatin]  12/13/2016    Other (comments) PKU Pcn [Penicillins]    Rash, Hives \"sores all over the body\" Phenytoin  05/03/2016    Unknown (comments) \"Trouble waking up\" Sulfa (Sulfonamide Antibiotics)    Rash, Nausea and Vomiting G6PD 
G6PD Tegretol [Carbamazepine]    Other (comments), Vertigo \"Trouble waking up\" Difficulty waking up Current Immunizations  Reviewed on 11/25/2015 Name Date Influenza Vaccine 11/25/2015  7:48 AM  
 Tdap 4/1/2015 Not reviewed this visit You Were Diagnosed With   
  
 Codes Comments Chronic midline low back pain with right-sided sciatica     ICD-10-CM: M54.41, G89.29 ICD-9-CM: 724.2, 724.3, 338.29 Postlaminectomy syndrome, lumbar region     ICD-10-CM: M96.1 ICD-9-CM: 722.83 Degeneration of lumbar or lumbosacral intervertebral disc     ICD-10-CM: M51.37 
ICD-9-CM: 722.52 Pain in joint, multiple sites     ICD-10-CM: M25.50 ICD-9-CM: 719.49 Generalized osteoarthritis     ICD-10-CM: M15.9 ICD-9-CM: 715.00 Fibromyalgia     ICD-10-CM: M79.7 ICD-9-CM: 729.1 Chronic pain syndrome     ICD-10-CM: G89.4 ICD-9-CM: 338. 4 Vitals BP Pulse Temp Resp Weight(growth percentile) LMP  
 138/89 (BP 1 Location: Left arm, BP Patient Position: Sitting) 78 99.1 °F (37.3 °C) (Oral) 20 165 lb (74.8 kg) 08/28/2002 BMI OB Status Smoking Status 30.18 kg/m2 Postmenopausal Never Smoker Vitals History BMI and BSA Data Body Mass Index Body Surface Area  
 30.18 kg/m 2 1.81 m 2 Preferred Pharmacy Pharmacy Name Phone [facility] [place] [address] [phone] Your Updated Medication List  
  
   
This list is accurate as of: 8/7/17  2:22 PM.  Always use your most recent med list.  
  
  
  
  
 acetaminophen 500 mg tablet Commonly known as:  TYLENOL Take  by mouth every six (6) hours as needed for Pain. CLARITIN 10 mg tablet Generic drug:  loratadine Take 10 mg by mouth daily as needed. diclofenac 3 % topical gel Commonly known as:  Mirtha Hamilton Apply 2-4 g to affected area two (2) times a day. As needed for joint pain. Apply to painful areas  
  
 doxycycline 100 mg tablet Commonly known as:  ADOXA Take 1 Tab by mouth two (2) times a day for 10 days. fentaNYL 37.5 mcg/hour Pt72  
1 Patch by TransDERmal route every fourty-eight (48) hours. Max Daily Amount: 1 Patch. Start taking on:  8/13/2017  
  
 levothyroxine 100 mcg tablet Commonly known as:  SYNTHROID  
TAKE 1 TABLET BY MOUTH DAILY (BEFORE BREAKFAST) lidocaine 5 % ointment Commonly known as:  XYLOCAINE Apply 1-2 g to affected area as needed for Pain. To painful areas  
  
 methocarbamol 750 mg tablet Commonly known as:  ROBAXIN  
TAKE 1 TAB BY MOUTH THREE (3) TIMES DAILY. AS NEEDED FOR MUSCLE SPASMS  
  
 NASONEX 50 mcg/actuation nasal spray Generic drug:  mometasone 2 Sprays daily as needed. ondansetron 8 mg disintegrating tablet Commonly known as:  ZOFRAN ODT Take 1 Tab by mouth every eight (8) hours as needed for Nausea. ondansetron hcl 8 mg tablet Commonly known as:  Ranny Kanaris Take 8 mg by mouth. traMADol 50 mg tablet Commonly known as:  ULTRAM  
Take 2 Tabs by mouth three (3) times daily as needed for Pain. Max Daily Amount: 300 mg. Start taking on:  8/13/2017  
  
 traZODone 50 mg tablet Commonly known as:  Rayma Medal Take 2 Tabs by mouth nightly. As needed for insomnia ZANTAC 150 mg tablet Generic drug:  raNITIdine Take 150 mg by mouth daily as needed. Prescriptions Printed Refills  
 fentaNYL 37.5 mcg/hour pt72 0 Starting on: 2017 Si Patch by TransDERmal route every fourty-eight (48) hours. Max Daily Amount: 1 Patch. Class: Print Route: TransDERmal  
 traMADol (ULTRAM) 50 mg tablet 3 Starting on: 2017 Sig: Take 2 Tabs by mouth three (3) times daily as needed for Pain. Max Daily Amount: 300 mg. Class: Print Route: Oral  
  
Follow-up Instructions Return in about 1 month (around 2017). Patient Instructions 1. Continue current plan with no evidence of addiction or diversion. Stable on current medication without adverse events. 2. Continue methocarbamol 750 mg up to 3 times daily as needed 3. Continue trazodone 50 mg. Take 1 tablet nightly as needed for sleep. 4. Refill fentanyl 37.5 mcg patch every 48 hours. 5. Continue diclofenac gel 3% 34 times daily 6. Refill tramadol 12 tablets up to 3 times daily as needed. 7. Ultram 50 mg 2 tablets q 6 - 8 hours as needed. 8. Recommend continuing physical therapy 9. Discussed risks of addiction, dependency, and opioid induced hyperalgesia. 10. Return to clinic in 1 month with Gabrielle Grissom Introducing [facility]! [employer] introduces [employer] patient portal. Now you can access parts of your medical record, email your doctor's office, and request medication refills online. 1. In your internet browser, go to [email or web address] 2. Click on the First Time User? Click Here link in the Sign In box. You will see the New Member Sign Up page. 3. Enter your [employer] Access Code exactly as it appears below. You will not need to use this code after youve completed the sign-up process. If you do not sign up before the expiration date, you must request a new code. · [employer] Access Code: [email or web address] Expires: 8/10/2017  1:54 PM 
 
 4. Enter the last four digits of your Social Security Number (xxxx) and Date of Birth (mm/dd/yyyy) as indicated and click Submit. You will be taken to the next sign-up page. 5. Create a Exam18 ID. This will be your Exam18 login ID and cannot be changed, so think of one that is secure and easy to remember. 6. Create a Exam18 password. You can change your password at any time. 7. Enter your Password Reset Question and Answer. This can be used at a later time if you forget your password. 8. Enter your e-mail address. You will receive e-mail notification when new information is available in [address] 9. Click Sign Up. You can now view and download portions of your medical record. 10. Click the Download Summary menu link to download a portable copy of your medical information. If you have questions, please visit the Frequently Asked Questions section of the Exam[employer] website. Remember, Ex[employer] is NOT to be used for urgent needs. For medical emergencies, dial 911. Now available from your iPhone and Android! Please provide this summary of care documentation to your next provider. Your primary care clinician is listed as Mahesh 51. If you have any questions after today's visit, please call [phone].

## 2017-08-07 NOTE — PROGRESS NOTES
HISTORY OF PRESENT ILLNESS  Roxann Ruth is a 63 y.o. female    HPI: Ms. Wilton Saha  returns today for f/u of chronic pain due to lumbar postlaminectomy syndrome and polyarthralgias, related to an industrial accident in 12. H/o 3 lumbar surgeries, the last one was a lumbar fusion revision with Dr. Kavon Barton on 5/3/16. Today is my first visit with Ms. Wilton Saha. She is here today with her . She reports some mild worsening pain since her last visit. He says that her insurance has been trying to get her medications as low as possible. Her fentanyl was reduced from 50 mcg patch down to 37.5 mcg patch last visit. She thinks that this may have been too low for her as she has not been doing as well as she was prior to the adjustment. She has been going to aqua therapy which has been effective. There is a lapse in her therapy at this time. I have strongly encouraged her to get back into aqua therapy. I have also recommended that we continue with her 37.5 mcg patch for now. She will continue to use her tramadol on an as-needed basis only. We will discuss adjusting her fentanyl patch again after she is back in aqua therapy. She is currently recovering from a URI. She is on antibiotic and will continue to follow-up with her PCP. Medications are helping with pain control and quality of life. Her pain is 4-7/10 with medication and 10/10 without. Pt describes pain as constant aching, burning, and crushing (in toes) and stabbing. Aggravating factors include most \"upright positions\" including sitting, standing, and walking. Relieved with rest, medication, and avoiding painful activities. Current treatment is helping to improve general activity, mood, walking, sleep, enjoyment of life    In the past 30 days, the patient reports approximately 50% pain relief with current treatment/medications. Pt does report constipation not well controlled. She previously discontinued Amitiza due to side effects.   She is following up with her gastroenterologist who has recommended milk of magnesia. She will continue to follow-up with her gastroenterologist for further recommendation. She  is otherwise doing well with no other complaints today. She denies any adverse events including nausea, vomiting, dizziness, increased constipation, hallucinations, or seizures. Because the patient's current regimen places him/her at increased risk for possible overdose, a prescription for naloxone nasal spray has been provided. The patient understands that this medication is only to be used in the setting of a possible overdose and that inadvertent use of this medication could precipitate overt withdrawal.       Allergies   Allergen Reactions    Adhesive Rash    Aspirin Other (comments) and Nausea and Vomiting     G6PD  GI BLEED AND ULCER    Contrast Agent [Iodine] Rash     Allergic to CT Dye    Dilantin [Phenytoin Sodium Extended] Other (comments)     Difficulty waking    Iodinated Contrast- Oral And Iv Dye Rash     \"bumps on face\"    Lipitor [Atorvastatin] Other (comments)     PKU     Pcn [Penicillins] Rash and Hives     \"sores all over the body\"    Phenytoin Unknown (comments)     \"Trouble waking up\"    Sulfa (Sulfonamide Antibiotics) Rash and Nausea and Vomiting     G6PD  G6PD    Tegretol [Carbamazepine] Other (comments) and Vertigo     \"Trouble waking up\"  Difficulty waking up       Past Surgical History:   Procedure Laterality Date    HX BREAST RECONSTRUCTION  2012    HX BREAST RECONSTRUCTION      HX CARPAL TUNNEL RELEASE      HX MASTECTOMY  2012    right side    HX MASTECTOMY Right     HX MOHS PROCEDURES      HX ORTHOPAEDIC      rotator cuff repair on left- Dr. Any Ivy HX ORTHOPAEDIC  05/2016    decompression, spinal fusion    HX OTHER SURGICAL      spinal chord stimulator inssertion/ did not stay in         Review of Systems   Constitutional: Positive for malaise/fatigue.  Negative for chills, fever and weight loss.   HENT: Positive for sore throat. Negative for congestion. Eyes: Negative for blurred vision and double vision. Respiratory: Positive for cough, shortness of breath and wheezing. Current URI, on antibiotics   Cardiovascular: Negative for chest pain and palpitations. Gastrointestinal: Positive for constipation ( treated by gastroenterologist ). Negative for abdominal pain, diarrhea, heartburn, nausea and vomiting. Genitourinary: Negative for dysuria, frequency and urgency. Musculoskeletal: Positive for back pain, joint pain and myalgias. Negative for neck pain. Neurological: Positive for headaches. Negative for dizziness, seizures and loss of consciousness. Endo/Heme/Allergies: Does not bruise/bleed easily. Psychiatric/Behavioral: Negative for depression. The patient has insomnia. The patient is not nervous/anxious. All other systems reviewed and are negative. Physical Exam   Constitutional: She is oriented to person, place, and time and well-developed, well-nourished, and in no distress. No distress. HENT:   Head: Normocephalic and atraumatic. Eyes: EOM are normal.   Pulmonary/Chest: Effort normal.   Musculoskeletal:        Lumbar back: She exhibits decreased range of motion, tenderness and pain. Neurological: She is alert and oriented to person, place, and time. Gait ( using a walker) abnormal.   Skin: Skin is warm and dry. No rash noted. She is not diaphoretic. No erythema. Psychiatric: Mood, memory, affect and judgment normal.   Nursing note and vitals reviewed. ASSESSMENT:    1. Chronic midline low back pain with right-sided sciatica    2. Postlaminectomy syndrome, lumbar region    3. Degeneration of lumbar intervertebral disc    4. Pain in joint, multiple sites    5. Generalized osteoarthritis    6. Fibromyalgia    7.  Chronic pain syndrome           [place] Prescription Monitoring Program was reviewed which does not demonstrate aberrancies and/or inconsistencies with regard to the historical prescribing of controlled medications to this patient by other providers. PLAN / Pt Instructions:  1. Continue current plan with no evidence of addiction or diversion. Stable on current medication without adverse events. 2. Continue methocarbamol 750 mg up to 3 times daily as needed  3. Continue trazodone 50 mg. Take 1 tablet nightly as needed for sleep. 4. Refill fentanyl 37.5 mcg patch every 48 hours. 5. Continue diclofenac gel 3% 3-4 times daily  6. Refill tramadol 1-2 tablets up to 3 times daily as needed. 7. Ultram 50 mg 2 tablets q 6 - 8 hours as needed. 8. Recommend continuing physical therapy  9. Naloxone 4 mg nasal spray for opioid induced respiratory depression emergency only. 10. Discussed risks of addiction, dependency, and opioid induced hyperalgesia. 11. Return to clinic in 1 month with Juliana JOHNS    Medications Ordered Today   Medications    fentaNYL 37.5 mcg/hour pt72     Si Patch by TransDERmal route every fourty-eight (48) hours. Max Daily Amount: 1 Patch. Dispense:  15 Patch     Refill:  0    traMADol (ULTRAM) 50 mg tablet     Sig: Take 2 Tabs by mouth three (3) times daily as needed for Pain. Max Daily Amount: 300 mg. Dispense:  180 Tab     Refill:  3       DISPOSITION   Pain medications are prescribed with the objective of pain relief and improved physical and psychosocial function in this patient.  Counseled patient on proper use of prescribed medications.  Counseled patient about chronic medical conditions and their relationship to anxiety and depression and recommended mental health support as needed.  Reviewed with patient self-help tools, home exercise, and lifestyle changes to assist the patient in self-management of symptoms.  Reviewed with patient the treatment plan, goals of treatment plan, and limitations of treatment plan, to include the potential for side effects from medications and procedures.   If side effects occur, it is the responsibility of the patient to inform the clinic so that a change in the treatment plan can be made in a safe manner. The patient is advised that stopping prescribed medication may cause an increase in symptoms and possible medication withdrawal symptoms. The patient is informed an emergency room evaluation may be necessary if this occurs. Spent 25 minutes with patient today which more than 50% of that time was spent on counseling and coordination of care. Juany Ocasio 8/7/2017        Note: Please excuse any typographical errors. Voice recognition software was used for this note and may cause mistakes.

## 2017-08-31 RX ORDER — TRAZODONE HYDROCHLORIDE 50 MG/1
TABLET ORAL
Qty: 60 TAB | Refills: 3 | Status: SHIPPED | OUTPATIENT
Start: 2017-08-31 | End: 2018-10-19

## 2017-09-12 ENCOUNTER — OFFICE VISIT (OUTPATIENT)
Dept: PAIN MANAGEMENT | Age: 62
End: 2017-09-12

## 2017-09-12 VITALS
WEIGHT: 165 LBS | HEART RATE: 68 BPM | BODY MASS INDEX: 30.18 KG/M2 | DIASTOLIC BLOOD PRESSURE: 92 MMHG | SYSTOLIC BLOOD PRESSURE: 169 MMHG | TEMPERATURE: 99 F

## 2017-09-12 DIAGNOSIS — M96.1 LUMBAR POSTLAMINECTOMY SYNDROME: Primary | ICD-10-CM

## 2017-09-12 DIAGNOSIS — M48.061 FORAMINAL STENOSIS OF LUMBAR REGION: ICD-10-CM

## 2017-09-12 DIAGNOSIS — M70.62 TROCHANTERIC BURSITIS OF BOTH HIPS: ICD-10-CM

## 2017-09-12 DIAGNOSIS — K59.03 THERAPEUTIC OPIOID INDUCED CONSTIPATION: ICD-10-CM

## 2017-09-12 DIAGNOSIS — M70.61 TROCHANTERIC BURSITIS OF BOTH HIPS: ICD-10-CM

## 2017-09-12 DIAGNOSIS — M79.7 FIBROMYALGIA: ICD-10-CM

## 2017-09-12 DIAGNOSIS — Z79.899 ENCOUNTER FOR LONG-TERM (CURRENT) USE OF HIGH-RISK MEDICATION: ICD-10-CM

## 2017-09-12 DIAGNOSIS — M25.50 PAIN IN JOINT, MULTIPLE SITES: ICD-10-CM

## 2017-09-12 DIAGNOSIS — M96.1 POSTLAMINECTOMY SYNDROME, LUMBAR REGION: ICD-10-CM

## 2017-09-12 DIAGNOSIS — T40.2X5A THERAPEUTIC OPIOID INDUCED CONSTIPATION: ICD-10-CM

## 2017-09-12 DIAGNOSIS — M25.519 ARTHRALGIA OF SHOULDER, UNSPECIFIED LATERALITY: ICD-10-CM

## 2017-09-12 DIAGNOSIS — M15.9 GENERALIZED OSTEOARTHRITIS: ICD-10-CM

## 2017-09-12 DIAGNOSIS — M47.26 OSTEOARTHRITIS OF SPINE WITH RADICULOPATHY, LUMBAR REGION: ICD-10-CM

## 2017-09-12 DIAGNOSIS — M51.37 DEGENERATION OF LUMBAR OR LUMBOSACRAL INTERVERTEBRAL DISC: ICD-10-CM

## 2017-09-12 DIAGNOSIS — M54.16 LUMBAR NEURITIS: ICD-10-CM

## 2017-09-12 DIAGNOSIS — M53.3 SACROILIAC DYSFUNCTION: ICD-10-CM

## 2017-09-12 RX ORDER — METHOCARBAMOL 750 MG/1
TABLET, FILM COATED ORAL
Qty: 90 TAB | Refills: 5 | Status: SHIPPED | OUTPATIENT
Start: 2017-09-12 | End: 2017-12-06 | Stop reason: SDUPTHER

## 2017-09-12 RX ORDER — FENTANYL 37.5 UG/H
1 PATCH, EXTENDED RELEASE TRANSDERMAL
Qty: 15 PATCH | Refills: 0 | Status: SHIPPED | OUTPATIENT
Start: 2017-09-16 | End: 2017-10-09 | Stop reason: SDUPTHER

## 2017-09-12 NOTE — PROGRESS NOTES
HISTORY OF PRESENT ILLNESS  Derek Phillips is a 63 y.o. female. Patient presents for follow up of chronic pain due to lumbar postlaminectomy syndrome and polyarthralgias, related to an industrial accident in [address] [facility]. She is also s/p right-sided mastectomy secondary to invasive ductal carcinoma. She is s/p lumbar fusion revision with Dr. Mirtha Hamilton on 5/3/16. She reports that she underwent the first of two RFA procedures for chronic sacroiliac dysfunction with Dr. Juan Manuel Cox five days ago on the left side. She already notices a small improvement in pain. She is scheduled for her right side RFA in about a week. We discussed that this can take 2-4 weeks to see optimal relief. She reports that her bowel and bladder issues, which she was concerned was caused by neurologic damage from her herniated disc/surgery, have resolved. After a series of consultations with Dr. Adriana Leung, he became suspicious that she had sphincter dysfunction due to higher dose opioids that were prescribed after her injury. And indeed, she reports that Movantik immediately resolved her bowel issues. We discussed the link between opioid use and gut dysmotility at length. Medications continue to work well for pain control overall. Derek Phillips is tolerating medications well, with no untoward side effects noted. She is able to stay more active with less discomfort with these current doses. The patient reports an average of 50% relief with this regimen. Most recent UDS and  were consistent with prescribed medications. Pill counts are appropriate. She is informed of side effects, risks, and benefits of this regimen, and emphasizes that she derives a significant improvement in functionality and quality of life, and notes that non-opioid medications and therapies in the past have not offered significant benefit. She does not wish to decrease her fentanyl at this time until she is able to finish her physical therapy and RFA.   She denies new or worsening insomnia or constipation issues. She denies any falls, injuries, or hospitalizations since the last visit. A total of 50 minutes was spent with the patient of which more than 50% of the time was spent counseling the patient. HPI--see above    ROS  Constitutional: Positive for fever, chills and malaise/fatigue. HENT: Positive for neck pain. Musculoskeletal: Positive for myalgias, back pain and joint pain. Neurological: Positive for headaches. Psychiatric/Behavioral: Negative for depression. The patient is not nervous/anxious and does not have insomnia. Physical Exam  Constitutional: She is oriented to person, place, and time. She appears well-developed and well-nourished. in better spirits  Musculoskeletal:   Marked spasms of cervical paraspinous and trapezius musculature noted        Right shoulder: She exhibits tenderness. Left shoulder: She exhibits tenderness. Lumbar back: She exhibits decreased range of motion, tenderness, pain and spasm. Back:           Arms:  Brisk sacroiliac tenderness noted  Marked spasms of lumbar paraspinous musculature noted  Wearing back brace       Right hand: She exhibits tenderness. Left hand: She exhibits tenderness. Neurological: She is alert and oriented to person, place, and time. No cranial nerve deficit. She exhibits normal muscle tone. Coordination normal.   Psychiatric: She has a normal mood and affect. Her behavior is normal. Judgment and thought content normal.   ASSESSMENT and PLAN    ICD-10-CM ICD-9-CM    1. Lumbar postlaminectomy syndrome M96.1 722.83 REFERRAL TO PHYSICAL THERAPY   2. Sacroiliac dysfunction M53.3 724.6 REFERRAL TO PHYSICAL THERAPY   3. Trochanteric bursitis of both hips M70.61 726.5 REFERRAL TO PHYSICAL THERAPY    M70.62     4. Osteoarthritis of spine with radiculopathy, lumbar region M47.26 721.3 REFERRAL TO PHYSICAL THERAPY   5. Fibromyalgia M79.7 729.1 REFERRAL TO PHYSICAL THERAPY   6.  Therapeutic opioid induced constipation K59.03 564.09 REFERRAL TO PHYSICAL THERAPY    T40.2X5A E935.2    7. Lumbar neuritis M54.16 724.4    8. Postlaminectomy syndrome, lumbar region M96.1 722.83    9. Degeneration of lumbar intervertebral disc M51.37 722.52    10. Pain in joint, multiple sites M25.50 719.49    11. Arthralgia of shoulder, unspecified laterality M25.519 719.41    12. Generalized osteoarthritis M15.9 715.00    13. Encounter for long-term (current) use of high-risk medication Z79.899 V58.69    14. Foraminal stenosis of lumbar region M99.83 724.02       Plan:  Continue same medications as prescribed for chronic pain  Right side RFA with Dr. Vamshi Vasquez soon  Can take 4 weeks to see full relief after RF procedure!   Continue PT for 12 more weeks--if not substantially improved, will discontinue  Follow up in 1 months or sooner if needed  Regular exercise and attention to emotional health and diet remain the most effective ways to treat chronic pain of all kinds  You may contact me with questions or concerns through [address]

## 2017-09-12 NOTE — MR AVS SNAPSHOT
Visit Information Date & Time Provider Department Dept. Phone Encounter #  
 9/12/2017  3:00 PM Sergio Bautista [address] for Pain Management [phone] Follow-up Instructions Return in about 1 month (around 10/12/2017). Your Appointments 9/21/2017  3:00 PM  
New Patient with Levell Scheuermann, PT [facility]. Michael [address] ([facility]) Appt Note: Wkmen Comp npppw mailed, referral from Dr Mayda Calvert, returned call//afj  
 [address] [phone]  
  
    
 9/28/2017  2:00 PM  
PHYSICAL THERAPY with Levell Scheuermann, PT [facility]. Michael [facility] [address] ([facility]) Appt Note: Wkmen comp f/u per referral Dr Chelsea Salinas  
 [address] [phone]  
  
    
 10/9/2017  2:00 PM  
Follow Up with Angelica Gonzales PA-C [address] for [facility] ([facility]OLF SCHEDULING) Appt Note: Return in about 2 months (around 10/7/2017). And Also  [address] [place], [place] [id number]  
[phone] [facility] [phone] Upcoming Health Maintenance Date Due Pneumococcal 19-64 Highest Risk (1 of 3 - PCV13) 12/23/1976 BREAST CANCER SCRN MAMMOGRAM 10/29/2015 INFLUENZA AGE 9 TO ADULT 8/1/2017 PAP AKA CERVICAL CYTOLOGY 12/13/2019 COLONOSCOPY 3/15/2023 DTaP/Tdap/Td series (2 - Td) 4/1/2025 Allergies as of 9/12/2017  Review Complete On: 9/12/2017 By: Jax River LPN Severity Noted Reaction Type Reactions Adhesive    Rash Aspirin  06/20/2011    Other (comments), Nausea and Vomiting G6PD 
GI BLEED AND ULCER Contrast Agent [Iodine]    Rash Allergic to CT Dye  
 Dilantin [Phenytoin Sodium Extended]    Other (comments) Difficulty waking Iodinated Contrast- Oral And Iv Dye  05/03/2016    Rash \"bumps on face\" Lipitor [Atorvastatin]  12/13/2016    Other (comments) PKU Pcn [Penicillins]    Rash, Hives \"sores all over the body\" Phenytoin  05/03/2016    Unknown (comments) \"Trouble waking up\" Sulfa (Sulfonamide Antibiotics)    Rash, Nausea and Vomiting G6PD 
G6PD Tegretol [Carbamazepine]    Other (comments), Vertigo \"Trouble waking up\" Difficulty waking up Current Immunizations  Reviewed on 11/25/2015 Name Date Influenza Vaccine 11/25/2015  7:48 AM  
 Tdap 4/1/2015 Not reviewed this visit You Were Diagnosed With   
  
 Codes Comments Lumbar postlaminectomy syndrome    -  Primary ICD-10-CM: M96.1 ICD-9-CM: 722.83 Sacroiliac dysfunction     ICD-10-CM: M53.3 ICD-9-CM: 724.6 Trochanteric bursitis of both hips     ICD-10-CM: M70.61, M70.62 ICD-9-CM: 726.5 Osteoarthritis of spine with radiculopathy, lumbar region     ICD-10-CM: M47.26 
ICD-9-CM: 721.3 Fibromyalgia     ICD-10-CM: M79.7 ICD-9-CM: 729.1 Therapeutic opioid induced constipation     ICD-10-CM: K59.03, T40.2X5A 
ICD-9-CM: 564.09, E935.2 Lumbar neuritis     ICD-10-CM: M54.16 
ICD-9-CM: 724.4 Postlaminectomy syndrome, lumbar region     ICD-10-CM: M96.1 ICD-9-CM: 722.83 Degeneration of lumbar or lumbosacral intervertebral disc     ICD-10-CM: M51.37 
ICD-9-CM: 722.52 Pain in joint, multiple sites     ICD-10-CM: M25.50 ICD-9-CM: 719.49 Arthralgia of shoulder, unspecified laterality     ICD-10-CM: M25.519 ICD-9-CM: 719.41 Generalized osteoarthritis     ICD-10-CM: M15.9 ICD-9-CM: 715.00 Encounter for long-term (current) use of high-risk medication     ICD-10-CM: Z79.899 ICD-9-CM: V58.69 Foraminal stenosis of lumbar region     ICD-10-CM: M99.83 ICD-9-CM: 724.02 Vitals BP Pulse Temp Weight(growth percentile) LMP BMI (!) 169/92 68 99 °F (37.2 °C) 165 lb (74.8 kg) 08/28/2002 30.18 kg/m2 OB Status Smoking Status Postmenopausal Never Smoker Vitals History BMI and BSA Data Body Mass Index Body Surface Area  
 30.18 kg/m 2 1.81 m 2 Preferred Pharmacy Pharmacy Name Phone [facility] [address] [phone] Your Updated Medication List  
  
   
This list is accurate as of: 9/12/17  4:36 PM.  Always use your most recent med list.  
  
  
  
  
 acetaminophen 500 mg tablet Commonly known as:  TYLENOL Take  by mouth every six (6) hours as needed for Pain. CLARITIN 10 mg tablet Generic drug:  loratadine Take 10 mg by mouth daily as needed. diclofenac 3 % topical gel Commonly known as:  Mattie Spencer Apply 2-4 g to affected area two (2) times a day. As needed for joint pain. Apply to painful areas  
  
 fentaNYL 37.5 mcg/hour Pt72  
1 Patch by TransDERmal route every fourty-eight (48) hours. Max Daily Amount: 1 Patch. Start taking on:  9/16/2017  
  
 levothyroxine 100 mcg tablet Commonly known as:  SYNTHROID  
TAKE 1 TABLET BY MOUTH DAILY (BEFORE BREAKFAST) lidocaine 5 % ointment Commonly known as:  XYLOCAINE Apply 1-2 g to affected area as needed for Pain. To painful areas  
  
 methocarbamol 750 mg tablet Commonly known as:  ROBAXIN  
TAKE 1 TAB BY MOUTH THREE (3) TIMES DAILY. AS NEEDED FOR MUSCLE SPASMS  
  
 NASONEX 50 mcg/actuation nasal spray Generic drug:  mometasone 2 Sprays daily as needed. ondansetron 8 mg disintegrating tablet Commonly known as:  ZOFRAN ODT Take 1 Tab by mouth every eight (8) hours as needed for Nausea. ondansetron hcl 8 mg tablet Commonly known as:  Vladimir Small Take 8 mg by mouth. traMADol 50 mg tablet Commonly known as:  ULTRAM  
Take 2 Tabs by mouth three (3) times daily as needed for Pain. Max Daily Amount: 300 mg.  
  
 traZODone 50 mg tablet Commonly known as:  DESYREL  
TAKE 2 TABS BY MOUTH NIGHTLY. AS NEEDED FOR INSOMNIA  
  
 ZANTAC 150 mg tablet Generic drug:  raNITIdine Take 150 mg by mouth daily as needed. Prescriptions Printed Refills  
 fentaNYL 37.5 mcg/hour pt72 0 Starting on: 2017 Si Patch by TransDERmal route every fourty-eight (48) hours. Max Daily Amount: 1 Patch. Class: Print Route: TransDERmal  
  
Prescriptions Sent to Pharmacy Refills  
 methocarbamol (ROBAXIN) 750 mg tablet 5 Sig: TAKE 1 TAB BY MOUTH THREE (3) TIMES DAILY. AS NEEDED FOR MUSCLE SPASMS Class: Normal  
 Pharmacy: [facility]/pharmacy #[id number] [facility] [address] [place] [address] #: [phone] We Performed the Following REFERRAL TO PHYSICAL THERAPY [AKS20 Custom] Comments:  
 Please continue to treat Ms. Chapis Booth for chronic multifocal pain, daytime fatigue, muscle soreness, and chronic back pain due to fibromyalgia and lumbar postlaminectomy syndrome. Please start with aquatic therapy and transition as tolerated to land-based therapy--two visits per week for 12 weeks, extending as needed considering progress. Thank you in advance for seeing this pleasant patient Follow-up Instructions Return in about 1 month (around 10/12/2017). Referral Information Referral ID Referred By Referred To  
  
 [id number] Tere Rios Not Available Visits Status Start Date End Date 1 New Request 17 If your referral has a status of pending review or denied, additional information will be sent to support the outcome of this decision. Patient Instructions Plan: 
Continue same medications as prescribed for chronic pain Right side RFA with Dr. Dolores Portillo soon Can take 4 weeks to see full relief after RF procedure! Continue PT for 12 more weeks--if not substantially improved, will discontinue Follow up in 1 months or sooner if needed Regular exercise and attention to emotional health and diet remain the most effective ways to treat chronic pain of all kinds You may contact me with questions or concerns through [address] Introducing [facility]! Joshua Melba introduces [employer] patient portal. Now you can access parts of your medical record, email your doctor's office, and request medication refills online. 1. In your internet browser, go to [email or web address] 2. Click on the First Time User? Click Here link in the Sign In box. You will see the New Member Sign Up page. 3. Enter your [employer] Access Code exactly as it appears below. You will not need to use this code after youve completed the sign-up process. If you do not sign up before the expiration date, you must request a new code. · [employer] Access Code: [email or web address] Expires: 12/11/2017  4:10 PM 
 
4. Enter the last four digits of your Social Security Number (xxxx) and Date of Birth (mm/dd/yyyy) as indicated and click Submit. You will be taken to the next sign-up page. 5. Create a [employer] ID. This will be your [employer] login ID and cannot be changed, so think of one that is secure and easy to remember. 6. Create a [employer] password. You can change your password at any time. 7. Enter your Password Reset Question and Answer. This can be used at a later time if you forget your password. 8. Enter your e-mail address. You will receive e-mail notification when new information is available in [address] 9. Click Sign Up. You can now view and download portions of your medical record. 10. Click the Download Summary menu link to download a portable copy of your medical information. If you have questions, please visit the Frequently Asked Questions section of the [employer] website. Remember, [employer] is NOT to be used for urgent needs. For medical emergencies, dial 911. Now available from your iPhone and Android! Please provide this summary of care documentation to your next provider. Your primary care clinician is listed as Mahesh 51. If you have any questions after today's visit, please call [phone].

## 2017-09-12 NOTE — PROGRESS NOTES
Nursing Notes    Patient presents to the office today in follow-up. Patient rates her pain at 7/10 on the numerical pain scale. Reviewed medications with counts as follows:    Rx Date filled Qty Dispensed Pill Count Last Dose Short   Fentanyl 37.5 mcg 08/18/17 15 3 This am  no   Tramadol 50 mg 08/20/17 180 159 today no     :    Comments:  Patient states she had the radio requencey ablasion on the left side   Ans is getting the right side done next. Patient is here for a follow up appt today she states she has a pain level  Today of 7      POC UDS was not performed in office today    Any new labs or imaging since last appointment? NO    Have you been to an emergency room (ER) or urgent care clinic since your last visit? NO            Have you been hospitalized since your last visit? NO     If yes, where, when, and reason for visit? Have you seen or consulted any other health care providers outside of the [address] [place] [address]  since your last visit? GI [facility] oncology,   If yes, where, when, and reason for visit? Ms. Cherelle Benz has a reminder for a \"due or due soon\" health maintenance. I have asked that she contact her primary care provider for follow-up on this health maintenance.

## 2017-09-12 NOTE — PATIENT INSTRUCTIONS
Plan:  Continue same medications as prescribed for chronic pain  Right side RFA with Dr. Helen Florez soon  Can take 4 weeks to see full relief after RF procedure!   Continue PT for 12 more weeks--if not substantially improved, will discontinue  Follow up in 1 months or sooner if needed  Regular exercise and attention to emotional health and diet remain the most effective ways to treat chronic pain of all kinds  You may contact me with questions or concerns through [address]

## 2017-10-09 ENCOUNTER — OFFICE VISIT (OUTPATIENT)
Dept: PAIN MANAGEMENT | Age: 62
End: 2017-10-09

## 2017-10-09 VITALS
WEIGHT: 158 LBS | HEIGHT: 62 IN | RESPIRATION RATE: 18 BRPM | HEART RATE: 71 BPM | DIASTOLIC BLOOD PRESSURE: 83 MMHG | BODY MASS INDEX: 29.08 KG/M2 | TEMPERATURE: 99 F | SYSTOLIC BLOOD PRESSURE: 142 MMHG

## 2017-10-09 DIAGNOSIS — T45.1X5A CHEMOTHERAPY-INDUCED NEUROPATHY (HCC): ICD-10-CM

## 2017-10-09 DIAGNOSIS — M47.816 LUMBAR FACET ARTHROPATHY: ICD-10-CM

## 2017-10-09 DIAGNOSIS — Z79.899 ENCOUNTER FOR LONG-TERM (CURRENT) USE OF HIGH-RISK MEDICATION: ICD-10-CM

## 2017-10-09 DIAGNOSIS — M25.50 PAIN IN JOINT, MULTIPLE SITES: ICD-10-CM

## 2017-10-09 DIAGNOSIS — M96.1 LUMBAR POST-LAMINECTOMY SYNDROME: ICD-10-CM

## 2017-10-09 DIAGNOSIS — M54.41 CHRONIC MIDLINE LOW BACK PAIN WITH RIGHT-SIDED SCIATICA: ICD-10-CM

## 2017-10-09 DIAGNOSIS — G62.0 CHEMOTHERAPY-INDUCED NEUROPATHY (HCC): ICD-10-CM

## 2017-10-09 DIAGNOSIS — F51.04 CHRONIC INSOMNIA: ICD-10-CM

## 2017-10-09 DIAGNOSIS — M15.9 GENERALIZED OSTEOARTHRITIS: ICD-10-CM

## 2017-10-09 DIAGNOSIS — M51.37 DEGENERATION OF LUMBAR OR LUMBOSACRAL INTERVERTEBRAL DISC: ICD-10-CM

## 2017-10-09 DIAGNOSIS — G89.29 CHRONIC MIDLINE LOW BACK PAIN WITH RIGHT-SIDED SCIATICA: ICD-10-CM

## 2017-10-09 DIAGNOSIS — M54.16 LUMBAR NEURITIS: ICD-10-CM

## 2017-10-09 DIAGNOSIS — M48.061 FORAMINAL STENOSIS OF LUMBAR REGION: ICD-10-CM

## 2017-10-09 DIAGNOSIS — K59.03 DRUG-INDUCED CONSTIPATION: ICD-10-CM

## 2017-10-09 DIAGNOSIS — M96.1 POSTLAMINECTOMY SYNDROME, LUMBAR REGION: ICD-10-CM

## 2017-10-09 DIAGNOSIS — M46.1 BILATERAL SACROILIITIS (HCC): Primary | ICD-10-CM

## 2017-10-09 DIAGNOSIS — M25.519 ARTHRALGIA OF SHOULDER, UNSPECIFIED LATERALITY: ICD-10-CM

## 2017-10-09 RX ORDER — FENTANYL 37.5 UG/H
1 PATCH, EXTENDED RELEASE TRANSDERMAL
Qty: 15 PATCH | Refills: 0 | Status: SHIPPED | OUTPATIENT
Start: 2017-10-19 | End: 2017-12-06 | Stop reason: SDUPTHER

## 2017-10-09 RX ORDER — FENTANYL 37.5 UG/H
1 PATCH, EXTENDED RELEASE TRANSDERMAL
Qty: 15 PATCH | Refills: 0 | Status: SHIPPED | OUTPATIENT
Start: 2017-11-18 | End: 2017-12-06 | Stop reason: SDUPTHER

## 2017-10-09 NOTE — PATIENT INSTRUCTIONS
Plan:  Continue same medications as prescribed for chronic pain  We will discuss weaning further on your fentanyl at your next visit, after you have completed all therapies and interventions  Follow up in 2 months or sooner if needed  Regular exercise and attention to emotional health and diet remain the most effective ways to treat chronic pain of all kinds  You may contact me with questions or concerns through [address]

## 2017-10-09 NOTE — PROGRESS NOTES
Nursing Notes    Patient presents to the office today in follow-up. Patient rates her pain at 7/10 on the numerical pain scale. Reviewed medications with counts as follows:    Rx Date filled Qty Dispensed Pill Count Last Dose Short   Tramadol hcl 50 mg 08/20/17 180 70 This a.m no   Fentanyl 37.5 mcg 09/20/17 15 3 Last night no                                  POC UDS was not performed in office today    Any new labs or imaging since last appointment? NO    Have you been to an emergency room (ER) or urgent care clinic since your last visit? NO            Have you been hospitalized since your last visit? NO     If yes, where, when, and reason for visit? Have you seen or consulted any other health care providers outside of the [address] [place] [address]  since your last visit? Yes, physical therapy, [address] therapy. If yes, where, when, and reason for visit?      Health Maintenance reviewed

## 2017-10-09 NOTE — PROGRESS NOTES
HISTORY OF PRESENT ILLNESS  Loni Delgado is a 63 y.o. female. Patient presents for follow up of chronic pain due to lumbar postlaminectomy syndrome and polyarthralgias, related to an industrial accident in [address] [facility]. She is also s/p right-sided mastectomy secondary to invasive ductal carcinoma. She is s/p lumbar fusion revision with Dr. Gilda Dejesus on 5/3/16. She reports that her left side RFA for the sacroiliac pain performed by Dr. Ilana Laughlin on 9/7/17 was extremely beneficial for her pain, but she is still awaiting the right side RFA in a week. Her right side is now extremely painful now that her left side is improved, and this has been frustrating for her. She is in the process of completing physical therapy (over five months total), and feels that this has improved fitness and to a lesser extent her pain. Medications continue to work well for pain control overall. Loni Delgado is tolerating medications well, with no untoward side effects noted. She is able to stay more active with less discomfort with these current doses. The patient reports an average of 60-70% relief with this regimen. Most recent UDS and  were consistent with prescribed medications. Pill counts are appropriate. She is informed of side effects, risks, and benefits of this regimen, and emphasizes that she derives a significant improvement in functionality and quality of life, and notes that non-opioid medications and therapies in the past have not offered significant benefit. We will defer further weaning of her Fentanyl 37.5 mcg/hr until after she has completed PT and has received her RFA of the right SI joint. We also briefly discussed SCS, but she is disinterested in any further surgeries of any kind at this time. We also discussed the departure of Dr. Bertin Busby and myself from the practice and discussed at length \"next steps\".  She chooses to stay with the practice for the time being but may choose to follow Dr. Bertin Busby if this practice declines to continue this current regimen. She denies new or worsening insomnia or constipation issues. She denies any falls, injuries, or hospitalizations since the last visit. A total of 45 minutes was spent with the patient of which more than 50% of the time was spent counseling the patient. HPI--see above    ROS  Constitutional: Positive for fever, chills and malaise/fatigue. HENT: Positive for neck pain. Musculoskeletal: Positive for myalgias, back pain and joint pain. Neurological: Positive for headaches. Psychiatric/Behavioral: Negative for depression. The patient is not nervous/anxious and does not have insomnia. Physical Exam  Constitutional: She is oriented to person, place, and time. She appears well-developed and well-nourished. in better spirits  Musculoskeletal:   Marked spasms of cervical paraspinous and trapezius musculature noted        Right shoulder: She exhibits tenderness. Left shoulder: She exhibits tenderness. Lumbar back: She exhibits decreased range of motion, tenderness, pain and spasm. Back:           Arms:  Brisk sacroiliac tenderness noted  Marked spasms of lumbar paraspinous musculature noted  Wearing back brace       Right hand: She exhibits tenderness. Left hand: She exhibits tenderness. Neurological: She is alert and oriented to person, place, and time. No cranial nerve deficit. She exhibits normal muscle tone. Coordination normal.   Psychiatric: She has a normal mood and affect. Her behavior is normal. Judgment and thought content normal.   ASSESSMENT and PLAN    ICD-10-CM ICD-9-CM    1. Bilateral sacroiliitis (HCC) M46.1 720.2    2. Chemotherapy-induced neuropathy (HCC) G62.0 357.6     T45.1X5A E933.1    3. Lumbar neuritis M54.16 724.4    4. Chronic midline low back pain with right-sided sciatica M54.41 724.2     G89.29 724.3      338.29    5. Postlaminectomy syndrome, lumbar region M96.1 722.83    6.  Degeneration of lumbar intervertebral disc M51.37 722.52    7. Pain in joint, multiple sites M25.50 719.49    8. Arthralgia of shoulder, unspecified laterality M25.519 719.41    9. Generalized osteoarthritis M15.9 715.00    10. Chronic insomnia F51.04 780.52    11. Encounter for long-term (current) use of high-risk medication Z79.899 V58.69    12. Foraminal stenosis of lumbar region M99.83 724.02    13. Lumbar facet arthropathy M12.88 721.3    14. Lumbar post-laminectomy syndrome M96.1 722.83    15.  Drug-induced constipation K59.03 564.09      E980.5       Plan:  Continue same medications as prescribed for chronic pain  We will discuss weaning further on your fentanyl at your next visit, after you have completed all therapies and interventions  Follow up in 2 months or sooner if needed with Dr. Dilcia Fox  Regular exercise and attention to emotional health and diet remain the most effective ways to treat chronic pain of all kinds  You may contact me with questions or concerns through [address]

## 2017-10-09 NOTE — MR AVS SNAPSHOT
Visit Information Date & Time Provider Department Dept. Phone Encounter #  
 10/9/2017  2:00 PM Mj Hawkins [address] [place] [address] for Pain Management [phone] Follow-up Instructions Return in about 2 months (around 12/9/2017). Follow-up and Disposition History Your Appointments 11/15/2017 11:00 AM  
New Patient with Tiffanie Gu, PT [facility]. [address] ([facility]) Appt Note: Wkmen Comp npppw mailed, referral from Dr Paulo Terrell, returned call//afj; [facility] will not approve these visits as they are not related to patient's injuries. Per Ivy Molina, Nurse . TVega 09.08.17; EVAL ONLY TODAY. No [address] [facility] is required for PT eval but all future visits require auth. Deductible is met but patient still has $45 copay for PT eval. $335 met towards $6,700 calendar year OOP. -mcf 9/14/17  
 [address] [facility] [address] [facility] [phone]  
  
    
 11/27/2017 11:00 AM  
PHYSICAL THERAPY with Tiffanie Gu, PT [facility]. [address] ([facility]) Appt Note: Wkmen comp f/u per referral Dr Vicky Albright  
 [address] [facility] [address]  
[phone]  
  
   
 [facility] [phone] Upcoming Health Maintenance Date Due Pneumococcal 19-64 Highest Risk (1 of 3 - PCV13) 12/23/1976 BREAST CANCER SCRN MAMMOGRAM 10/29/2015 INFLUENZA AGE 9 TO ADULT 8/1/2017 PAP AKA CERVICAL CYTOLOGY 12/13/2019 COLONOSCOPY 3/15/2023 DTaP/Tdap/Td series (2 - Td) 4/1/2025 Allergies as of 10/9/2017  Review Complete On: 10/9/2017 By: Sherice Robledo Severity Noted Reaction Type Reactions Adhesive    Rash Aspirin  06/20/2011    Other (comments), Nausea and Vomiting G6PD 
GI BLEED AND ULCER Contrast Agent [Iodine]    Rash  Allergic to CT Dye  
 Dilantin [Phenytoin Sodium Extended]    Other (comments) Difficulty waking Iodinated Contrast- Oral And Iv Dye  05/03/2016    Rash \"bumps on face\" Lipitor [Atorvastatin]  12/13/2016    Other (comments) PKU Pcn [Penicillins]    Rash, Hives \"sores all over the body\" Phenytoin  05/03/2016    Unknown (comments) \"Trouble waking up\" Sulfa (Sulfonamide Antibiotics)    Rash, Nausea and Vomiting G6PD 
G6PD Tegretol [Carbamazepine]    Other (comments), Vertigo \"Trouble waking up\" Difficulty waking up Current Immunizations  Reviewed on 11/25/2015 Name Date Influenza Vaccine 11/25/2015  7:48 AM  
 Tdap 4/1/2015 Not reviewed this visit You Were Diagnosed With   
  
 Codes Comments Bilateral sacroiliitis (RUSTca 75.)    -  Primary ICD-10-CM: M46.1 ICD-9-CM: 720.2 Chemotherapy-induced neuropathy (HCC)     ICD-10-CM: G62.0, T45.1X5A 
ICD-9-CM: 357.6, E933.1 Lumbar neuritis     ICD-10-CM: M54.16 
ICD-9-CM: 724.4 Chronic midline low back pain with right-sided sciatica     ICD-10-CM: M54.41, G89.29 ICD-9-CM: 724.2, 724.3, 338.29 Postlaminectomy syndrome, lumbar region     ICD-10-CM: M96.1 ICD-9-CM: 722.83 Degeneration of lumbar or lumbosacral intervertebral disc     ICD-10-CM: M51.37 
ICD-9-CM: 722.52 Pain in joint, multiple sites     ICD-10-CM: M25.50 ICD-9-CM: 719.49 Arthralgia of shoulder, unspecified laterality     ICD-10-CM: M25.519 ICD-9-CM: 719.41 Generalized osteoarthritis     ICD-10-CM: M15.9 ICD-9-CM: 715.00 Chronic insomnia     ICD-10-CM: F51.04 
ICD-9-CM: 780.52 Encounter for long-term (current) use of high-risk medication     ICD-10-CM: Z79.899 ICD-9-CM: V58.69 Foraminal stenosis of lumbar region     ICD-10-CM: M99.83 ICD-9-CM: 724.02 Lumbar facet arthropathy     ICD-10-CM: M12.88 ICD-9-CM: 721.3 Lumbar post-laminectomy syndrome     ICD-10-CM: M96.1 ICD-9-CM: 722.83   
 Drug-induced constipation     ICD-10-CM: K59.03 
ICD-9-CM: 564.09, E980.5 Vitals BP Pulse Temp Resp Height(growth percentile) Weight(growth percentile) 142/83 (BP 1 Location: Right arm, BP Patient Position: Sitting) 71 99 °F (37.2 °C) (Oral) 18 5' 2\" (1.575 m) 158 lb (71.7 kg) LMP BMI OB Status Smoking Status 08/28/2002 28.9 kg/m2 Postmenopausal Never Smoker BMI and BSA Data Body Mass Index Body Surface Area  
 28.9 kg/m 2 1.77 m 2 Preferred Pharm[facility] Name Phone [facility] [place], [facility] [address] [phone] Your Updated Medication List  
  
   
This list is accurate as of: 10/9/17  3:07 PM.  Always use your most recent med list.  
  
  
  
  
 acetaminophen 500 mg tablet Commonly known as:  TYLENOL Take  by mouth every six (6) hours as needed for Pain. CLARITIN 10 mg tablet Generic drug:  loratadine Take 10 mg by mouth daily as needed. diclofenac 3 % topical gel Commonly known as:  Yin Presto Apply 2-4 g to affected area two (2) times a day. As needed for joint pain. Apply to painful areas * fentaNYL 37.5 mcg/hour Pt72  
1 Patch by TransDERmal route every fourty-eight (48) hours. Max Daily Amount: 1 Patch. Start taking on:  10/19/2017 * fentaNYL 37.5 mcg/hour Pt72  
1 Patch by TransDERmal route every fourty-eight (48) hours. Max Daily Amount: 1 Patch. for chronic, severe, refractory pain. Mylan, Sandoz, or Mallinckrodt brands preferred Start taking on:  11/18/2017  
  
 levothyroxine 100 mcg tablet Commonly known as:  SYNTHROID  
TAKE 1 TABLET BY MOUTH DAILY (BEFORE BREAKFAST) lidocaine 5 % ointment Commonly known as:  XYLOCAINE Apply 1-2 g to affected area as needed for Pain. To painful areas  
  
 methocarbamol 750 mg tablet Commonly known as:  ROBAXIN  
TAKE 1 TAB BY MOUTH THREE (3) TIMES DAILY. AS NEEDED FOR MUSCLE SPASMS  
  
 NASONEX 50 mcg/actuation nasal spray Generic drug:  mometasone 2 Sprays daily as needed. ondansetron 8 mg disintegrating tablet Commonly known as:  ZOFRAN ODT Take 1 Tab by mouth every eight (8) hours as needed for Nausea. ondansetron hcl 8 mg tablet Commonly known as:  Bdu Even Take 8 mg by mouth. traMADol 50 mg tablet Commonly known as:  ULTRAM  
Take 2 Tabs by mouth three (3) times daily as needed for Pain. Max Daily Amount: 300 mg.  
  
 traZODone 50 mg tablet Commonly known as:  DESYREL  
TAKE 2 TABS BY MOUTH NIGHTLY. AS NEEDED FOR INSOMNIA  
  
 ZANTAC 150 mg tablet Generic drug:  raNITIdine Take 150 mg by mouth daily as needed. * Notice: This list has 2 medication(s) that are the same as other medications prescribed for you. Read the directions carefully, and ask your doctor or other care provider to review them with you. Prescriptions Printed Refills  
 fentaNYL 37.5 mcg/hour pt72 0 Starting on: 10/19/2017 Si Patch by TransDERmal route every fourty-eight (48) hours. Max Daily Amount: 1 Patch. Class: Print Route: TransDERmal  
 fentaNYL 37.5 mcg/hour pt72 0 Starting on: 2017 Si Patch by TransDERmal route every fourty-eight (48) hours. Max Daily Amount: 1 Patch. for chronic, severe, refractory pain. Mylan, Sandoz, or Mallinckrodt brands preferred Class: Print Route: TransDERmal  
  
Follow-up Instructions Return in about 2 months (around 2017). Patient Instructions Plan: 
Continue same medications as prescribed for chronic pain We will discuss weaning further on your fentanyl at your next visit, after you have completed all therapies and interventions Follow up in 2 months or sooner if needed Regular exercise and attention to emotional health and diet remain the most effective ways to treat chronic pain of all kinds You may contact me with questions or concerns through [address] Introducing hospitals & HEALTH SERVICES! Rica Buckley introduces [employer] patient portal. Now you can access parts of your medical record, email your doctor's office, and request medication refills online. 1. In your internet browser, go to [email or web address] 2. Click on the First Time User? Click Here link in the Sign In box. You will see the New Member Sign Up page. 3. Enter your [employer] Access Code exactly as it appears below. You will not need to use this code after youve completed the sign-up process. If you do not sign up before the expiration date, you must request a new code. · [employer] Access Code: [email or web address] Expires: 12/11/2017  4:10 PM 
 
4. Enter the last four digits of your Social Security Number (xxxx) and Date of Birth (mm/dd/yyyy) as indicated and click Submit. You will be taken to the next sign-up page. 5. Create a [employer] ID. This will be your [employer] login ID and cannot be changed, so think of one that is secure and easy to remember. 6. Create a [employer] password. You can change your password at any time. 7. Enter your Password Reset Question and Answer. This can be used at a later time if you forget your password. 8. Enter your e-mail address. You will receive e-mail notification when new information is available in [address] 9. Click Sign Up. You can now view and download portions of your medical record. 10. Click the Download Summary menu link to download a portable copy of your medical information. If you have questions, please visit the Frequently Asked Questions section of the [employer] website. Remember, [employer] is NOT to be used for urgent needs. For medical emergencies, dial 911. Now available from your iPhone and Android! Please provide this summary of care documentation to your next provider. Your primary care clinician is listed as Mahesh 51. If you have any questions after today's visit, please call [phone].

## 2017-11-28 ENCOUNTER — OFFICE VISIT (OUTPATIENT)
Dept: FAMILY MEDICINE CLINIC | Age: 62
End: 2017-11-28

## 2017-11-28 VITALS
OXYGEN SATURATION: 98 % | TEMPERATURE: 98.7 F | BODY MASS INDEX: 30.47 KG/M2 | DIASTOLIC BLOOD PRESSURE: 86 MMHG | RESPIRATION RATE: 22 BRPM | HEART RATE: 73 BPM | WEIGHT: 165.6 LBS | SYSTOLIC BLOOD PRESSURE: 142 MMHG | HEIGHT: 62 IN

## 2017-11-28 DIAGNOSIS — H10.9 CONJUNCTIVITIS OF BOTH EYES, UNSPECIFIED CONJUNCTIVITIS TYPE: ICD-10-CM

## 2017-11-28 DIAGNOSIS — J30.1 CHRONIC SEASONAL ALLERGIC RHINITIS DUE TO POLLEN: Primary | ICD-10-CM

## 2017-11-28 DIAGNOSIS — J31.0 PURULENT RHINITIS: ICD-10-CM

## 2017-11-28 RX ORDER — DOXYCYCLINE 100 MG/1
CAPSULE ORAL
Qty: 20 CAP | Refills: 0 | Status: SHIPPED | OUTPATIENT
Start: 2017-11-28 | End: 2018-01-23 | Stop reason: ALTCHOICE

## 2017-11-28 RX ORDER — TOBRAMYCIN 3 MG/ML
1 SOLUTION/ DROPS OPHTHALMIC EVERY 6 HOURS
Qty: 1 BOTTLE | Refills: 0 | Status: SHIPPED | OUTPATIENT
Start: 2017-11-28 | End: 2017-12-08

## 2017-11-28 RX ORDER — METHYLPREDNISOLONE 4 MG/1
TABLET ORAL
Qty: 1 DOSE PACK | Refills: 0 | Status: SHIPPED | OUTPATIENT
Start: 2017-11-28 | End: 2017-12-06 | Stop reason: ALTCHOICE

## 2017-11-28 NOTE — PROGRESS NOTES
HISTORY OF PRESENT ILLNESS  Isauro Smiley is a 63 y.o. female. HPI Comments: Patient with chronic allergy symptoms with seasonal exacerbations, already on immunotherapy, taking Claritin and using Nasonex nasal spray. Cold Symptoms   The history is provided by the patient and medical records. This is a recurrent problem. Episode onset: about 6 weeks ago. Associated symptoms include eye redness, headaches (frontal) and sore throat (off and on). Pertinent negatives include no chest pain, no chills, no shortness of breath and no wheezing.      Patient Active Problem List   Diagnosis Code    Chronic low back pain M54.5, G89.29    Postlaminectomy syndrome, lumbar region M96.1    Degeneration of lumbar intervertebral disc M51.37    Pain in joint, multiple sites M25.50    Pain in joint, shoulder region M25.519    Generalized osteoarthritis M15.9    History of breast cancer C50.919    Unspecified hypothyroidism E03.9    HLD (hyperlipidemia) E78.5    G6PD (glucose 6 phosphatase deficiency)     Shoulder pain, left M25.512    Recurrent UTI N39.0    Chemotherapy-induced neuropathy (HCC) G62.0, T45.1X5A    Chest wall pain following surgery R07.89, G89.18    Renal insufficiency N28.9    Proteinuria R80.9    Chronic insomnia F51.04    Paroxysmal sleep G47.419    Encounter for long-term (current) use of high-risk medication Z79.899    Foraminal stenosis of lumbar region M99.83    Lumbar facet arthropathy M12.88    Lumbar post-laminectomy syndrome M96.1    Lumbar neuritis M54.16    Spasm of back muscles M62.830    Drug-induced constipation K59.03    Bilateral sacroiliitis (HCC) M46.1    Drug-induced nausea and vomiting R11.2, T50.905A    Irritable bowel syndrome with both constipation and diarrhea K58.2    Fibromyalgia M79.7    Anxiety F41.9    Depression F32.9    Hypersomnolence G47.10    Chronic pain syndrome G89.4       Current Outpatient Prescriptions:     fentaNYL 37.5 mcg/hour pt72, 1 Patch by TransDERmal route every fourty-eight (48) hours. Max Daily Amount: 1 Patch., Disp: 15 Patch, Rfl: 0    fentaNYL 37.5 mcg/hour pt72, 1 Patch by TransDERmal route every fourty-eight (48) hours. Max Daily Amount: 1 Patch. for chronic, severe, refractory pain. Mylan, Sandoz, or Mallinckrodt brands preferred, Disp: 15 Patch, Rfl: 0    methocarbamol (ROBAXIN) 750 mg tablet, TAKE 1 TAB BY MOUTH THREE (3) TIMES DAILY. AS NEEDED FOR MUSCLE SPASMS, Disp: 90 Tab, Rfl: 5    traZODone (DESYREL) 50 mg tablet, TAKE 2 TABS BY MOUTH NIGHTLY. AS NEEDED FOR INSOMNIA, Disp: 60 Tab, Rfl: 3    traMADol (ULTRAM) 50 mg tablet, Take 2 Tabs by mouth three (3) times daily as needed for Pain. Max Daily Amount: 300 mg., Disp: 180 Tab, Rfl: 3    levothyroxine (SYNTHROID) 100 mcg tablet, TAKE 1 TABLET BY MOUTH DAILY (BEFORE BREAKFAST), Disp: 90 Tab, Rfl: 2    diclofenac (SOLARAZE) 3 % topical gel, Apply 2-4 g to affected area two (2) times a day. As needed for joint pain. Apply to painful areas, Disp: 100 g, Rfl: 5    lidocaine (XYLOCAINE) 5 % ointment, Apply 1-2 g to affected area as needed for Pain. To painful areas, Disp: 120 g, Rfl: 5    acetaminophen (TYLENOL) 500 mg tablet, Take  by mouth every six (6) hours as needed for Pain., Disp: , Rfl:     mometasone (NASONEX) 50 mcg/actuation nasal spray, 2 Sprays daily as needed. , Disp: , Rfl:     ranitidine (ZANTAC) 150 mg tablet, Take 150 mg by mouth daily as needed. , Disp: , Rfl:     loratadine (CLARITIN) 10 mg tablet, Take 10 mg by mouth daily as needed. , Disp: , Rfl:     Allergies   Allergen Reactions    Adhesive Rash    Aspirin Other (comments) and Nausea and Vomiting     G6PD  GI BLEED AND ULCER    Contrast Agent [Iodine] Rash     Allergic to CT Dye    Dilantin [Phenytoin Sodium Extended] Other (comments)     Difficulty waking    Iodinated Contrast- Oral And Iv Dye Rash     \"bumps on face\"    Lipitor [Atorvastatin] Other (comments)     PKU     Pcn [Penicillins] Rash and Hives     \"sores all over the body\"    Phenytoin Unknown (comments)     \"Trouble waking up\"    Sulfa (Sulfonamide Antibiotics) Rash and Nausea and Vomiting     G6PD  G6PD    Tegretol [Carbamazepine] Other (comments) and Vertigo     \"Trouble waking up\"  Difficulty waking up         Review of Systems   Constitutional: Positive for malaise/fatigue. Negative for chills and fever. HENT: Positive for sore throat (off and on). Thick post nasal drainage looks like \"peanut butter\", chokes with it   Eyes: Positive for discharge and redness. Negative for blurred vision and pain. Eyelids crusted shut in AM   Respiratory: Negative for cough, shortness of breath and wheezing. Cardiovascular: Negative for chest pain and palpitations. Neurological: Positive for headaches (frontal). Endo/Heme/Allergies: Positive for environmental allergies. Visit Vitals    /86 (BP 1 Location: Left arm, BP Patient Position: Sitting)    Pulse 73    Temp 98.7 °F (37.1 °C) (Oral)    Resp 22    Ht 5' 2\" (1.575 m)    Wt 165 lb 9.6 oz (75.1 kg)    LMP 08/28/2002    SpO2 98%    BMI 30.29 kg/m2     Physical Exam   Constitutional: She is oriented to person, place, and time. She appears well-developed and well-nourished. HENT:   Head: Normocephalic. Right Ear: Tympanic membrane and ear canal normal.   Left Ear: Tympanic membrane and ear canal normal.   Nose: Right sinus exhibits no maxillary sinus tenderness and no frontal sinus tenderness. Left sinus exhibits no maxillary sinus tenderness and no frontal sinus tenderness. Mouth/Throat: Oropharynx is clear and moist.   Eyes: Conjunctivae and EOM are normal.   Neck: Neck supple. Cardiovascular: Normal rate, regular rhythm and normal heart sounds. Pulmonary/Chest: Effort normal and breath sounds normal.   Lymphadenopathy:     She has no cervical adenopathy. Neurological: She is alert and oriented to person, place, and time. Skin: Skin is warm and dry. Psychiatric: She has a normal mood and affect. Her behavior is normal.   Nursing note and vitals reviewed. ASSESSMENT and PLAN    ICD-10-CM ICD-9-CM    1. Chronic seasonal allergic rhinitis due to pollen J30.1 477.0 methylPREDNISolone (MEDROL DOSEPACK) 4 mg tablet   2. Purulent rhinitis J31.0 472.0 doxycycline (VIBRAMYCIN) 100 mg capsule   3. Conjunctivitis of both eyes, unspecified conjunctivitis type H10.9 372.30 tobramycin (TOBREX) 0.3 % ophthalmic solution   Doxycycline 100 mg - Take 1 capsule twice daily with food for 10 days, remain upright for 45 minutes after each dose. Medrol dose pack - Follow dose pack instructions  OTC guaifenesin (Mucinex)  Apply warm wet compresses to eyes frequently. Complete prescribed course of antibiotics eye drops. See patient instructions. Follow up for new symptoms, worsening symptoms or failure to improve.

## 2017-11-28 NOTE — MR AVS SNAPSHOT
Visit Information Date & Time Provider Department Dept. Phone Encounter #  
 11/28/2017  2:00 PM Luana Dhillon, Lyndsay Li  [phone] [id number] Your Appointments 12/6/2017 12:55 PM  
Follow Up with Sujata Gillis MD  
[address] [place] [address] for Pain Management [facility] Appt Note: With GS. PLEASE SCHEDULE TWO APPOINTMENT SLOTS--FOR PATIENT AS WELL AS !!!  
 [facility] [id number]  
[phone]  
  
    
 12/6/2017  1:15 PM  
Follow Up with Sujata Gillis MD  
[address] [place] [address] for Pain Management [facility]) Appt Note: With GS. PLEASE SCHEDULE TWO APPOINTMENT SLOTS--FOR PATIENT AS WELL AS !!!  
 [address] [facility] [id number]  
[phone] Upcoming Health Maintenance Date Due Pneumococcal 19-64 Highest Risk (1 of 3 - PCV13) 12/23/1976 BREAST CANCER SCRN MAMMOGRAM 10/29/2015 Influenza Age 5 to Adult 8/1/2017 ZOSTER VACCINE AGE 60> 10/23/2017 PAP AKA CERVICAL CYTOLOGY 12/13/2019 COLONOSCOPY 3/15/2023 DTaP/Tdap/Td series (2 - Td) 4/1/2025 Allergies as of 11/28/2017  Review Complete On: 11/28/2017 By: Luana Dhillon MD  
  
 Severity Noted Reaction Type Reactions Adhesive    Rash Aspirin  06/20/2011    Other (comments), Nausea and Vomiting G6PD 
GI BLEED AND ULCER Contrast Agent [Iodine]    Rash Allergic to CT Dye  
 Dilantin [Phenytoin Sodium Extended]    Other (comments) Difficulty waking Iodinated Contrast- Oral And Iv Dye  05/03/2016    Rash \"bumps on face\" Lipitor [Atorvastatin]  12/13/2016    Other (comments) PKU Pcn [Penicillins]    Rash, Hives \"sores all over the body\" Phenytoin  05/03/2016    Unknown (comments) \"Trouble waking up\" Sulfa (Sulfonamide Antibiotics)    Rash, Nausea and Vomiting  G6PD 
G6PD  
 Tegretol [Carbamazepine]    Other (comments), Vertigo \"Trouble waking up\" Difficulty waking up Current Immunizations  Reviewed on 11/25/2015 Name Date Influenza Vaccine 11/25/2015  7:48 AM  
 Tdap 4/1/2015 Not reviewed this visit You Were Diagnosed With   
  
 Codes Comments Chronic seasonal allergic rhinitis due to pollen    -  Primary ICD-10-CM: J30.1 ICD-9-CM: 477.0 Purulent rhinitis     ICD-10-CM: J31.0 ICD-9-CM: 472.0 Conjunctivitis of both eyes, unspecified conjunctivitis type     ICD-10-CM: H10.9 ICD-9-CM: 372.30 Vitals BP Pulse Temp Resp Height(growth percentile) Weight(growth percentile) 142/86 (BP 1 Location: Left arm, BP Patient Position: Sitting) 73 98.7 °F (37.1 °C) (Oral) 22 5' 2\" (1.575 m) 165 lb 9.6 oz (75.1 kg) LMP SpO2 BMI OB Status Smoking Status 08/28/2002 98% 30.29 kg/m2 Postmenopausal Never Smoker BMI and BSA Data Body Mass Index Body Surface Area  
 30.29 kg/m 2 1.81 m 2 Preferred Pharmacy Pharmacy Name Phone [address] [facility] rodneyAga Lisajudy Mahi [phone] Your Updated Medication List  
  
   
This list is accurate as of: 11/28/17  2:25 PM.  Always use your most recent med list.  
  
  
  
  
 acetaminophen 500 mg tablet Commonly known as:  TYLENOL Take  by mouth every six (6) hours as needed for Pain. CLARITIN 10 mg tablet Generic drug:  loratadine Take 10 mg by mouth daily as needed. diclofenac 3 % topical gel Commonly known as:  Cosme Old Apply 2-4 g to affected area two (2) times a day. As needed for joint pain. Apply to painful areas  
  
 doxycycline 100 mg capsule Commonly known as:  VIBRAMYCIN Take 1 capsule twice daily with food for 10 days, remain upright for 45 minutes after each dose. * fentaNYL 37.5 mcg/hour Pt72  
1 Patch by TransDERmal route every fourty-eight (48) hours. Max Daily Amount: 1 Patch. * fentaNYL 37.5 mcg/hour Pt72  
1 Patch by TransDERmal route every fourty-eight (48) hours. Max Daily Amount: 1 Patch. for chronic, severe, refractory pain. Mylan, Sandoz, or [place] brands preferred  
  
 levothyroxine 100 mcg tablet Commonly known as:  SYNTHROID  
TAKE 1 TABLET BY MOUTH DAILY (BEFORE BREAKFAST) lidocaine 5 % ointment Commonly known as:  XYLOCAINE Apply 1-2 g to affected area as needed for Pain. To painful areas  
  
 methocarbamol 750 mg tablet Commonly known as:  ROBAXIN  
TAKE 1 TAB BY MOUTH THREE (3) TIMES DAILY. AS NEEDED FOR MUSCLE SPASMS  
  
 methylPREDNISolone 4 mg tablet Commonly known as:  Gregory Ang Follow dose pack instructions NASONEX 50 mcg/actuation nasal spray Generic drug:  mometasone 2 Sprays daily as needed. tobramycin 0.3 % ophthalmic solution Commonly known as:  Adenike Grams Administer 1 Drop to both eyes every six (6) hours for 10 days. traMADol 50 mg tablet Commonly known as:  ULTRAM  
Take 2 Tabs by mouth three (3) times daily as needed for Pain. Max Daily Amount: 300 mg.  
  
 traZODone 50 mg tablet Commonly known as:  DESYREL  
TAKE 2 TABS BY MOUTH NIGHTLY. AS NEEDED FOR INSOMNIA  
  
 ZANTAC 150 mg tablet Generic drug:  raNITIdine Take 150 mg by mouth daily as needed. * Notice: This list has 2 medication(s) that are the same as other medications prescribed for you. Read the directions carefully, and ask your doctor or other care provider to review them with you. Prescriptions Sent to Pharmacy Refills  
 doxycycline (VIBRAMYCIN) 100 mg capsule 0 Sig: Take 1 capsule twice daily with food for 10 days, remain upright for 45 minutes after each dose. Class: Normal  
 Pharmacy: [address] [place] [address] Bimal Crone Ph #: [phone]  
 methylPREDNISolone (MEDROL DOSEPACK) 4 mg tablet 0 Sig: Follow dose pack instructions  Class: Normal  
 Pharmacy: [address] [place] [address], [address] Ranjana Johnson Ph #: [phone]  
 tobramycin (TOBREX) 0.3 % ophthalmic solution 0 Sig: Administer 1 Drop to both eyes every six (6) hours for 10 days. Class: Normal  
 Pharmacy: [address] [place] [address], [address] Teddy Laguerre Ph #: [phone] Route: Both Eyes Patient Instructions Doxycycline 100 mg - Take 1 capsule twice daily with food for 10 days, remain upright for 45 minutes after each dose. Medrol dose pack - Follow dose pack instructions Apply warm wet compresses to eyes frequently. Complete prescribed course of antibiotics eye drops. See patient instructions. Follow up for new symptoms, worsening symptoms or failure to improve. Pinkeye: Care Instructions Your Care Instructions Pinkeye is redness and swelling of the eye surface and the conjunctiva (the lining of the eyelid and the covering of the white part of the eye). Pinkeye is also called conjunctivitis. Pinkeye is often caused by infection with bacteria or a virus. Dry air, allergies, smoke, and chemicals are other common causes. Pinkeye often clears on its own in 7 to 10 days. Antibiotics only help if the pinkeye is caused by bacteria. Pinkeye caused by infection spreads easily. If an allergy or chemical is causing pinkeye, it will not go away unless you can avoid whatever is causing it. Follow-up care is a key part of your treatment and safety. Be sure to make and go to all appointments, and call your doctor if you are having problems. It's also a good idea to know your test results and keep a list of the medicines you take. How can you care for yourself at home? · Wash your hands often. Always wash them before and after you treat pinkeye or touch your eyes or face. · Use moist cotton or a clean, wet cloth to remove crust. Wipe from the inside corner of the eye to the outside.  Use a clean part of the cloth for each wipe. · Put cold or warm wet cloths on your eye a few times a day if the eye hurts. · Do not wear contact lenses or eye makeup until the pinkeye is gone. Throw away any eye makeup you were using when you got pinkeye. Clean your contacts and storage case. If you wear disposable contacts, use a new pair when your eye has cleared and it is safe to wear contacts again. · If the doctor gave you antibiotic ointment or eyedrops, use them as directed. Use the medicine for as long as instructed, even if your eye starts looking better soon. Keep the bottle tip clean, and do not let it touch the eye area. · To put in eyedrops or ointment: ¨ Tilt your head back, and pull your lower eyelid down with one finger. ¨ Drop or squirt the medicine inside the lower lid. ¨ Close your eye for 30 to 60 seconds to let the drops or ointment move around. ¨ Do not touch the ointment or dropper tip to your eyelashes or any other surface. · Do not share towels, pillows, or washcloths while you have pinkeye. When should you call for help? Call your doctor now or seek immediate medical care if: 
? · You have pain in your eye, not just irritation on the surface. ? · You have a change in vision or loss of vision. ? · You have an increase in discharge from the eye.  
? · Your eye has not started to improve or begins to get worse within 48 hours after you start using antibiotics. ? · Pinkeye lasts longer than 7 days. ? Watch closely for changes in your health, and be sure to contact your doctor if you have any problems. Where can you learn more? Go to http://celia-staci.info/. Enter Y392 in the search box to learn more about \"Pinkeye: Care Instructions. \" Current as of: March 20, 2017 Content Version: 11.4 © 5932-7211 [email or web address].  Care instructions adapted under license by [employer] (which disclaims liability or warranty for this information). If you have questions about a medical condition or this instruction, always ask your healthcare professional. [address] any warranty or liability for your use of this information. Introducing [facility] SERVICES! Marcella Avila introduces [employer] patient portal. Now you can access parts of your medical record, email your doctor's office, and request medication refills online. 1. In your internet browser, go to [email or web address] 2. Click on the First Time User? Click Here link in the Sign In box. You will see the New Member Sign Up page. 3. Enter your [employer] Access Code exactly as it appears below. You will not need to use this code after youve completed the sign-up process. If you do not sign up before the expiration date, you must request a new code. · [employer] Access Code: [email or web address] Expires: 12/11/2017  3:10 PM 
 
4. Enter the last four digits of your Social Security Number (xxxx) and Date of Birth (mm/dd/yyyy) as indicated and click Submit. You will be taken to the next sign-up page. 5. Create a [employer] ID. This will be your [employer] login ID and cannot be changed, so think of one that is secure and easy to remember. 6. Create a [employer] password. You can change your password at any time. 7. Enter your Password Reset Question and Answer. This can be used at a later time if you forget your password. 8. Enter your e-mail address. You will receive e-mail notification when new information is available in [address] 9. Click Sign Up. You can now view and download portions of your medical record. 10. Click the Download Summary menu link to download a portable copy of your medical information. If you have questions, please visit the Frequently Asked Questions section of the [employer] website. Remember, [employer] is NOT to be used for urgent needs. For medical emergencies, dial 911. Now available from your iPhone and Android! Please provide this summary of care documentation to your next provider. Your primary care clinician is listed as Mahesh 51. If you have any questions after today's visit, please call [phone].

## 2017-11-28 NOTE — PATIENT INSTRUCTIONS
Doxycycline 100 mg - Take 1 capsule twice daily with food for 10 days, remain upright for 45 minutes after each dose. Medrol dose pack - Follow dose pack instructions  Apply warm wet compresses to eyes frequently. Complete prescribed course of antibiotics eye drops. See patient instructions. Follow up for new symptoms, worsening symptoms or failure to improve. Pinkeye: Care Instructions  Your Care Instructions    Pinkeye is redness and swelling of the eye surface and the conjunctiva (the lining of the eyelid and the covering of the white part of the eye). Pinkeye is also called conjunctivitis. Pinkeye is often caused by infection with bacteria or a virus. Dry air, allergies, smoke, and chemicals are other common causes. Pinkeye often clears on its own in 7 to 10 days. Antibiotics only help if the pinkeye is caused by bacteria. Pinkeye caused by infection spreads easily. If an allergy or chemical is causing pinkeye, it will not go away unless you can avoid whatever is causing it. Follow-up care is a key part of your treatment and safety. Be sure to make and go to all appointments, and call your doctor if you are having problems. It's also a good idea to know your test results and keep a list of the medicines you take. How can you care for yourself at home? · Wash your hands often. Always wash them before and after you treat pinkeye or touch your eyes or face. · Use moist cotton or a clean, wet cloth to remove crust. Wipe from the inside corner of the eye to the outside. Use a clean part of the cloth for each wipe. · Put cold or warm wet cloths on your eye a few times a day if the eye hurts. · Do not wear contact lenses or eye makeup until the pinkeye is gone. Throw away any eye makeup you were using when you got pinkeye. Clean your contacts and storage case. If you wear disposable contacts, use a new pair when your eye has cleared and it is safe to wear contacts again.   · If the doctor gave you antibiotic ointment or eyedrops, use them as directed. Use the medicine for as long as instructed, even if your eye starts looking better soon. Keep the bottle tip clean, and do not let it touch the eye area. · To put in eyedrops or ointment:  ¨ Tilt your head back, and pull your lower eyelid down with one finger. ¨ Drop or squirt the medicine inside the lower lid. ¨ Close your eye for 30 to 60 seconds to let the drops or ointment move around. ¨ Do not touch the ointment or dropper tip to your eyelashes or any other surface. · Do not share towels, pillows, or washcloths while you have pinkeye. When should you call for help? Call your doctor now or seek immediate medical care if:  ? · You have pain in your eye, not just irritation on the surface. ? · You have a change in vision or loss of vision. ? · You have an increase in discharge from the eye.   ? · Your eye has not started to improve or begins to get worse within 48 hours after you start using antibiotics. ? · Pinkeye lasts longer than 7 days. ? Watch closely for changes in your health, and be sure to contact your doctor if you have any problems. Where can you learn more? Go to http://celia-staci.info/. Enter Y392 in the search box to learn more about \"Pinkeye: Care Instructions. \"  Current as of: March 20, 2017  Content Version: 11.4  © 9025-7292 [employer]. Care instructions adapted under license by [employer] (which disclaims liability or warranty for this information). If you have questions about a medical condition or this instruction, always ask your healthcare professional. [place] [address] any warranty or liability for your use of this information.

## 2017-11-28 NOTE — PROGRESS NOTES
Alejandra Tabares is a 63 y.o. female here for cold symptoms      Alejandra Tabares is a 63 y.o. female (: 1955) presenting to address:    Chief Complaint   Patient presents with    Cold Symptoms     pt states for 6 weeks. [facility] reports discharge from both eye, congestion, fatigue dizzy        Vitals:    17 1358   BP: 142/86   Pulse: 73   Resp: 22   Temp: 98.7 °F (37.1 °C)   TempSrc: Oral   SpO2: 98%   Weight: 165 lb 9.6 oz (75.1 kg)   Height: 5' 2\" (1.575 m)   PainSc:   5   PainLoc: Generalized   LMP: 2002       Hearing/Vision:   No exam data present    Learning Assessment:     Learning Assessment 10/9/2017   PRIMARY LEARNER Patient   HIGHEST LEVEL OF EDUCATION - PRIMARY LEARNER  -   BARRIERS PRIMARY LEARNER -   CO-LEARNER CAREGIVER -   PRIMARY LANGUAGE ENGLISH   LEARNER PREFERENCE PRIMARY READING     LISTENING     PICTURES     VIDEOS     DEMONSTRATION   ANSWERED BY self   RELATIONSHIP SELF     Depression Screening:     PHQ over the last two weeks 3/27/2017   Little interest or pleasure in doing things Nearly every day   Feeling down, depressed or hopeless More than half the days   Total Score PHQ 2 5     Fall Risk Assessment:     Fall Risk Assessment, last 12 mths 2014   Able to walk? Yes   Fall in past 12 months? No     Abuse Screening:     Abuse Screening Questionnaire 10/9/2017   Do you ever feel afraid of your partner? N   Are you in a relationship with someone who physically or mentally threatens you? N   Is it safe for you to go home? Y     Coordination of Care Questionaire:   1. Have you been to the ER, urgent care clinic since your last visit? Hospitalized since your last visit? NO    2. Have you seen or consulted any other health care providers outside of the [facility] since your last visit? Include any pap smears or colon screening. NO    Advanced Directive:   1. Do you have an Advanced Directive? NO    2.  Would you like information on Advanced Directives?  NO

## 2017-12-06 ENCOUNTER — OFFICE VISIT (OUTPATIENT)
Dept: PAIN MANAGEMENT | Age: 62
End: 2017-12-06

## 2017-12-06 VITALS
SYSTOLIC BLOOD PRESSURE: 142 MMHG | DIASTOLIC BLOOD PRESSURE: 78 MMHG | HEIGHT: 62 IN | WEIGHT: 165 LBS | TEMPERATURE: 98.6 F | RESPIRATION RATE: 15 BRPM | BODY MASS INDEX: 30.36 KG/M2 | HEART RATE: 86 BPM

## 2017-12-06 DIAGNOSIS — M54.41 CHRONIC MIDLINE LOW BACK PAIN WITH RIGHT-SIDED SCIATICA: Primary | ICD-10-CM

## 2017-12-06 DIAGNOSIS — M51.37 DEGENERATION OF LUMBAR OR LUMBOSACRAL INTERVERTEBRAL DISC: ICD-10-CM

## 2017-12-06 DIAGNOSIS — M96.1 LUMBAR POST-LAMINECTOMY SYNDROME: ICD-10-CM

## 2017-12-06 DIAGNOSIS — M54.16 LUMBAR NEURITIS: Primary | ICD-10-CM

## 2017-12-06 DIAGNOSIS — M48.061 FORAMINAL STENOSIS OF LUMBAR REGION: ICD-10-CM

## 2017-12-06 DIAGNOSIS — M47.816 LUMBAR FACET ARTHROPATHY: ICD-10-CM

## 2017-12-06 DIAGNOSIS — M96.1 POSTLAMINECTOMY SYNDROME, LUMBAR REGION: ICD-10-CM

## 2017-12-06 DIAGNOSIS — G89.29 CHRONIC MIDLINE LOW BACK PAIN WITH RIGHT-SIDED SCIATICA: Primary | ICD-10-CM

## 2017-12-06 LAB
ALCOHOL UR POC: NORMAL
AMPHETAMINES UR POC: NEGATIVE
BARBITURATES UR POC: NEGATIVE
BENZODIAZEPINES UR POC: NEGATIVE
BUPRENORPHINE UR POC: NEGATIVE
CANNABINOIDS UR POC: NEGATIVE
CARISOPRODOL UR POC: NORMAL
COCAINE UR POC: NEGATIVE
FENTANYL UR POC: NORMAL
MDMA/ECSTASY UR POC: NEGATIVE
METHADONE UR POC: NEGATIVE
METHAMPHETAMINE UR POC: NEGATIVE
METHYLPHENIDATE UR POC: NEGATIVE
OPIATES UR POC: NEGATIVE
OXYCODONE UR POC: NEGATIVE
PHENCYCLIDINE UR POC: NORMAL
PROPOXYPHENE UR POC: NORMAL
TRAMADOL UR POC: NORMAL
TRICYCLICS UR POC: NEGATIVE

## 2017-12-06 RX ORDER — FENTANYL 37.5 UG/H
1 PATCH, EXTENDED RELEASE TRANSDERMAL
Qty: 15 PATCH | Refills: 0 | Status: SHIPPED | OUTPATIENT
Start: 2018-02-04 | End: 2018-03-05 | Stop reason: SDUPTHER

## 2017-12-06 RX ORDER — TRAMADOL HYDROCHLORIDE 50 MG/1
100 TABLET ORAL
Qty: 180 TAB | Refills: 2 | Status: SHIPPED | OUTPATIENT
Start: 2017-12-06 | End: 2018-03-05 | Stop reason: SDUPTHER

## 2017-12-06 RX ORDER — FENTANYL 37.5 UG/H
1 PATCH, EXTENDED RELEASE TRANSDERMAL
Qty: 15 PATCH | Refills: 0 | Status: SHIPPED | OUTPATIENT
Start: 2017-12-06 | End: 2018-03-05 | Stop reason: SDUPTHER

## 2017-12-06 RX ORDER — FENTANYL 37.5 UG/H
1 PATCH, EXTENDED RELEASE TRANSDERMAL
Qty: 15 PATCH | Refills: 0 | Status: SHIPPED | OUTPATIENT
Start: 2018-01-05 | End: 2018-03-05 | Stop reason: SDUPTHER

## 2017-12-06 RX ORDER — DICLOFENAC SODIUM 30 MG/G
2-4 GEL TOPICAL 2 TIMES DAILY
Qty: 100 G | Refills: 5 | Status: SHIPPED | OUTPATIENT
Start: 2017-12-06 | End: 2018-08-29 | Stop reason: SDUPTHER

## 2017-12-06 RX ORDER — METHOCARBAMOL 750 MG/1
TABLET, FILM COATED ORAL
Qty: 90 TAB | Refills: 2 | Status: SHIPPED | OUTPATIENT
Start: 2017-12-06 | End: 2018-06-05 | Stop reason: SDUPTHER

## 2017-12-06 RX ORDER — ONDANSETRON 8 MG/1
8 TABLET, ORALLY DISINTEGRATING ORAL
Qty: 30 TAB | Refills: 2 | Status: SHIPPED | OUTPATIENT
Start: 2017-12-06 | End: 2018-03-05 | Stop reason: SDUPTHER

## 2017-12-06 NOTE — PATIENT INSTRUCTIONS
Chronic Pain: Care Instructions  Your Care Instructions    Chronic pain is pain that lasts a long time (months or even years) and may or may not have a clear cause. It is different from acute pain, which usually does have a clear cause-like an injury or illness-and gets better over time. Chronic pain:  · Lasts over time but may vary from day to day. · Does not go away despite efforts to end it. · May disrupt your sleep and lead to fatigue. · May cause depression or anxiety. · May make your muscles tense, causing more pain. · Can disrupt your work, hobbies, home life, and relationships with friends and family. Chronic pain is a very real condition. It is not just in your head. Treatment can help and usually includes several methods used together, such as medicines, physical therapy, exercise, and other treatments. Learning how to relax and changing negative thought patterns can also help you cope. Chronic pain is complex. Taking an active role in your treatment will help you better manage your pain. Tell your doctor if you have trouble dealing with your pain. You may have to try several things before you find what works best for you. Follow-up care is a key part of your treatment and safety. Be sure to make and go to all appointments, and call your doctor if you are having problems. It's also a good idea to know your test results and keep a list of the medicines you take. How can you care for yourself at home? · Pace yourself. Break up large jobs into smaller tasks. Save harder tasks for days when you have less pain, or go back and forth between hard tasks and easier ones. Take rest breaks. · Relax, and reduce stress. Relaxation techniques such as deep breathing or meditation can help. · Keep moving. Gentle, daily exercise can help reduce pain over the long run. Try low- or no-impact exercises such as walking, swimming, and stationary biking. Do stretches to stay flexible.   · Try heat, cold packs, and massage. · Get enough sleep. Chronic pain can make you tired and drain your energy. Talk with your doctor if you have trouble sleeping because of pain. · Think positive. Your thoughts can affect your pain level. Do things that you enjoy to distract yourself when you have pain instead of focusing on the pain. See a movie, read a book, listen to music, or spend time with a friend. · If you think you are depressed, talk to your doctor about treatment. · Keep a daily pain diary. Record how your moods, thoughts, sleep patterns, activities, and medicine affect your pain. You may find that your pain is worse during or after certain activities or when you are feeling a certain emotion. Having a record of your pain can help you and your doctor find the best ways to treat your pain. · Take pain medicines exactly as directed. ¨ If the doctor gave you a prescription medicine for pain, take it as prescribed. ¨ If you are not taking a prescription pain medicine, ask your doctor if you can take an over-the-counter medicine. Reducing constipation caused by pain medicine  · Include fruits, vegetables, beans, and whole grains in your diet each day. These foods are high in fiber. · Drink plenty of fluids, enough so that your urine is light yellow or clear like water. If you have kidney, heart, or liver disease and have to limit fluids, talk with your doctor before you increase the amount of fluids you drink. · If your doctor recommends it, get more exercise. Walking is a good choice. Bit by bit, increase the amount you walk every day. Try for at least 30 minutes on most days of the week. · Schedule time each day for a bowel movement. A daily routine may help. Take your time and do not strain when having a bowel movement. When should you call for help? Call your doctor now or seek immediate medical care if:  ? · Your pain gets worse or is out of control.    ? · You feel down or blue, or you do not enjoy things like you once did. You may be depressed, which is common in people with chronic pain. Depression can be treated. ? · You have vomiting or cramps for more than 2 hours. ? Watch closely for changes in your health, and be sure to contact your doctor if:  ? · You cannot sleep because of pain. ? · You are very worried or anxious about your pain. ? · You have trouble taking your pain medicine. ? · You have any concerns about your pain medicine. ? · You have trouble with bowel movements, such as:  ¨ No bowel movement in 3 days. ¨ Blood in the anal area, in your stool, or on the toilet paper. ¨ Diarrhea for more than 24 hours. Where can you learn more? Go to http://celia-staci.info/. Enter N004 in the search box to learn more about \"Chronic Pain: Care Instructions. \"  Current as of: October 14, 2016  Content Version: 11.4  © 3295-6988 [employer]. Care instructions adapted under license by [employer] (which disclaims liability or warranty for this information). If you have questions about a medical condition or this instruction, always ask your healthcare professional. [place] [address] any warranty or liability for your use of this information. Learning About Opioids  Introduction    Opioids are medicines used to relieve moderate to severe pain. They may be used for a short time for pain, such as after surgery. Or in some cases a doctor might prescribe them for long-term pain. They don't cure a health problem. But they help you manage the pain. Opioids relieve pain by changing the way your body feels pain and the way you feel about pain. Sometimes opioids are used for people who can't take other pain medicines. They may be prescribed if you have heart, kidney, or liver problems. For instance, you may take an opioid instead of nonsteroidal anti-inflammatory drugs (NSAIDs). NSAIDs include ibuprofen (Advil, Motrin) and naproxen (Aleve).   Opioids are strong medicines. They can help you manage pain when you use them the right way. But if you misuse them, they can cause serious harm and even death. If you decide to take opioids, here are some things to remember. · Keep your doctor informed. You can get addicted to opioids. The risk is higher if you have a history of substance use. Your doctor will monitor you closely for signs of misuse and addiction and to figure out when you no longer need to take opioids. · Make a treatment plan. The goal of your plan is to be able to function and do the things you need to do, even if you still have some pain. You might be able to manage your pain with other non-opioid options like physical therapy, relaxation, or over-the-counter pain medicines. · Be aware of the side effects. Opioids can cause serious side effects, such as constipation, dry mouth, and nausea. And over time, you may need a higher dose to get pain relief. This is called tolerance. Your body also gets used to opioids. This is called physical dependence. If you suddenly stop taking them, you may have withdrawal symptoms. Examples  Opioids or other medicines that contain them include:  · Codeine (Tylenol 3). · Hydrocodone (Norco). · Oxycodone (OxyContin, Percocet). Safety tips  If you need to take opioids to manage your pain, remember these safety tips. · Follow directions carefully. It's easy to misuse opioids if you take a dose other than what's prescribed by your doctor. This can lead to overdose and even death. Even sharing them with someone they weren't meant for is misuse. · Be cautious. Opioids may affect your judgment and decision making. Do not drive or operate machinery until you can think clearly. Talk with your doctor about when it is safe to drive. · Reduce the risk of drug interactions. Opioids can be dangerous if you take them with alcohol or with certain drugs like sleeping pills and muscle relaxers.  Make sure your doctor knows about all the other medicines you take, including over-the-counter medicines. Don't start any new medicines before you talk to your doctor or pharmacist.  · Keep others safe. Store opioids in a safe and secure place. Make sure that pets, children, friends, and family can't get to them. When you're done using opioids, make sure to properly dispose of them. You can either use a community drug take-back program or your drugstore's mail-back program. If one of these programs isn't available, you can flush opioid skin patches and unused opioid pills down the toilet. · Reduce the risk of overdose. Misuse of opioids can be very dangerous. Protect yourself by asking your doctor about a naloxone rescue kit. It can help you-and even save your life-if you take too much of an opioid. Side effects  Common side effects include:  · Constipation. · Feeling dizzy or lightheaded. You may feel like you might faint. · Feeling sleepy. · Nausea or vomiting. You may have other side effects or reactions. Check the information that comes with your medicine. When should you call for help? Call 911 anytime you think you may need emergency care. For example, call if:  · You have symptoms of a severe allergic reaction. These may include:  ¨ Sudden raised, red areas (hives) all over your body. ¨ Swelling of the throat, mouth, lips, or tongue. ¨ Trouble breathing. ¨ Passing out (losing consciousness). Or you may feel very lightheaded or suddenly feel weak, confused, or restless. · You have signs of an overdose. These include:  ¨ Cold, clammy skin. ¨ Confusion. ¨ Severe nervousness or restlessness. ¨ Severe dizziness, drowsiness, or weakness. ¨ Slow breathing. ¨ Seizures. Call your doctor now or seek immediate medical care if:  · You have symptoms of an allergic reaction, such as:  ¨ A rash or hives (raised, red areas on the skin). ¨ Itching. ¨ Swelling. ¨ Belly pain, nausea, or vomiting.   Watch closely for changes in your health, and be sure to contact your doctor if:  · Your medicine is not helping with the pain. · You are having side effects, such as constipation. Where can you learn more? Go to http://celia-staci.info/. Enter O696 in the search box to learn more about \"Learning About Opioids. \"  Current as of: October 14, 2016  Content Version: 11.4  © 9169-0545 [employer]. Care instructions adapted under license by [employer] (which disclaims liability or warranty for this information). If you have questions about a medical condition or this instruction, always ask your healthcare professional. [place] [address] any warranty or liability for your use of this information.

## 2017-12-06 NOTE — MR AVS SNAPSHOT
Visit Information Date & Time Provider Department Dept. Phone Encounter #  
 12/6/2017 12:55 PM Kerry Montiel MD [address] [place] [address] Pain Management [phone] Follow-up Instructions Return in about 3 months (around 3/6/2018). Upcoming Health Maintenance Date Due Pneumococcal 19-64 Highest Risk (1 of 3 - PCV13) 12/23/1976 BREAST CANCER SCRN MAMMOGRAM 10/29/2015 Influenza Age 5 to Adult 8/1/2017 ZOSTER VACCINE AGE 60> 10/23/2017 PAP AKA CERVICAL CYTOLOGY 12/13/2019 COLONOSCOPY 3/15/2023 DTaP/Tdap/Td series (2 - Td) 4/1/2025 Allergies as of 12/6/2017  Review Complete On: 12/6/2017 By: Kerry Montiel MD  
  
 Severity Noted Reaction Type Reactions Adhesive    Rash Aspirin  06/20/2011    Other (comments), Nausea and Vomiting G6PD 
GI BLEED AND ULCER Contrast Agent [Iodine]    Rash Allergic to CT Dye  
 Dilantin [Phenytoin Sodium Extended]    Other (comments) Difficulty waking Iodinated Contrast- Oral And Iv Dye  05/03/2016    Rash \"bumps on face\" Lipitor [Atorvastatin]  12/13/2016    Other (comments) PKU Pcn [Penicillins]    Rash, Hives \"sores all over the body\" Phenytoin  05/03/2016    Unknown (comments) \"Trouble waking up\" Sulfa (Sulfonamide Antibiotics)    Rash, Nausea and Vomiting G6PD 
G6PD Tegretol [Carbamazepine]    Other (comments), Vertigo \"Trouble waking up\" Difficulty waking up Current Immunizations  Reviewed on 11/25/2015 Name Date Influenza Vaccine 11/25/2015  7:48 AM  
 Tdap 4/1/2015 Not reviewed this visit Vitals BP Pulse Temp Resp Height(growth percentile) Weight(growth percentile) 142/78 (BP 1 Location: Left arm, BP Patient Position: Sitting) 86 98.6 °F (37 °C) (Oral) 15 5' 2\" (1.575 m) 165 lb (74.8 kg) LMP BMI OB Status Smoking Status 08/28/2002 30.18 kg/m2 Postmenopausal Never Smoker Vitals History BMI and BSA Data Body Mass Index Body Surface Area  
 30.18 kg/m 2 1.81 m 2 Preferred Pharmacy Pharmacy Name Phone [address] [facility] [address], [address] Allie Piper [phone] Your Updated Medication List  
  
   
This list is accurate as of: 12/6/17  1:51 PM.  Always use your most recent med list.  
  
  
  
  
 acetaminophen 500 mg tablet Commonly known as:  TYLENOL Take  by mouth every six (6) hours as needed for Pain. CLARITIN 10 mg tablet Generic drug:  loratadine Take 10 mg by mouth daily as needed. diclofenac 3 % topical gel Commonly known as:  Liban Bran Apply 2-4 g to affected area two (2) times a day. As needed for joint pain. Apply to painful areas  
  
 doxycycline 100 mg capsule Commonly known as:  VIBRAMYCIN Take 1 capsule twice daily with food for 10 days, remain upright for 45 minutes after each dose. * fentaNYL 37.5 mcg/hour Pt72  
1 Patch by TransDERmal route every fourty-eight (48) hours. Max Daily Amount: 1 Patch. * fentaNYL 37.5 mcg/hour Pt72  
1 Patch by TransDERmal route every fourty-eight (48) hours. Max Daily Amount: 1 Patch. for chronic, severe, refractory pain. Mylan, Sandoz, or [place]ro[place] brands preferred  
  
 levothyroxine 100 mcg tablet Commonly known as:  SYNTHROID  
TAKE 1 TABLET BY MOUTH DAILY (BEFORE BREAKFAST) lidocaine 5 % ointment Commonly known as:  XYLOCAINE Apply 1-2 g to affected area as needed for Pain. To painful areas  
  
 methocarbamol 750 mg tablet Commonly known as:  ROBAXIN  
TAKE 1 TAB BY MOUTH THREE (3) TIMES DAILY. AS NEEDED FOR MUSCLE SPASMS  
  
 NASONEX 50 mcg/actuation nasal spray Generic drug:  mometasone 2 Sprays daily as needed. tobramycin 0.3 % ophthalmic solution Commonly known as:  Mak Desir Administer 1 Drop to both eyes every six (6) hours for 10 days. traMADol 50 mg tablet Commonly known as:  Lilia Mccarthy  
 Take 2 Tabs by mouth three (3) times daily as needed for Pain. Max Daily Amount: 300 mg.  
  
 traZODone 50 mg tablet Commonly known as:  DESYREL  
TAKE 2 TABS BY MOUTH NIGHTLY. AS NEEDED FOR INSOMNIA  
  
 ZANTAC 150 mg tablet Generic drug:  raNITIdine Take 150 mg by mouth daily as needed. * Notice: This list has 2 medication(s) that are the same as other medications prescribed for you. Read the directions carefully, and ask your doctor or other care provider to review them with you. Follow-up Instructions Return in about 3 months (around 3/6/2018). Patient Instructions Chronic Pain: Care Instructions Your Care Instructions Chronic pain is pain that lasts a long time (months or even years) and may or may not have a clear cause. It is different from acute pain, which usually does have a clear cause-like an injury or illness-and gets better over time. Chronic pain: 
· Lasts over time but may vary from day to day. · Does not go away despite efforts to end it. · May disrupt your sleep and lead to fatigue. · May cause depression or anxiety. · May make your muscles tense, causing more pain. · Can disrupt your work, hobbies, home life, and relationships with friends and family. Chronic pain is a very real condition. It is not just in your head. Treatment can help and usually includes several methods used together, such as medicines, physical therapy, exercise, and other treatments. Learning how to relax and changing negative thought patterns can also help you cope. Chronic pain is complex. Taking an active role in your treatment will help you better manage your pain. Tell your doctor if you have trouble dealing with your pain. You may have to try several things before you find what works best for you. Follow-up care is a key part of your treatment and safety.  Be sure to make and go to all appointments, and call your doctor if you are having problems. It's also a good idea to know your test results and keep a list of the medicines you take. How can you care for yourself at home? · Pace yourself. Break up large jobs into smaller tasks. Save harder tasks for days when you have less pain, or go back and forth between hard tasks and easier ones. Take rest breaks. · Relax, and reduce stress. Relaxation techniques such as deep breathing or meditation can help. · Keep moving. Gentle, daily exercise can help reduce pain over the long run. Try low- or no-impact exercises such as walking, swimming, and stationary biking. Do stretches to stay flexible. · Try heat, cold packs, and massage. · Get enough sleep. Chronic pain can make you tired and drain your energy. Talk with your doctor if you have trouble sleeping because of pain. · Think positive. Your thoughts can affect your pain level. Do things that you enjoy to distract yourself when you have pain instead of focusing on the pain. See a movie, read a book, listen to music, or spend time with a friend. · If you think you are depressed, talk to your doctor about treatment. · Keep a daily pain diary. Record how your moods, thoughts, sleep patterns, activities, and medicine affect your pain. You may find that your pain is worse during or after certain activities or when you are feeling a certain emotion. Having a record of your pain can help you and your doctor find the best ways to treat your pain. · Take pain medicines exactly as directed. ¨ If the doctor gave you a prescription medicine for pain, take it as prescribed. ¨ If you are not taking a prescription pain medicine, ask your doctor if you can take an over-the-counter medicine. Reducing constipation caused by pain medicine · Include fruits, vegetables, beans, and whole grains in your diet each day. These foods are high in fiber.  
· Drink plenty of fluids, enough so that your urine is light yellow or clear like water. If you have kidney, heart, or liver disease and have to limit fluids, talk with your doctor before you increase the amount of fluids you drink. · If your doctor recommends it, get more exercise. Walking is a good choice. Bit by bit, increase the amount you walk every day. Try for at least 30 minutes on most days of the week. · Schedule time each day for a bowel movement. A daily routine may help. Take your time and do not strain when having a bowel movement. When should you call for help? Call your doctor now or seek immediate medical care if: 
? · Your pain gets worse or is out of control. ? · You feel down or blue, or you do not enjoy things like you once did. You may be depressed, which is common in people with chronic pain. Depression can be treated. ? · You have vomiting or cramps for more than 2 hours. ? Watch closely for changes in your health, and be sure to contact your doctor if: 
? · You cannot sleep because of pain. ? · You are very worried or anxious about your pain. ? · You have trouble taking your pain medicine. ? · You have any concerns about your pain medicine. ? · You have trouble with bowel movements, such as: 
¨ No bowel movement in 3 days. ¨ Blood in the anal area, in your stool, or on the toilet paper. ¨ Diarrhea for more than 24 hours. Where can you learn more? Go to http://celia-staci.info/. Enter N004 in the search box to learn more about \"Chronic Pain: Care Instructions. \" Current as of: October 14, 2016 Content Version: 11.4 © 6075-4667 [employer]. Care instructions adapted under license by [employer] (which disclaims liability or warranty for this information). If you have questions about a medical condition or this instruction, always ask your healthcare professional. [address] any warranty or liability for your use of this information. Learning About Opioids Introduction Opioids are medicines used to relieve moderate to severe pain. They may be used for a short time for pain, such as after surgery. Or in some cases a doctor might prescribe them for long-term pain. They don't cure a health problem. But they help you manage the pain. Opioids relieve pain by changing the way your body feels pain and the way you feel about pain. Sometimes opioids are used for people who can't take other pain medicines. They may be prescribed if you have heart, kidney, or liver problems. For instance, you may take an opioid instead of nonsteroidal anti-inflammatory drugs (NSAIDs). NSAIDs include ibuprofen (Advil, Motrin) and naproxen (Aleve). Opioids are strong medicines. They can help you manage pain when you use them the right way. But if you misuse them, they can cause serious harm and even death. If you decide to take opioids, here are some things to remember. · Keep your doctor informed. You can get addicted to opioids. The risk is higher if you have a history of substance use. Your doctor will monitor you closely for signs of misuse and addiction and to figure out when you no longer need to take opioids. · Make a treatment plan. The goal of your plan is to be able to function and do the things you need to do, even if you still have some pain. You might be able to manage your pain with other non-opioid options like physical therapy, relaxation, or over-the-counter pain medicines. · Be aware of the side effects. Opioids can cause serious side effects, such as constipation, dry mouth, and nausea. And over time, you may need a higher dose to get pain relief. This is called tolerance. Your body also gets used to opioids. This is called physical dependence. If you suddenly stop taking them, you may have withdrawal symptoms. Examples Opioids or other medicines that contain them include: · Codeine (Tylenol 3). · Hydrocodone (Norco). · Oxycodone (OxyContin, Percocet). Safety tips If you need to take opioids to manage your pain, remember these safety tips. · Follow directions carefully. It's easy to misuse opioids if you take a dose other than what's prescribed by your doctor. This can lead to overdose and even death. Even sharing them with someone they weren't meant for is misuse. · Be cautious. Opioids may affect your judgment and decision making. Do not drive or operate machinery until you can think clearly. Talk with your doctor about when it is safe to drive. · Reduce the risk of drug interactions. Opioids can be dangerous if you take them with alcohol or with certain drugs like sleeping pills and muscle relaxers. Make sure your doctor knows about all the other medicines you take, including over-the-counter medicines. Don't start any new medicines before you talk to your doctor or pharmacist. 
· Keep others safe. Store opioids in a safe and secure place. Make sure that pets, children, friends, and family can't get to them. When you're done using opioids, make sure to properly dispose of them. You can either use a community drug take-back program or your drugstore's mail-back program. If one of these programs isn't available, you can flush opioid skin patches and unused opioid pills down the toilet. · Reduce the risk of overdose. Misuse of opioids can be very dangerous. Protect yourself by asking your doctor about a naloxone rescue kit. It can help you-and even save your life-if you take too much of an opioid. Side effects Common side effects include: 
· Constipation. · Feeling dizzy or lightheaded. You may feel like you might faint. · Feeling sleepy. · Nausea or vomiting. You may have other side effects or reactions. Check the information that comes with your medicine. When should you call for help? Call 911 anytime you think you may need emergency care. For example, call if: 
· You have symptoms of a severe allergic reaction. These may include: ¨ Sudden raised, red areas (hives) all over your body. ¨ Swelling of the throat, mouth, lips, or tongue. ¨ Trouble breathing. ¨ Passing out (losing consciousness). Or you may feel very lightheaded or suddenly feel weak, confused, or restless. · You have signs of an overdose. These include: ¨ Cold, clammy skin. ¨ Confusion. ¨ Severe nervousness or restlessness. ¨ Severe dizziness, drowsiness, or weakness. ¨ Slow breathing. ¨ Seizures. Call your doctor now or seek immediate medical care if: 
· You have symptoms of an allergic reaction, such as: ¨ A rash or hives (raised, red areas on the skin). ¨ Itching. ¨ Swelling. ¨ Belly pain, nausea, or vomiting. Watch closely for changes in your health, and be sure to contact your doctor if: 
· Your medicine is not helping with the pain. · You are having side effects, such as constipation. Where can you learn more? Go to http://celia-staci.info/. Enter V921 in the search box to learn more about \"Learning About Opioids. \" Current as of: October 14, 2016 Content Version: 11.4 © 5137-2488 [employer]. Care instructions adapted under license by [employer] (which disclaims liability or warranty for this information). If you have questions about a medical condition or this instruction, always ask your healthcare professional. [place] [address] any warranty or liability for your use of this information. Introducing [facility]! [employer] introduces Likelii patient portal. Now you can access parts of your medical record, email your doctor's office, and request medication refills online. 1. In your internet browser, go to [email or web address] 2. Click on the First Time User? Click Here link in the Sign In box. You will see the New Member Sign Up page. 3. Enter your Likelii Access Code exactly as it appears below.  You will not need to use this code after youve completed the sign-up process. If you do not sign up before the expiration date, you must request a new code. · Moku Access Code: [email or web address] Expires: 12/11/2017  3:10 PM 
 
4. Enter the last four digits of your Social Security Number (xxxx) and Date of Birth (mm/dd/yyyy) as indicated and click Submit. You will be taken to the next sign-up page. 5. Create a Moku ID. This will be your Moku login ID and cannot be changed, so think of one that is secure and easy to remember. 6. Create a Moku password. You can change your password at any time. 7. Enter your Password Reset Question and Answer. This can be used at a later time if you forget your password. 8. Enter your e-mail address. You will receive e-mail notification when new information is available in [address]. 9. Click Sign Up. You can now view and download portions of your medical record. 10. Click the Download Summary menu link to download a portable copy of your medical information. If you have questions, please visit the Frequently Asked Questions section of the Moku website. Remember, Moku is NOT to be used for urgent needs. For medical emergencies, dial 911. Now available from your iPhone and Android! Please provide this summary of care documentation to your next provider. Your primary care clinician is listed as Mahesh 51. If you have any questions after today's visit, please call [phone].

## 2017-12-06 NOTE — PROGRESS NOTES
HISTORY OF PRESENT ILLNESS  Delroy Bunch is a 63 y.o. female. HPI Comments: Visit survey reviewed  Chief complaint chronic pain syndrome low back pain  80% complete relief in the past 30 days. The patient will continue with fentanyl patch 37.5 mcg every 48 hour  Tramadol 50 mg, up to 6 a day as needed  Robaxin 750 mg as needed  Zofran, diclofenac topical, lidocaine ointment topical, move antic, trazodone as needed for sleep. She informed me that she rarely uses trazodone and rarely uses lidocaine ointment and did not need new prescriptions for those medications. No new significant side effects reported  Medication continues to help improve quality of life. Medications reviewed including risk and benefits. Recent average level of pain5,6          Review of Systems   Constitutional: Positive for malaise/fatigue. Negative for chills, fever and weight loss. HENT: Positive for sore throat. Negative for congestion. Eyes: Negative for blurred vision and double vision. Respiratory: Positive for cough, shortness of breath and wheezing. Current URI, on antibiotics   Cardiovascular: Negative for chest pain and palpitations. Gastrointestinal: Positive for constipation ( treated by gastroenterologist ). Negative for abdominal pain, diarrhea, heartburn, nausea and vomiting. Genitourinary: Negative for dysuria, frequency and urgency. Musculoskeletal: Positive for back pain, joint pain and myalgias. Negative for neck pain. Neurological: Positive for headaches. Negative for dizziness, seizures and loss of consciousness. Endo/Heme/Allergies: Does not bruise/bleed easily. Psychiatric/Behavioral: Negative for depression and suicidal ideas. The patient has insomnia. The patient is not nervous/anxious. All other systems reviewed and are negative. Physical Exam   Constitutional: She appears well-developed and well-nourished. She is cooperative. She does not have a sickly appearance. HENT:   Head: Normocephalic and atraumatic. Right Ear: External ear normal. No drainage. Left Ear: External ear normal. No drainage. Nose: Nose normal.   Eyes: Lids are normal. Right eye exhibits no discharge. Left eye exhibits no discharge. Right conjunctiva has no hemorrhage. Left conjunctiva has no hemorrhage. Neck: Neck supple. No tracheal deviation present. No thyroid mass present. Pulmonary/Chest: Effort normal. No respiratory distress. Neurological: She is alert. No cranial nerve deficit. Skin: Skin is intact. No rash noted. Psychiatric: Her speech is normal. Her affect is not angry. She does not express inappropriate judgment. Nursing note and vitals reviewed. ASSESSMENT and PLAN  Encounter Diagnoses   Name Primary?  Chronic midline low back pain with right-sided sciatica Yes    Postlaminectomy syndrome, lumbar region     Degeneration of lumbar intervertebral disc     Foraminal stenosis of lumbar region     Lumbar facet arthropathy     Lumbar post-laminectomy syndrome    --Urine test or oral swab today. Also, the prescription monitoring program was reviewed today. The majority of today's visit was spent counseling and coordinating care. Total visit time-40 minutes. -Dragon medical dictation software was used for portions of this report. Unintended errors may occur. No indicators for recent medication misuse.  reviewed. Pain Meds and Quality Of Life have been reviewed. Nonpharmacologic therapy and non-opioid pharmacologic therapy were considered. If opioid therapy is prescribed, this is only if the expected benefits are anticipated to outweigh risks. Possible changes to treatment plan considered. Support/education given as needed. Today-medications are as listed. No significant changes to medications. Follow up -- 3 months. The patient's care with this clinic has been under the direction of Dr. Desirae Maldonado.   I have reviewed medical information today including progress notes. Review of past treatments, past medications, current medications. Review of diagnoses. I have obtained history from the patient today as well. There have been changes concerning pain management across our country and in the state of [place]. Also, changes within our own clinic. Nursing discussed these changes with the patient. I also spoke with the patient on these matters today. The patient's questions were answered. The patient is aware that Dr. Brenna Russell is no longer with this clinic. The patient is also aware that they are free to choose a different pain clinic if they wish and our clinic will help transition them if necessary. Chronic pain and opioids. Also, when appropriate, the patient will receive a copy of a research study, titled Benzodiazepines and risk of all cause mortality in adults. I explained to the patient and the  today, moving forward I will no longer be with this clinic. The patient understands she may continue her care with this clinic and I did schedule follow-up with this clinic. The patient was also given a list of other pain clinics/physicians in the area. In addition, the patient has benefited from aqua physical therapy in the past.  The  explained that she has had many, many sessions. I have encouraged the patient to join a fitness center with a pool and she should perform gentle exercise/water aerobics in the pool on a regular basis. I believe she would benefit from this. Additional time was spent talking to the  today as well, Jessica Mock.

## 2017-12-06 NOTE — PROGRESS NOTES
Nursing Notes    Patient presents to the office today in follow-up. Patient rates her pain at 5/10 on the numerical pain scale. Reviewed medications with counts as follows:    Rx Date filled Qty Dispensed Pill Count Last Dose Short   Fentanyl 37.5 mcg/hr  11/21/17 15 7+1 on  Current patch on right arm no   Tramadol 50 mg- 1 refill on bottle 10/27/17 180 103 today no                          POC UDS was performed in office today. Any new labs or imaging since last appointment? YES. Pt states that she had routine labs done     Have you been to an emergency room (ER) or urgent care clinic since your last visit? YES. Pt went to an urgent care for URI            Have you been hospitalized since your last visit? NO     If yes, where, when, and reason for visit? Have you seen or consulted any other health care providers outside of the [address] [place] [address]  since your last visit? YES     If yes, where, when, and reason for visit? pcp    HM deferred to pcp.

## 2018-01-03 ENCOUNTER — OFFICE VISIT (OUTPATIENT)
Dept: FAMILY MEDICINE CLINIC | Age: 63
End: 2018-01-03

## 2018-01-03 VITALS
TEMPERATURE: 97.7 F | RESPIRATION RATE: 18 BRPM | WEIGHT: 160 LBS | DIASTOLIC BLOOD PRESSURE: 86 MMHG | OXYGEN SATURATION: 96 % | BODY MASS INDEX: 29.44 KG/M2 | HEART RATE: 74 BPM | HEIGHT: 62 IN | SYSTOLIC BLOOD PRESSURE: 148 MMHG

## 2018-01-03 DIAGNOSIS — J32.9 RECURRENT SINUSITIS: Primary | ICD-10-CM

## 2018-01-03 DIAGNOSIS — H10.13 ALLERGIC CONJUNCTIVITIS OF BOTH EYES: ICD-10-CM

## 2018-01-03 PROBLEM — F33.9 RECURRENT DEPRESSION (HCC): Status: ACTIVE | Noted: 2018-01-03

## 2018-01-03 RX ORDER — OLOPATADINE HYDROCHLORIDE 1 MG/ML
2 SOLUTION/ DROPS OPHTHALMIC 2 TIMES DAILY
Qty: 5 ML | Refills: 1 | Status: SHIPPED | OUTPATIENT
Start: 2018-01-03 | End: 2018-06-05 | Stop reason: ALTCHOICE

## 2018-01-03 RX ORDER — CIPROFLOXACIN 500 MG/1
500 TABLET ORAL 2 TIMES DAILY
Qty: 20 TAB | Refills: 0 | Status: SHIPPED | OUTPATIENT
Start: 2018-01-03 | End: 2018-01-11 | Stop reason: ALTCHOICE

## 2018-01-03 RX ORDER — METHYLPREDNISOLONE 4 MG/1
TABLET ORAL
Qty: 1 DOSE PACK | Refills: 0 | Status: SHIPPED | OUTPATIENT
Start: 2018-01-03 | End: 2018-01-23 | Stop reason: ALTCHOICE

## 2018-01-03 NOTE — PROGRESS NOTES
HISTORY OF PRESENT ILLNESS  José Miguel Jones is a 63 y.o. female. HPI  Recurrent eye infections  A/w sinus congestion and pressure  A/w systemic symptoms, myalgias, sweats  Review of Systems   HENT: Positive for congestion. Eyes: Positive for discharge and redness. Musculoskeletal: Positive for myalgias. Neurological: Positive for headaches. All other systems reviewed and are negative. Past Medical History:   Diagnosis Date    Anemia     Asthma     on allergy shots, no inhalaers    Back injury     Back pain     Breast cancer ([facility] [address] 12/6/2011    Dr. Travis Michele; Dr. Lizett bolton     Carpal tunnel syndrome, right     EMG done only in left however    Chronic pain     pelvic pain    Constipation     G6PD (glucose 6 phosphatase deficiency)     Gastroparesis     GERD (gastroesophageal reflux disease)     acid reflux    H/O colonoscopy 3/15/2013    Dr. Ky Davey Hypertension     no mediacations, doctor is monitoring    Hyperthyroidism     Grave's- radiation    IBS (irritable bowel syndrome)     Lymphedema 2012    in her right underarm    Numbness     legs    Osteopenia 3/19/2013    Other and unspecified hyperlipidemia     PUD (peptic ulcer disease)     pyloric ulcer    Unspecified hypothyroidism      Current Outpatient Prescriptions on File Prior to Visit   Medication Sig Dispense Refill    [START ON 2/4/2018] fentaNYL 37.5 mcg/hour pt72 1 Patch by TransDERmal route every fourty-eight (48) hours. Max Daily Amount: 1 Patch. 15 Patch 0    [START ON 1/5/2018] fentaNYL 37.5 mcg/hour pt72 1 Patch by TransDERmal route every fourty-eight (48) hours. Max Daily Amount: 1 Patch. for chronic, severe, refractory pain. Mylan, Sandoz, or Mallinckrodt brands preferred 15 Patch 0    methocarbamol (ROBAXIN) 750 mg tablet TAKE 1 TAB BY MOUTH THREE (3) TIMES DAILY.  AS NEEDED FOR MUSCLE SPASMS 90 Tab 2    traMADol (ULTRAM) 50 mg tablet Take 2 Tabs by mouth three (3) times daily as needed for Pain. Max Daily Amount: 300 mg. 180 Tab 2    diclofenac (SOLARAZE) 3 % topical gel Apply 2-4 g to affected area two (2) times a day. As needed for joint pain. Apply to painful areas 100 g 5    fentaNYL 37.5 mcg/hour pt72 1 Patch by TransDERmal route every fourty-eight (48) hours. Max Daily Amount: 1 Patch. 15 Patch 0    ondansetron (ZOFRAN ODT) 8 mg disintegrating tablet Take 1 Tab by mouth every eight (8) hours as needed for Nausea. 30 Tab 2    doxycycline (VIBRAMYCIN) 100 mg capsule Take 1 capsule twice daily with food for 10 days, remain upright for 45 minutes after each dose. 20 Cap 0    traZODone (DESYREL) 50 mg tablet TAKE 2 TABS BY MOUTH NIGHTLY. AS NEEDED FOR INSOMNIA 60 Tab 3    levothyroxine (SYNTHROID) 100 mcg tablet TAKE 1 TABLET BY MOUTH DAILY (BEFORE BREAKFAST) 90 Tab 2    lidocaine (XYLOCAINE) 5 % ointment Apply 1-2 g to affected area as needed for Pain. To painful areas 120 g 5    acetaminophen (TYLENOL) 500 mg tablet Take  by mouth every six (6) hours as needed for Pain.  mometasone (NASONEX) 50 mcg/actuation nasal spray 2 Sprays daily as needed.  ranitidine (ZANTAC) 150 mg tablet Take 150 mg by mouth daily as needed.  loratadine (CLARITIN) 10 mg tablet Take 10 mg by mouth daily as needed. No current facility-administered medications on file prior to visit. Visit Vitals    /86 (BP 1 Location: Right arm, BP Patient Position: Sitting)    Pulse 74    Temp 97.7 °F (36.5 °C) (Oral)    Resp 18    Ht 5' 2\" (1.575 m)    Wt 160 lb (72.6 kg)    LMP 08/28/2002    SpO2 96%    BMI 29.26 kg/m2   VA 20:25      Physical Exam   Constitutional: She appears well-developed and well-nourished. No distress. HENT:   Head: Normocephalic and atraumatic. Mouth/Throat: Oropharyngeal exudate present. Tm dull x 2   Eyes: Pupils are equal, round, and reactive to light. Right eye exhibits no discharge. Left eye exhibits no discharge. No scleral icterus.    + swollen lids x 2  + conjunctival erytmea   Skin: She is not diaphoretic. Vitals reviewed.       ASSESSMENT and PLAN  recurrent sinusitis   Chronic allergic conjunctivitis  Plan  Medrol dospak  Cefdinir  patanol

## 2018-01-03 NOTE — MR AVS SNAPSHOT
Visit Information Date & Time Provider Department Dept. Phone Encounter #  
 1/3/2018  3:15 PM [facility] [phone] [id number] Follow-up Instructions Return for with Dr. Kassandra Barton for routine follow-up. Follow-up and Disposition History Your Appointments 3/5/2018  1:20 PM  
Follow Up with CORY Paris [facility] for Pain Management (WOLF SCHEDULING) Appt Note: 3 mon f/u per gs. ..to  
 30 [facility] [id number] [phone] [address] [address]  
  
    
 3/5/2018  1:40 PM  
Follow Up with CORY Paris [facility] Pain Management (WOLF SCHEDULING) Appt Note: w/c  appt 30 [facility] [address]  
[phone] Upcoming Health Maintenance Date Due Pneumococcal 19-64 Highest Risk (1 of 3 - PCV13) 12/23/1976 BREAST CANCER SCRN MAMMOGRAM 10/29/2015 Influenza Age 5 to Adult 8/1/2017 ZOSTER VACCINE AGE 60> 10/23/2017 PAP AKA CERVICAL CYTOLOGY 12/13/2019 COLONOSCOPY 3/15/2023 DTaP/Tdap/Td series (2 - Td) 4/1/2025 Allergies as of 1/3/2018  Review Complete On: 1/3/2018 By: Sally Mojica MD  
  
 Severity Noted Reaction Type Reactions Adhesive    Rash Aspirin  06/20/2011    Other (comments), Nausea and Vomiting G6PD 
GI BLEED AND ULCER Contrast Agent [Iodine]    Rash Allergic to CT Dye  
 Dilantin [Phenytoin Sodium Extended]    Other (comments) Difficulty waking Iodinated Contrast- Oral And Iv Dye  05/03/2016    Rash \"bumps on face\" Lipitor [Atorvastatin]  12/13/2016    Other (comments) PKU Pcn [Penicillins]    Rash, Hives \"sores all over the body\" Phenytoin  05/03/2016    Unknown (comments) \"Trouble waking up\" Sulfa (Sulfonamide Antibiotics)    Rash, Nausea and Vomiting  G6PD 
G6PD  
 Tegretol [Carbamazepine]    Other (comments), Vertigo \"Trouble waking up\" Difficulty waking up Current Immunizations  Reviewed on 11/25/2015 Name Date Influenza Vaccine 11/25/2015  7:48 AM  
 Tdap 4/1/2015 Not reviewed this visit You Were Diagnosed With   
  
 Codes Comments Recurrent sinusitis    -  Primary ICD-10-CM: J32.9 ICD-9-CM: 473.9 Allergic conjunctivitis of both eyes     ICD-10-CM: H10.13 ICD-9-CM: 372.14 Vitals BP Pulse Temp Resp Height(growth percentile) Weight(growth percentile) 148/86 (BP 1 Location: Right arm, BP Patient Position: Sitting) 74 97.7 °F (36.5 °C) (Oral) 18 5' 2\" (1.575 m) 160 lb (72.6 kg) LMP SpO2 BMI OB Status Smoking Status 08/28/2002 96% 29.26 kg/m2 Postmenopausal Never Smoker Vitals History BMI and BSA Data Body Mass Index Body Surface Area  
 29.26 kg/m 2 1.78 m 2 [facility] [phone] Your Updated Medication List  
  
   
This list is accurate as of: 1/3/18  4:06 PM.  Always use your most recent med list.  
  
  
  
  
 acetaminophen 500 mg tablet Commonly known as:  TYLENOL Take  by mouth every six (6) hours as needed for Pain. ciprofloxacin HCl 500 mg tablet Commonly known as:  CIPRO Take 1 Tab by mouth two (2) times a day for 10 days. CLARITIN 10 mg tablet Generic drug:  loratadine Take 10 mg by mouth daily as needed. diclofenac 3 % topical gel Commonly known as:  [address] Apply 2-4 g to affected area two (2) times a day. As needed for joint pain. Apply to painful areas  
  
 doxycycline 100 mg capsule Commonly known as:  VIBRAMYCIN Take 1 capsule twice daily with food for 10 days, remain upright for 45 minutes after each dose. * fentaNYL 37.5 mcg/hour Pt72  
1 Patch by TransDERmal route every fourty-eight (48) hours.  Max Daily Amount: 1 Patch. * fentaNYL 37.5 mcg/hour Pt72  
1 Patch by TransDERmal route every fourty-eight (48) hours. Max Daily Amount: 1 Patch. for chronic, severe, refractory pain. Mylan, Sandoz, or Mallinckrodt brands preferred Start taking on:  1/5/2018 * fentaNYL 37.5 mcg/hour Pt72  
1 Patch by TransDERmal route every fourty-eight (48) hours. Max Daily Amount: 1 Patch. Start taking on:  2/4/2018  
  
 levothyroxine 100 mcg tablet Commonly known as:  SYNTHROID  
TAKE 1 TABLET BY MOUTH DAILY (BEFORE BREAKFAST) lidocaine 5 % ointment Commonly known as:  XYLOCAINE Apply 1-2 g to affected area as needed for Pain. To painful areas  
  
 methocarbamol 750 mg tablet Commonly known as:  ROBAXIN  
TAKE 1 TAB BY MOUTH THREE (3) TIMES DAILY. AS NEEDED FOR MUSCLE SPASMS  
  
 methylPREDNISolone 4 mg tablet Commonly known as:  Nazanin Mola Take as directed NASONEX 50 mcg/actuation nasal spray Generic drug:  mometasone 2 Sprays daily as needed. olopatadine 0.1 % ophthalmic solution Commonly known as:  PATANOL Administer 2 Drops to both eyes two (2) times a day. ondansetron 8 mg disintegrating tablet Commonly known as:  ZOFRAN ODT Take 1 Tab by mouth every eight (8) hours as needed for Nausea. traMADol 50 mg tablet Commonly known as:  ULTRAM  
Take 2 Tabs by mouth three (3) times daily as needed for Pain. Max Daily Amount: 300 mg.  
  
 traZODone 50 mg tablet Commonly known as:  DESYREL  
TAKE 2 TABS BY MOUTH NIGHTLY. AS NEEDED FOR INSOMNIA  
  
 ZANTAC 150 mg tablet Generic drug:  raNITIdine Take 150 mg by mouth daily as needed. * Notice: This list has 3 medication(s) that are the same as other medications prescribed for you. Read the directions carefully, and ask your doctor or other care provider to review them with you. Prescriptions Sent to Pharmacy  Refills  
 ciprofloxacin HCl (CIPRO) 500 mg tablet 0  
 Sig: Take 1 Tab by mouth two (2) times a day for 10 days. Class: Normal  
 Pharmacy: [address] [place] [address], [address] [facility] Ph #: [phone] Route: Oral  
 methylPREDNISolone (MEDROL DOSEPACK) 4 mg tablet 0 Sig: Take as directed Class: Normal  
 Pharmacy: [address] [place] [address], [address] Ph #: [phone]  
 olopatadine (PATANOL) 0.1 % ophthalmic solution 1 Sig: Administer 2 Drops to both eyes two (2) times a day. Class: Normal  
 Pharmacy: [address] [place] [address], [address] [facility] Ph #: [phone] Route: Both Eyes Follow-up Instructions Return for with Dr. Gunner Redman for routine follow-up. Introducing [facility] & HE[facility]! [facility] introduces [employer] patient portal. Now you can access parts of your medical record, email your doctor's office, and request medication refills online. 1. In your internet browser, go to [email or web address] 2. Click on the First Time User? Click Here link in the Sign In box. You will see the New Member Sign Up page. 3. Enter your [employer] Access Code exactly as it appears below. You will not need to use this code after youve completed the sign-up process. If you do not sign up before the expiration date, you must request a new code. · [employer] Access Code: [email or web address] Expires: 4/3/2018  4:06 PM 
 
4. Enter the last four digits of your Social Security Number (xxxx) and Date of Birth (mm/dd/yyyy) as indicated and click Submit. You will be taken to the next sign-up page. 5. Create a [employer] ID. This will be your [employer] login ID and cannot be changed, so think of one that is secure and easy to remember. 6. Create a [employer] password. You can change your password at any time. 7. Enter your Password Reset Question and Answer. This can be used at a later time if you forget your password. 8. Enter your e-mail address. You will receive e-mail notification when new information is available in [address] 9. Click Sign Up. You can now view and download portions of your medical record. 10. Click the Download Summary menu link to download a portable copy of your medical information. If you have questions, please visit the Frequently Asked Questions section of the [employer] website. Remember, [employer] is NOT to be used for urgent needs. For medical emergencies, dial 911. Now available from your iPhone and Android! Please provide this summary of care documentation to your next provider. Your primary care clinician is listed as [address]. If you have any questions after today's visit, please call [phone].

## 2018-01-03 NOTE — PROGRESS NOTES
José Miguel Jones is a 63 y.o. female (: 1955) presenting to address:    Chief Complaint   Patient presents with   [address] Drainage    Eye Pain     pain scale 8/10    Generalized Body Aches       Vitals:    18 1521   BP: 148/86   Pulse: 74   Resp: 18   Temp: 97.7 °F (36.5 °C)   TempSrc: Oral   SpO2: 96%   Weight: 160 lb (72.6 kg)   Height: 5' 2\" (1.575 m)   PainSc:   8   PainLoc: Eye   LMP: 2002       Hearing/Vision:   No exam data present    Learning Assessment:     Learning Assessment 10/9/2017   PRIMARY LEARNER Patient   HIGHEST LEVEL OF EDUCATION - PRIMARY LEARNER  -   BARRIERS PRIMARY LEARNER -   CO-LEARNER CAREGIVER -   PRIMARY LANGUAGE ENGLISH   LEARNER PREFERENCE PRIMARY READING     LISTENING     PICTURES     VIDEOS     DEMONSTRATION   ANSWERED BY self   RELATIONSHIP SELF     Depression Screening:     PHQ over the last two weeks 3/27/2017   Little interest or pleasure in doing things Nearly every day   Feeling down, depressed or hopeless More than half the days   Total Score PHQ 2 5     Fall Risk Assessment:     Fall Risk Assessment, last 12 mths 2017   Able to walk? Yes   Fall in past 12 months? No     Abuse Screening:     Abuse Screening Questionnaire 10/9/2017   Do you ever feel afraid of your partner? N   Are you in a relationship with someone who physically or mentally threatens you? N   Is it safe for you to go home? Y     Coordination of Care Questionaire:   1. Have you been to the ER, urgent care clinic since your last visit? Hospitalized since your last visit? NO    2. Have you seen or consulted any other health care providers outside of the [address] [place] [address] since your last visit? Include any pap smears or colon screening. NO    Advanced Directive:   1. Do you have an Advanced Directive? NO    2. Would you like information on Advanced Directives?  NO

## 2018-01-11 ENCOUNTER — TELEPHONE (OUTPATIENT)
Dept: FAMILY MEDICINE CLINIC | Age: 63
End: 2018-01-11

## 2018-01-11 RX ORDER — AZITHROMYCIN 500 MG/1
500 TABLET, FILM COATED ORAL DAILY
Qty: 7 TAB | Refills: 0 | Status: SHIPPED | OUTPATIENT
Start: 2018-01-11 | End: 2018-01-18

## 2018-01-11 NOTE — TELEPHONE ENCOUNTER
I will send in a Rx for zithromax x 7 days (a longer course than usual). She will need to stop the Cipro. Please clarify where drainage is from, sinus or eyes?

## 2018-01-11 NOTE — TELEPHONE ENCOUNTER
Patient called she was seen on Dr Tennille Hanks on 1/2/2018 for sinus and eye infection and has 3 days left of medication and still has drainage and still feels real sick she is asking if she can get another medication or does she have to be seen again

## 2018-01-12 NOTE — TELEPHONE ENCOUNTER
Spoke with patient  Notified her of dr Shante Dawson message drainage is in left eye and is the worse  Also has sinus drainage  using sudafed to try and  dry it up.

## 2018-01-23 ENCOUNTER — OFFICE VISIT (OUTPATIENT)
Dept: FAMILY MEDICINE CLINIC | Age: 63
End: 2018-01-23

## 2018-01-23 VITALS
RESPIRATION RATE: 20 BRPM | BODY MASS INDEX: 29.81 KG/M2 | TEMPERATURE: 98.1 F | SYSTOLIC BLOOD PRESSURE: 144 MMHG | DIASTOLIC BLOOD PRESSURE: 86 MMHG | HEIGHT: 62 IN | HEART RATE: 84 BPM | OXYGEN SATURATION: 95 % | WEIGHT: 162 LBS

## 2018-01-23 DIAGNOSIS — J01.01 ACUTE RECURRENT MAXILLARY SINUSITIS: Primary | ICD-10-CM

## 2018-01-23 DIAGNOSIS — C50.911 MALIGNANT NEOPLASM OF RIGHT FEMALE BREAST, UNSPECIFIED ESTROGEN RECEPTOR STATUS, UNSPECIFIED SITE OF BREAST (HCC): ICD-10-CM

## 2018-01-23 DIAGNOSIS — Z12.39 BREAST CANCER SCREENING: ICD-10-CM

## 2018-01-23 RX ORDER — FLUTICASONE PROPIONATE 50 MCG
2 SPRAY, SUSPENSION (ML) NASAL DAILY
Qty: 1 BOTTLE | Refills: 0 | Status: SHIPPED | OUTPATIENT
Start: 2018-01-23 | End: 2018-06-06 | Stop reason: SDUPTHER

## 2018-01-23 RX ORDER — LEVOFLOXACIN 500 MG/1
500 TABLET, FILM COATED ORAL DAILY
Qty: 10 TAB | Refills: 0 | Status: SHIPPED | OUTPATIENT
Start: 2018-01-23 | End: 2018-02-02

## 2018-01-23 NOTE — MR AVS SNAPSHOT
[address] [facility] [address] [phone] Patient: Sha Mora MRN: [id number] :1955 Visit Information Date & Time Provider Department Dept. Phone Encounter #  
 2018  3:00 PM Coreen Leon [facility] [phone] [id number] Your Appointments 3/5/2018  1:20 PM  
Follow Up with CORY Ashton [address] [place] [address] for Pain Management (WOLF SCHEDULING) Appt Note: 3 mon f/u per gs. ..to  
 30 [facility] [address]  
[phone] [address] [address]  
  
    
 3/5/2018  1:40 PM  
[facility] [address] [facility] (W[facility]F [facility]EDULING) Appt Note: w/c  appt 30 [facility] [address]  
[phone] Upcoming Health Maintenance Date Due Pneumococcal 19-64 Highest Risk (1 of 3 - PCV13) 1976 BREAST CANCER SCRN MAMMOGRAM 10/29/2015 Influenza Age 5 to Adult 2017 ZOSTER VACCINE AGE 60> 10/23/2017 PAP AKA CERVICAL CYTOLOGY 2019 COLONOSCOPY 3/15/2023 DTaP/Tdap/Td series (2 - Td) 2025 Allergies as of 2018  Review Complete On: 2018 By: Benjamin Gusman LPN Severity Noted Reaction Type Reactions Adhesive    Rash Aspirin  2011    Other (comments), Nausea and Vomiting G6PD 
GI BLEED AND ULCER Contrast Agent [Iodine]    Rash Allergic to CT Dye  
 Dilantin [Phenytoin Sodium Extended]    Other (comments) Difficulty waking Iodinated Contrast- Oral And Iv Dye  2016    Rash \"bumps on face\" Lipitor [Atorvastatin]  2016    Other (comments) PKU Pcn [Penicillins]    Rash, Hives \"sores all over the body\" Phenytoin  2016    Unknown (comments) \"Trouble waking up\" Sulfa (Sulfonamide Antibiotics)    Rash, Nausea and Vomiting G6PD 
G6PD Tegretol [Carbamazepine]    Other (comments), Vertigo \"Trouble waking up\" Difficulty waking up Current Immunizations  Reviewed on 11/25/2015 Name Date Influenza Vaccine 11/25/2015  7:48 AM  
 Tdap 4/1/2015 Not reviewed this visit You Were Diagnosed With   
  
 Codes Comments Acute recurrent maxillary sinusitis    -  Primary ICD-10-CM: J01.01 
ICD-9-CM: 461.0 Breast cancer screening     ICD-10-CM: Z12.31 
ICD-9-CM: V76.10 Malignant neoplasm of right female breast, unspecified estrogen receptor status, unspecified site of breast ([facility]     ICD-10-CM: C50.911 ICD-9-CM: 174.9 Vitals BP Pulse Temp Resp Height(growth percentile) Weight(growth percentile) 144/86 (BP 1 Location: Left arm, BP Patient Position: Sitting) 84 98.1 °F (36.7 °C) (Oral) 20 5' 2\" (1.575 m) 162 lb (73.5 kg) LMP SpO2 BMI OB Status Smoking Status 08/28/2002 95% 29.63 kg/m2 Postmenopausal Never Smoker BMI and BSA Data Body Mass Index Body Surface Area  
 29.63 kg/m 2 1.79 m 2 [facility] [phone] Your Updated Medication List  
  
   
This list is accurate as of: 1/23/18  4:00 PM.  Always use your most recent med list.  
  
  
  
  
 acetaminophen 500 mg tablet Commonly known as:  TYLENOL Take  by mouth every six (6) hours as needed for Pain. CLARITIN 10 mg tablet Generic drug:  loratadine Take 10 mg by mouth daily as needed. diclofenac 3 % topical gel Commonly known as:  [place] Apply 2-4 g to affected area two (2) times a day. As needed for joint pain. Apply to painful areas * fentaNYL 37.5 mcg/hour Pt72  
1 Patch by TransDERmal route every fourty-eight (48) hours. Max Daily Amount: 1 Patch. * fentaNYL 37.5 mcg/hour Pt72 1 Patch by TransDERmal route every fourty-eight (48) hours. Max Daily Amount: 1 Patch. for chronic, severe, refractory pain. Mylan, Sandoz, or Mallinckrodt brands preferred * fentaNYL 37.5 mcg/hour Pt72  
1 Patch by TransDERmal route every fourty-eight (48) hours. Max Daily Amount: 1 Patch. Start taking on:  2/4/2018  
  
 fluticasone 50 mcg/actuation nasal spray Commonly known as:  Anton Terri 2 Sprays by Both Nostrils route daily. levoFLOXacin 500 mg tablet Commonly known as:  Marrion Silverio Take 1 Tab by mouth daily for 10 days. levothyroxine 100 mcg tablet Commonly known as:  SYNTHROID  
TAKE 1 TABLET BY MOUTH DAILY (BEFORE BREAKFAST) lidocaine 5 % ointment Commonly known as:  XYLOCAINE Apply 1-2 g to affected area as needed for Pain. To painful areas  
  
 methocarbamol 750 mg tablet Commonly known as:  ROBAXIN  
TAKE 1 TAB BY MOUTH THREE (3) TIMES DAILY. AS NEEDED FOR MUSCLE SPASMS MOVANTIK 12.5 mg Tab tablet Generic drug:  naloxegol Take 12.5 mg by mouth Daily (before breakfast). NASONEX 50 mcg/actuation nasal spray Generic drug:  mometasone 2 Sprays daily as needed. olopatadine 0.1 % ophthalmic solution Commonly known as:  PATANOL Administer 2 Drops to both eyes two (2) times a day. ondansetron 8 mg disintegrating tablet Commonly known as:  ZOFRAN ODT Take 1 Tab by mouth every eight (8) hours as needed for Nausea. traMADol 50 mg tablet Commonly known as:  ULTRAM  
Take 2 Tabs by mouth three (3) times daily as needed for Pain. Max Daily Amount: 300 mg.  
  
 traZODone 50 mg tablet Commonly known as:  DESYREL  
TAKE 2 TABS BY MOUTH NIGHTLY. AS NEEDED FOR INSOMNIA  
  
 XIIDRA 5 % Dpet Generic drug:  lifitegrast  
Apply  to eye. ZANTAC 150 mg tablet Generic drug:  raNITIdine Take 150 mg by mouth daily as needed. * Notice:   This list has 3 medication(s) that are the same as other medications prescribed for you. Read the directions carefully, and ask your doctor or other care provider to review them with you. Prescriptions Sent to Pharmacy Refills  
 levoFLOXacin (LEVAQUIN) 500 mg tablet 0 Sig: Take 1 Tab by mouth daily for 10 days. Class: Normal  
 Pharmacy: [address] [place] [address],  [facility] Ph #: [phone] Route: Oral  
 fluticasone (FLONASE) 50 mcg/actuation nasal spray 0 Si Sprays by Both Nostrils route daily. Class: Normal  
 Pharmacy: [address] [place] [address],  [facility] Ph #: [phone] Route: Both Nostrils To-Do List   
 2018 Imaging:  TAISHA MAMMO LT SCREENING INCL CAD   
  
 2018 Imaging:  US BREAST RT LIMITED=<3 QUAD Introducing [facility] & HEALTH SERVICES! [employer] introduces [employer] patient portal. Now you can access parts of your medical record, email your doctor's office, and request medication refills online. 1. In your internet browser, go to [email or web address] 2. Click on the First Time User? Click Here link in the Sign In box. You will see the New Member Sign Up page. 3. Enter your [employer] Access Code exactly as it appears below. You will not need to use this code after youve completed the sign-up process. If you do not sign up before the expiration date, you must request a new code. · [employer] Access Code: [email or web address] Expires: 4/3/2018  4:06 PM 
 
4. Enter the last four digits of your Social Security Number (xxxx) and Date of Birth (mm/dd/yyyy) as indicated and click Submit. You will be taken to the next sign-up page. 5. Create a [employer] ID. This will be your [employer] login ID and cannot be changed, so think of one that is secure and easy to remember. 6. Create a [employer] password. You can change your password at any time. 7. Enter your Password Reset Question and Answer. This can be used at a later time if you forget your password. 8. Enter your e-mail address. You will receive e-mail notification when new information is available in [address] 9. Click Sign Up. You can now view and download portions of your medical record. 10. Click the Download Summary menu link to download a portable copy of your medical information. If you have questions, please visit the Frequently Asked Questions section of the [employer] website. Remember, [employer] is NOT to be used for urgent needs. For medical emergencies, dial 911. Now available from your iPhone and Android! Please provide this summary of care documentation to your next provider. Your primary care clinician is listed as Mahesh Mosquera. If you have any questions after today's visit, please call [phone].

## 2018-01-23 NOTE — PROGRESS NOTES
Israel Melendez is a 63 y.o. female is here for sinus problems     1. Have you been to the ER, urgent care clinic since your last visit? Hospitalized since your last visit? No    2. Have you seen or consulted any other health care providers outside of the [address] [place] [address] since your last visit? Include any pap smears or colon screening. eye doctor      Health Maintenance Due   Topic Date Due    Pneumococcal 19-64 Highest Risk (1 of 3 - PCV13) 12/23/1976    BREAST CANCER SCRN MAMMOGRAM  10/29/2015    Influenza Age 9 to Adult  08/01/2017    ZOSTER VACCINE AGE 60>  10/23/2017     Doesn't want a flu shot.

## 2018-01-23 NOTE — PROGRESS NOTES
Assessment/Plan:    *Diagnoses and all orders for this visit:    1. Acute recurrent maxillary sinusitis  -     levoFLOXacin (LEVAQUIN) 500 mg tablet; Take 1 Tab by mouth daily for 10 days. -     fluticasone (FLONASE) 50 mcg/actuation nasal spray; 2 Sprays by Both Nostrils route daily. 2. Breast cancer screening  -      BREAST RT LIMITED=<3 QUAD; Future  -     [facility] MAMMO LT SCREENING INCL CAD; Future    3. Malignant neoplasm of right female breast, unspecified estrogen receptor status, unspecified site of breast ([facility] [address]      Will await results of above. If pt's sinusitis does not respond to Tx, will refer her to ENT. The plan was discussed with the patient. The patient verbalized understanding and is in agreement with the plan. All medication potential side effects were discussed with the patient.    -------------------------------------------------------------------------------------------------------------------        Monica Sunny is a 63 y.o. female and presents with Sinus Pain         Subjective:  Pt started feeling unwell in Nov 2017. Was seen here by a colleague on 11/28. Was placed on Doxy, completed this but was not fully better. Was seen again and placed on Cipro but she could not tolerate this well, had to stop it after 8 days. Was also placed on Tobrex for conjunctivitis. She was later tried on allergy eye drops but this did not help. At this point, her sinuses did feel better and she was just left with the eye irritation. She went to see an ophthalmologist who said she had dry eyes and treated her accordingly. This has improved. Now, in the last few days, she has been getting sick again, her  has been dealing with a URI. She feels she has gotten sick again from him. Has sinus pressure, burning along the frontal and maxillary regions. She also has noted a new pain in the RT breast intermittently. Has hx of RT breast CA.   Is due for her annual.    ROS:  Constitutional: No recent weight change. No weakness/fatigue. No f/c. Skin: No rashes, change in nails/hair, itching   HENT: No HA, dizziness. No hearing loss/tinnitus. No nasal congestion/discharge. Eyes: No change in vision, double/blurred vision or eye pain/redness. Cardiovascular: No CP/palpitations. No SIMON/orthopnea/PND. Respiratory: No cough/sputum, dyspnea, wheezing. Gastointestinal: No dysphagia, reflux. No n/v. No constipation/diarrhea. No melena/rectal bleeding. Genitourinary: No dysuria, urinary hesitancy, nocturia, hematuria. No incontinence. Musculoskeletal: No joint pain/stiffness. No muscle pain/tenderness. Endo: No heat/cold intolerance, no polyuria/polydypsia. Heme: No h/o anemia. No easy bleeding/bruising. Allergy/Immunology: No seasonal rhinitis. Denies frequent colds, sinus/ear infections. Neurological: No seizures/numbness/weakness. No paresthesias. Psychiatric:  No depression, anxiety. The problem list was updated as a part of today's visit.   Patient Active Problem List   Diagnosis Code    Chronic low back pain M54.5, G89.29    Postlaminectomy syndrome, lumbar region M96.1    Degeneration of lumbar intervertebral disc M51.37    Pain in joint, multiple sites M25.50    Pain in joint, shoulder region M25.519    Generalized osteoarthritis M15.9    History of breast cancer C50.919    Unspecified hypothyroidism E03.9    HLD (hyperlipidemia) E78.5    G6PD (glucose 6 phosphatase deficiency)     Shoulder pain, left M25.512    Recurrent UTI N39.0    Chemotherapy-induced neuropathy (HCC) G62.0, T45.1X5A    Chest wall pain following surgery R07.89, G89.18    Renal insufficiency N28.9    Proteinuria R80.9    Chronic insomnia F51.04    Paroxysmal sleep G47.419    Encounter for long-term (current) use of high-risk medication Z79.899    Foraminal stenosis of lumbar region M99.83    Lumbar facet arthropathy M12.88    Lumbar post-laminectomy syndrome M96.1    Lumbar neuritis M54.16    Spasm of back muscles M62.830    Drug-induced constipation K59.03    Bilateral sacroiliitis (HCC) M46.1    Drug-induced nausea and vomiting R11.2, T50.905A    Irritable bowel syndrome with both constipation and diarrhea K58.2    Fibromyalgia M79.7    Anxiety F41.9    Depression F32.9    Hypersomnolence G47.10    Chronic pain syndrome G89.4    Recurrent depression (HCC) F33.9       The PSH, FH were reviewed. SH:  Social History   Substance Use Topics    Smoking status: Never Smoker    Smokeless tobacco: Never Used    Alcohol use No       Medications/Allergies:  Current Outpatient Prescriptions on File Prior to Visit   Medication Sig Dispense Refill    [START ON 2/4/2018] fentaNYL 37.5 mcg/hour pt72 1 Patch by TransDERmal route every fourty-eight (48) hours. Max Daily Amount: 1 Patch. 15 Patch 0    methocarbamol (ROBAXIN) 750 mg tablet TAKE 1 TAB BY MOUTH THREE (3) TIMES DAILY. AS NEEDED FOR MUSCLE SPASMS 90 Tab 2    traMADol (ULTRAM) 50 mg tablet Take 2 Tabs by mouth three (3) times daily as needed for Pain. Max Daily Amount: 300 mg. 180 Tab 2    diclofenac (SOLARAZE) 3 % topical gel Apply 2-4 g to affected area two (2) times a day. As needed for joint pain. Apply to painful areas 100 g 5    ondansetron (ZOFRAN ODT) 8 mg disintegrating tablet Take 1 Tab by mouth every eight (8) hours as needed for Nausea. 30 Tab 2    levothyroxine (SYNTHROID) 100 mcg tablet TAKE 1 TABLET BY MOUTH DAILY (BEFORE BREAKFAST) 90 Tab 2    lidocaine (XYLOCAINE) 5 % ointment Apply 1-2 g to affected area as needed for Pain. To painful areas 120 g 5    acetaminophen (TYLENOL) 500 mg tablet Take  by mouth every six (6) hours as needed for Pain.  mometasone (NASONEX) 50 mcg/actuation nasal spray 2 Sprays daily as needed.  ranitidine (ZANTAC) 150 mg tablet Take 150 mg by mouth daily as needed.  loratadine (CLARITIN) 10 mg tablet Take 10 mg by mouth daily as needed.       olopatadine (PATANOL) 0.1 % ophthalmic solution Administer 2 Drops to both eyes two (2) times a day. 5 mL 1    fentaNYL 37.5 mcg/hour pt72 1 Patch by TransDERmal route every fourty-eight (48) hours. Max Daily Amount: 1 Patch. for chronic, severe, refractory pain. Mylan, Sandoz, or Mallinckrodt brands preferred 15 Patch 0    fentaNYL 37.5 mcg/hour pt72 1 Patch by TransDERmal route every fourty-eight (48) hours. Max Daily Amount: 1 Patch. 15 Patch 0    traZODone (DESYREL) 50 mg tablet TAKE 2 TABS BY MOUTH NIGHTLY. AS NEEDED FOR INSOMNIA 60 Tab 3     No current facility-administered medications on file prior to visit.          Allergies   Allergen Reactions    Adhesive Rash    Aspirin Other (comments) and Nausea and Vomiting     G6PD  GI BLEED AND ULCER    Contrast Agent [Iodine] Rash     Allergic to CT Dye    Dilantin [Phenytoin Sodium Extended] Other (comments)     Difficulty waking    Iodinated Contrast- Oral And Iv Dye Rash     \"bumps on face\"    Lipitor [Atorvastatin] Other (comments)     PKU     Pcn [Penicillins] Rash and Hives     \"sores all over the body\"    Phenytoin Unknown (comments)     \"Trouble waking up\"    Sulfa (Sulfonamide Antibiotics) Rash and Nausea and Vomiting     G6PD  G6PD    Tegretol [Carbamazepine] Other (comments) and Vertigo     \"Trouble waking up\"  Difficulty waking up         Health Maintenance:   Health Maintenance   Topic Date Due    Pneumococcal 19-64 Highest Risk (1 of 3 - PCV13) 12/23/1976    BREAST CANCER SCRN MAMMOGRAM  10/29/2015    Influenza Age 9 to Adult  08/01/2017    ZOSTER VACCINE AGE 60>  10/23/2017    PAP AKA CERVICAL CYTOLOGY  12/13/2019    COLONOSCOPY  03/15/2023    DTaP/Tdap/Td series (2 - Td) 04/01/2025    Hepatitis C Screening  Completed       Objective:  Visit Vitals    /86 (BP 1 Location: Left arm, BP Patient Position: Sitting)    Pulse 84    Temp 98.1 °F (36.7 °C) (Oral)    Resp 20    Ht 5' 2\" (1.575 m)    Wt 162 lb (73.5 kg)    LMP 08/28/2002  SpO2 95%    BMI 29.63 kg/m2          Nurses notes and VS reviewed. Physical Examination: General appearance - alert, well appearing, and in no distress  Ears - bilateral TM's and external ear canals normal  Nose - normal and patent, no erythema, discharge or polyps  Mouth - erythematous  Neck - supple, no significant adenopathy  Chest - clear to auscultation, no wheezes, rales or rhonchi, symmetric air entry  Heart - normal rate, regular rhythm, normal S1, S2, no murmurs, rubs, clicks or gallops          Labwork and Ancillary Studies:    CBC w/Diff  Lab Results   Component Value Date/Time    WBC 5.8 10/06/2016 04:02 PM    HGB 12.2 10/06/2016 04:02 PM    PLATELET 206 59/19/8670 04:02 PM         Basic Metabolic Profile  Lab Results   Component Value Date/Time    Sodium 139 10/06/2016 04:02 PM    Potassium 4.4 10/06/2016 04:02 PM    Chloride 98 10/06/2016 04:02 PM    CO2 27 10/06/2016 04:02 PM    Anion gap 8 11/24/2015 10:47 AM    Glucose 94 10/06/2016 04:02 PM    BUN 9 10/06/2016 04:02 PM    Creatinine 1.02 10/06/2016 04:02 PM    BUN/Creatinine ratio 9 10/06/2016 04:02 PM    GFR est AA 70 10/06/2016 04:02 PM    GFR est non-AA 61 10/06/2016 04:02 PM    Calcium 9.4 10/06/2016 04:02 PM         LFT  Lab Results   Component Value Date/Time    ALT (SGPT) 20 11/24/2015 10:47 AM    AST (SGOT) 15 11/24/2015 10:47 AM    Alk.  phosphatase 78 11/24/2015 10:47 AM    Bilirubin, direct 0.1 11/24/2015 10:47 AM    Bilirubin, total 0.4 11/24/2015 10:47 AM         Cholesterol  Lab Results   Component Value Date/Time    Cholesterol, total 278 11/24/2015 10:47 AM    HDL Cholesterol 47 11/24/2015 10:47 AM    LDL, calculated 209.8 11/24/2015 10:47 AM    Triglyceride 106 11/24/2015 10:47 AM    CHOL/HDL Ratio 5.9 11/24/2015 10:47 AM

## 2018-01-30 ENCOUNTER — TELEPHONE (OUTPATIENT)
Dept: FAMILY MEDICINE CLINIC | Age: 63
End: 2018-01-30

## 2018-01-30 NOTE — TELEPHONE ENCOUNTER
Patient called and is requesting a call back from Dr Jesus Abraham she was seen 1/23 and diagnosed with a sinus infection but patient says she thinks that the infection has gone to her brain she has severe head pressure and balance has been off last 5 days, also her eyes are on fire and wants to speak with the doctor right away and be advised if she should go to the ER

## 2018-01-30 NOTE — TELEPHONE ENCOUNTER
I reviewed patients message with Dr. Annelise Ramirez its is very unlikely the infection has spread to her brain. balance issues and head pressure are caused from sinus infections but if she feels this badly then should be evaluated By the Er also recommended referring her to ENT . Patient voiced understanding but out of all the sinus infections she has had the pressure has never been this bad and caused balance and issues with thinking.  I told patient given what she has told me going to the er for evaluation would be best she agreed and will call if she wants to be referred to ent after er evaluation

## 2018-02-08 DIAGNOSIS — C50.911 MALIGNANT NEOPLASM OF RIGHT FEMALE BREAST, UNSPECIFIED ESTROGEN RECEPTOR STATUS, UNSPECIFIED SITE OF BREAST (HCC): Primary | ICD-10-CM

## 2018-02-23 ENCOUNTER — OFFICE VISIT (OUTPATIENT)
Dept: FAMILY MEDICINE CLINIC | Age: 63
End: 2018-02-23

## 2018-02-23 ENCOUNTER — HOSPITAL ENCOUNTER (OUTPATIENT)
Dept: LAB | Age: 63
Discharge: HOME | End: 2018-02-23

## 2018-02-23 VITALS
RESPIRATION RATE: 20 BRPM | OXYGEN SATURATION: 99 % | HEART RATE: 75 BPM | HEIGHT: 62 IN | DIASTOLIC BLOOD PRESSURE: 90 MMHG | BODY MASS INDEX: 29.44 KG/M2 | WEIGHT: 160 LBS | TEMPERATURE: 97.9 F | SYSTOLIC BLOOD PRESSURE: 158 MMHG

## 2018-02-23 DIAGNOSIS — E50.7 XEROPHTHALMIA: ICD-10-CM

## 2018-02-23 DIAGNOSIS — E03.9 HYPOTHYROIDISM, UNSPECIFIED TYPE: ICD-10-CM

## 2018-02-23 DIAGNOSIS — Z23 ENCOUNTER FOR IMMUNIZATION: ICD-10-CM

## 2018-02-23 DIAGNOSIS — M25.60 JOINT STIFFNESS: Primary | ICD-10-CM

## 2018-02-23 DIAGNOSIS — R53.1 EPISODIC WEAKNESS: ICD-10-CM

## 2018-02-23 PROCEDURE — 99001 SPECIMEN HANDLING PT-LAB: CPT | Performed by: INTERNAL MEDICINE

## 2018-02-23 RX ORDER — GLUCOSAM/CHONDRO/HERB 149/HYAL 750-100 MG
1 TABLET ORAL DAILY
COMMUNITY

## 2018-02-23 NOTE — MR AVS SNAPSHOT
Babar Finch 
 
 
 [address] [facility] [id number] [phone] Patient: Kristina Fox MRN: [id number] Visit Information Date & Time Provider Department Dept. Phone Encounter #  
 2018  [phone] [id number] Your Appointments 3/5/2018  1:20 PM  
Follow Up with CORY Weston [address] [place] [address] for Pain Management (WOLF SCHEDULING) Appt Note: 3 mon f/u per gs. ..to  
 30 [facility] [id number] [phone] [id number]  
  
    
 3/5/2018  1:40 PM  
Follow Up with CORY Weston [address] for Pain Management (WOLF SCHEDULING) Appt Note: w/c  appt 30 [facility] [address] [phone] Upcoming Health Maintenance Date Due Pneumococcal 19-64 Highest Risk (1 of 3 - PCV13) 1976 BREAST CANCER SCRN MAMMOGRAM 10/29/2015 Influenza Age 5 to Adult 2017 ZOSTER VACCINE AGE 60> 10/23/2017 PAP AKA CERVICAL CYTOLOGY 2019 COLONOSCOPY 3/15/2023 DTaP/Tdap/Td series (2 - Td) 2025 Allergies as of 2018  Review Complete On: 2018 By: Kam Penny LPN Severity Noted Reaction Type Reactions Adhesive    Rash Aspirin  2011    Other (comments), Nausea and Vomiting G6PD 
GI BLEED AND ULCER Contrast Agent [Iodine]    Rash Allergic to CT Dye  
 Dilantin [Phenytoin Sodium Extended]    Other (comments) Difficulty waking Iodinated Contrast- Oral And Iv Dye  2016    Rash \"bumps on face\" Lipitor [Atorvastatin]  2016    Other (comments) PKU Pcn [Penicillins]    Rash, Hives \"sores all over the body\" Phenytoin  2016    Unknown (comments) \"Trouble waking up\" Sulfa (Sulfonamide Antibiotics)    Rash, Nausea and Vomiting G6PD 
G6PD Tegretol [Carbamazepine]    Other (comments), Vertigo \"Trouble waking up\" Difficulty waking up Current Immunizations  Reviewed on 11/25/2015 Name Date Influenza Vaccine 11/25/2015  7:48 AM  
 Tdap 4/1/2015 Not reviewed this visit You Were Diagnosed With   
  
 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  
 29.26 kg/m 2 1.78 m 2 Preferred Pharmacy Pharmacy Name Phone [address] [phone] Your Updated Medication List  
  
   
This list is accurate as of 2/23/18  2:23 PM.  Always use your most recent med list.  
  
  
  
  
 acetaminophen 500 mg tablet Commonly known as:  TYLENOL Take  by mouth every six (6) hours as needed for Pain. CLARITIN 10 mg tablet Generic drug:  loratadine Take 10 mg by mouth daily as needed. diclofenac 3 % topical gel Commonly known as:  Donna Melissaers Apply 2-4 g to affected area two (2) times a day. As needed for joint pain. Apply to painful areas * fentaNYL 37.5 mcg/hour Pt72  
1 Patch by TransDERmal route every fourty-eight (48) hours. Max Daily Amount: 1 Patch. * fentaNYL 37.5 mcg/hour Pt72  
1 Patch by TransDERmal route every fourty-eight (48) hours. Max Daily Amount: 1 Patch. for chronic, severe, refractory pain.  Mikaela Bond, or Mallinckrodt brands preferred * fentaNYL 37.5 mcg/hour Pt72  
1 Patch by TransDERmal route every fourty-eight (48) hours. Max Daily Amount: 1 Patch. fluticasone 50 mcg/actuation nasal spray Commonly known as:  Jeppie Tucker 2 Sprays by Both Nostrils route daily. levothyroxine 100 mcg tablet Commonly known as:  SYNTHROID  
TAKE 1 TABLET BY MOUTH DAILY (BEFORE BREAKFAST) lidocaine 5 % ointment Commonly known as:  XYLOCAINE Apply 1-2 g to affected area as needed for Pain. To painful areas  
  
 methocarbamol 750 mg tablet Commonly known as:  ROBAXIN  
TAKE 1 TAB BY MOUTH THREE (3) TIMES DAILY. AS NEEDED FOR MUSCLE SPASMS MOVANTIK 12.5 mg Tab tablet Generic drug:  naloxegol Take 12.5 mg by mouth Daily (before breakfast). NASONEX 50 mcg/actuation nasal spray Generic drug:  mometasone 2 Sprays daily as needed. olopatadine 0.1 % ophthalmic solution Commonly known as:  PATANOL Administer 2 Drops to both eyes two (2) times a day. Omega-3-DHA-EPA-Fish Oil 1,000 mg (120 mg-180 mg) Cap Take  by mouth. ondansetron 8 mg disintegrating tablet Commonly known as:  ZOFRAN ODT Take 1 Tab by mouth every eight (8) hours as needed for Nausea. traMADol 50 mg tablet Commonly known as:  ULTRAM  
Take 2 Tabs by mouth three (3) times daily as needed for Pain. Max Daily Amount: 300 mg.  
  
 traZODone 50 mg tablet Commonly known as:  DESYREL  
TAKE 2 TABS BY MOUTH NIGHTLY. AS NEEDED FOR INSOMNIA  
  
 XIIDRA 5 % Dpet Generic drug:  lifitegrast  
Apply  to eye. ZANTAC 150 mg tablet Generic drug:  raNITIdine Take 150 mg by mouth daily as needed. * Notice: This list has 3 medication(s) that are the same as other medications prescribed for you. Read the directions carefully, and ask your doctor or other care provider to review them with you. We Performed the Following REFERRAL TO RHEUMATOLOGY [TTR13 Custom] Referral Information Referral ID Referred By Referred To  
  
 [id number] [facility] [address] [place] [address] [place] Phone: [phone] Fax: [phone] Visits Status Start Date End Date 1 New Request 2/23/18 2/23/19 If your referral has a status of pending review or denied, additional information will be sent to support the outcome of this decision. Patient Instructions Dry Eyes: Care Instructions Your Care Instructions Dry eyes can be uncomfortable. The dryness may make your eyes feel dry or hot. Your eyes may also water a lot. In some cases, dry eyes make it feel like there is sand or dirt in your eyes. From time to time, dry eyes may cause you to have blurry vision. But dry eyes don't usually cause lasting problems with vision. There are many causes of dry eyes. Sometimes dry weather, smoke, or pollution can bother the eyes. Other times, allergies or contact lenses irritate the eyes. Older people often have dry eyes because our eyes do not make as many tears as we age. In some cases, diseases can cause dry eyes. These include rheumatoid arthritis, lupus, and Sjögren's syndrome. In other cases, medicines are to blame. Your doctor may want to do tests to help find the cause of your dry eyes. You can work with your doctor to find ways to help your eyes feel better. Home treatment often helps. Follow-up care is a key part of your treatment and safety. Be sure to make and go to all appointments, and call your doctor if you are having problems. It's also a good idea to know your test results and keep a list of the medicines you take. How can you care for yourself at home? · Take breaks often when you read, watch TV, or use a computer. Close your eyes. Do not rub your eyes. Artificial tears may help you when you do these activities. You can buy these without a prescription. · Avoid smoke and other things that irritate the eyes. · Wear sunglasses that wrap around the sides of the head. These can protect the eyes from sun, wind, dust, and dirt. · Use a vaporizer or humidifier to add moisture to your bedroom. Follow the directions for cleaning the machine. · Do not use fans while you sleep. · If you usually wear contact lenses, use rewetting drops or wear your glasses until your eyes feel better. · Be safe with medicines. Take your medicine exactly as prescribed. Call your doctor if you think you are having a problem with your medicine. · Try using artificial tears at least 4 times a day. · If you need drops more than 4 times a day, use artificial tears without preservatives. They may irritate the eyes less. · Use a lubricating eye ointment or eye gel at bedtime. These are thicker and last longer, so you may have less burning, dryness, and itching when you wake up. Be aware that they may blur your vision for a short time. · To put in eyedrops or ointment: ¨ Tilt your head back, and pull the lower eyelid down with one finger. ¨ Drop or squirt the medicine inside the lower lid. ¨ Close your eye for 30 to 60 seconds to let the drops or ointment move around. ¨ Do not touch the ointment or dropper tip to your eyelashes or any other surface. · Put a warm, moist cloth on your eyelids every morning for about 5 minutes. Then massage your eyelids lightly. This helps increase the natural wetness of your eyes. When should you call for help? Watch closely for changes in your health, and be sure to contact your doctor if: 
? · Your eyes are still dry, irritated, or teary, and artificial tears do not help. ? · You do not get better as expected. Where can you learn more? Go to http://celia-staci.info/. Enter D231 in the search box to learn more about \"Dry Eyes: Care Instructions. \" Current as of: March 3, 2017 Content Version: 11.4 © 8951-3812 Healthwise, Incorporated. Care instructions adapted under license by [employer] (which disclaims liability or warranty for this information). If you have questions about a medical condition or this instruction, always ask your healthcare professional. [address] any warranty or liability for your use of this information. Introducing \[facility]\"" & HEALTH SERVICES! Barbara Joyner introduces [employer] patient portal. Now you can access parts of your medical record, email your doctor's office, and request medication refills online. 1. In your internet browser, go to [email or web address] 2. Click on the First Time User? Click Here link in the Sign In box. You will see the New Member Sign Up page. 3. Enter your [employer] Access Code exactly as it appears below. You will not need to use this code after youve completed the sign-up process. If you do not sign up before the expiration date, you must request a new code. · [employer] Access Code: [email or web address] Expires: 4/3/2018  4:06 PM 
 
4. Enter the last four digits of your Social Security Number (xxxx) and Date of Birth (mm/dd/yyyy) as indicated and click Submit. You will be taken to the next sign-up page. 5. Create a [employer] ID. This will be your [employer] login ID and cannot be changed, so think of one that is secure and easy to remember. 6. Create a [employer] password. You can change your password at any time. 7. Enter your Password Reset Question and Answer. This can be used at a later time if you forget your password. 8. Enter your e-mail address. You will receive e-mail notification when new information is available in [address] 9. Click Sign Up. You can now view and download portions of your medical record. 10. Click the Download Summary menu link to download a portable copy of your medical information.  
 
If you have questions, please visit the Frequently Asked Questions section of the [employer]. Remember, [employer] is NOT to be used for urgent needs. For medical emergencies, dial 911. Now available from your iPhone and Android! Please provide this summary of care documentation to your next provider. Your primary care clinician is listed as Mahesh 51. If you have any questions after today's visit, please call [phone].

## 2018-02-23 NOTE — PROGRESS NOTES
Shaniqua Bronson is a 63 y.o. female is here to follow up after having been seen in the ER. 1. Have you been to the ER, urgent care clinic since your last visit? Hospitalized since your last visit? [facility] for same issues. 2. Have you seen or consulted any other health care providers outside of the [place] [address] since your last visit? Include any pap smears or colon screening. ENT eye doctor, Cee Abad Dr Maintenance Due   Topic Date Due    Pneumococcal 19-64 Highest Risk (1 of 3 - PCV13) 12/23/1976    BREAST CANCER SCRN MAMMOGRAM  10/29/2015    Influenza Age 9 to Adult  08/01/2017    ZOSTER VACCINE AGE 60>  10/23/2017       Per verbal orders of Dr. Annelise Ramirez, injection of flu shot given by Corazon Carrillo LPN. Patient instructed to remain in clinic for 20 minutes afterwards, and to report any adverse reaction to me immediately. Vaccine documentation completed. Tolerated well. Consent signed.

## 2018-02-23 NOTE — PATIENT INSTRUCTIONS
Dry Eyes: Care Instructions  Your Care Instructions    Dry eyes can be uncomfortable. The dryness may make your eyes feel dry or hot. Your eyes may also water a lot. In some cases, dry eyes make it feel like there is sand or dirt in your eyes. From time to time, dry eyes may cause you to have blurry vision. But dry eyes don't usually cause lasting problems with vision. There are many causes of dry eyes. Sometimes dry weather, smoke, or pollution can bother the eyes. Other times, allergies or contact lenses irritate the eyes. Older people often have dry eyes because our eyes do not make as many tears as we age. In some cases, diseases can cause dry eyes. These include rheumatoid arthritis, lupus, and Sjögren's syndrome. In other cases, medicines are to blame. Your doctor may want to do tests to help find the cause of your dry eyes. You can work with your doctor to find ways to help your eyes feel better. Home treatment often helps. Follow-up care is a key part of your treatment and safety. Be sure to make and go to all appointments, and call your doctor if you are having problems. It's also a good idea to know your test results and keep a list of the medicines you take. How can you care for yourself at home? · Take breaks often when you read, watch TV, or use a computer. Close your eyes. Do not rub your eyes. Artificial tears may help you when you do these activities. You can buy these without a prescription. · Avoid smoke and other things that irritate the eyes. · Wear sunglasses that wrap around the sides of the head. These can protect the eyes from sun, wind, dust, and dirt. · Use a vaporizer or humidifier to add moisture to your bedroom. Follow the directions for cleaning the machine. · Do not use fans while you sleep. · If you usually wear contact lenses, use rewetting drops or wear your glasses until your eyes feel better. · Be safe with medicines. Take your medicine exactly as prescribed.  Call your doctor if you think you are having a problem with your medicine. · Try using artificial tears at least 4 times a day. · If you need drops more than 4 times a day, use artificial tears without preservatives. They may irritate the eyes less. · Use a lubricating eye ointment or eye gel at bedtime. These are thicker and last longer, so you may have less burning, dryness, and itching when you wake up. Be aware that they may blur your vision for a short time. · To put in eyedrops or ointment:  ¨ Tilt your head back, and pull the lower eyelid down with one finger. ¨ Drop or squirt the medicine inside the lower lid. ¨ Close your eye for 30 to 60 seconds to let the drops or ointment move around. ¨ Do not touch the ointment or dropper tip to your eyelashes or any other surface. · Put a warm, moist cloth on your eyelids every morning for about 5 minutes. Then massage your eyelids lightly. This helps increase the natural wetness of your eyes. When should you call for help? Watch closely for changes in your health, and be sure to contact your doctor if:  ? · Your eyes are still dry, irritated, or teary, and artificial tears do not help. ? · You do not get better as expected. Where can you learn more? Go to http://celia-staci.info/. Enter D231 in the search box to learn more about \"Dry Eyes: Care Instructions. \"  Current as of: March 3, 2017  Content Version: 11.4  © 2170-0061 [employer]. Care instructions adapted under license by [employer] (which disclaims liability or warranty for this information). If you have questions about a medical condition or this instruction, always ask your healthcare professional. [place] [address] any warranty or liability for your use of this information. Vaccine Information Statement    Influenza (Flu) Vaccine (Inactivated or Recombinant):  What you need to know    Many Vaccine Information Statements are available in Malagasy and other languages. See www.immunize.org/vis  Hojas de Información Sobre Vacunas están disponibles en Español y en muchos otros idiomas. Visite www.immunize.org/vis    1. Why get vaccinated? Influenza (flu) is a contagious disease that spreads around the [facility] every year, usually between October and May. Flu is caused by influenza viruses, and is spread mainly by coughing, sneezing, and close contact. Anyone can get flu. Flu strikes suddenly and can last several days. Symptoms vary by age, but can include:   fever/chills   sore throat   muscle aches   fatigue   cough   headache    runny or stuffy nose    Flu can also lead to pneumonia and blood infections, and cause diarrhea and seizures in children. If you have a medical condition, such as heart or lung disease, flu can make it worse. Flu is more dangerous for some people. Infants and young children, people 72years of age and older, pregnant women, and people with certain health conditions or a weakened immune system are at greatest risk. Each year thousands of people in the [facility] die from flu, and many more are hospitalized. Flu vaccine can:   keep you from getting flu,   make flu less severe if you do get it, and   keep you from spreading flu to your family and other people. 2. Inactivated and recombinant flu vaccines    A dose of flu vaccine is recommended every flu season. Children 6 months through 6years of age may need two doses during the same flu season. Everyone else needs only one dose each flu season. Some inactivated flu vaccines contain a very small amount of a mercury-based preservative called thimerosal. Studies have not shown thimerosal in vaccines to be harmful, but flu vaccines that do not contain thimerosal are available. There is no live flu virus in flu shots. They cannot cause the flu. There are many flu viruses, and they are always changing.  Each year a new flu vaccine is made to protect against three or four viruses that are likely to cause disease in the upcoming flu season. But even when the vaccine doesnt exactly match these viruses, it may still provide some protection    Flu vaccine cannot prevent:   flu that is caused by a virus not covered by the vaccine, or   illnesses that look like flu but are not. It takes about 2 weeks for protection to develop after vaccination, and protection lasts through the flu season. 3. Some people should not get this vaccine    Tell the person who is giving you the vaccine:     If you have any severe, life-threatening allergies. If you ever had a life-threatening allergic reaction after a dose of flu vaccine, or have a severe allergy to any part of this vaccine, you may be advised not to get vaccinated. Most, but not all, types of flu vaccine contain a small amount of egg protein.  If you ever had Guillain-Barré Syndrome (also called GBS). Some people with a history of GBS should not get this vaccine. This should be discussed with your doctor.  If you are not feeling well. It is usually okay to get flu vaccine when you have a mild illness, but you might be asked to come back when you feel better. 4. Risks of a vaccine reaction    With any medicine, including vaccines, there is a chance of reactions. These are usually mild and go away on their own, but serious reactions are also possible. Most people who get a flu shot do not have any problems with it. Minor problems following a flu shot include:    soreness, redness, or swelling where the shot was given     hoarseness   sore, red or itchy eyes   cough   fever   aches   headache   itching   fatigue  If these problems occur, they usually begin soon after the shot and last 1 or 2 days.      More serious problems following a flu shot can include the following:     There may be a small increased risk of Guillain-Barré Syndrome (GBS) after inactivated flu vaccine. This risk has been estimated at 1 or 2 additional cases per million people vaccinated. This is much lower than the risk of severe complications from flu, which can be prevented by flu vaccine.  Young children who get the flu shot along with pneumococcal vaccine (PCV13) and/or DTaP vaccine at the same time might be slightly more likely to have a seizure caused by fever. Ask your doctor for more information. Tell your doctor if a child who is getting flu vaccine has ever had a seizure. Problems that could happen after any injected vaccine:      People sometimes faint after a medical procedure, including vaccination. Sitting or lying down for about 15 minutes can help prevent fainting, and injuries caused by a fall. Tell your doctor if you feel dizzy, or have vision changes or ringing in the ears.  Some people get severe pain in the shoulder and have difficulty moving the arm where a shot was given. This happens very rarely.  Any medication can cause a severe allergic reaction. Such reactions from a vaccine are very rare, estimated at about 1 in a million doses, and would happen within a few minutes to a few hours after the vaccination. As with any medicine, there is a very remote chance of a vaccine causing a serious injury or death. The safety of vaccines is always being monitored. For more information, visit: www.cdc.gov/vaccinesafety/    5. What if there is a serious reaction? What should I look for?  Look for anything that concerns you, such as signs of a severe allergic reaction, very high fever, or unusual behavior. Signs of a severe allergic reaction can include hives, swelling of the face and throat, difficulty breathing, a fast heartbeat, dizziness, and weakness - usually within a few minutes to a few hours after the vaccination. What should I do?      If you think it is a severe allergic reaction or other emergency that cant wait, call 9-1-1 and get the person to the nearest hospital. Otherwise, call your doctor.  Reactions should be reported to the Vaccine Adverse Event Reporting System (VAERS). Your doctor should file this report, or you can do it yourself through  the VAERS web site at www.vaers. [facility].gov, or by calling 5-449.649.4082. VAERS does not give medical advice. 6. The National Vaccine Injury Compensation Program    The [facility] Vaccine Injury Compensation Program (VICP) is a federal program that was created to compensate people who may have been injured by certain vaccines. Persons who believe they may have been injured by a vaccine can learn about the program and about filing a claim by calling 5-320.354.1528 or visiting the [employer] website at www.[facility].gov/vaccinecompensation. There is a time limit to file a claim for compensation. 7. How can I learn more?  Ask your healthcare provider. He or she can give you the vaccine package insert or suggest other sources of information.  Call your local or state health department.  Contact the Centers for Disease Control and Prevention (CDC):  - Call 2-259.952.5963 (1-800-CDC-INFO) or  - Visit CDCs website at www.cdc.gov/flu    Vaccine Information Statement   Inactivated Influenza Vaccine   8/7/2015  42 MICHELLE Saab [id number]    Department of Health and Human Services  Centers for Disease Control and Prevention    Office Use Only

## 2018-02-24 LAB
T4 FREE SERPL-MCNC: 1.45 NG/DL (ref 0.82–1.77)
TSH SERPL DL<=0.005 MIU/L-ACNC: 0.41 UIU/ML (ref 0.45–4.5)

## 2018-02-25 NOTE — PROGRESS NOTES
Assessment/Plan:    *Diagnoses and all orders for this visit:    1. Joint stiffness  -     REFERRAL TO RHEUMATOLOGY    2. Episodic weakness  -     REFERRAL TO RHEUMATOLOGY    3. Xerophthalmia  -     REFERRAL TO RHEUMATOLOGY    4. Encounter for immunization  -     Influenza virus vaccine (QUADRIVALENT PRES FREE SYRINGE) IM (17297)    5. Hypothyroidism, unspecified type  -     TSH 3RD GENERATION; Future  -     T4, FREE; Future    Other orders  -     TSH 3RD GENERATION  -     T4, FREE        Will check thyroid and wait on recommendations form rheumatology. Will consider endocrinology and neurology to follow if needed. The plan was discussed with the patient. The patient verbalized understanding and is in agreement with the plan. All medication potential side effects were discussed with the patient.    -------------------------------------------------------------------------------------------------------------------        Shannan Sánchez is a 63 y.o. female and presents with Hospital Follow Up (ER follow up. )         Subjective:  Pt here for f/u after a recent visit to the ER. She was seen earlier this month for chest pain and SOB, headaches and elevated BP. She was ruled out for any cardiac origin. Had a   Normal stress echo and a VQ scan that was negative for PE. She has felt better in some ways but not in others. She continues to have episodes of feeling fatigued, a sense of weakness, joint stiffness, dry eyes. She has a known and treated condition of hypothyroidism and worries about this. ROS:  Constitutional: No recent weight change. No weakness/fatigue. No f/c. Skin: No rashes, change in nails/hair, itching   HENT: No HA, dizziness. No hearing loss/tinnitus. No nasal congestion/discharge. Eyes: No change in vision, double/blurred vision or eye pain/redness. Cardiovascular: No CP/palpitations. No SIMON/orthopnea/PND. Respiratory: No cough/sputum, dyspnea, wheezing.    Gastointestinal: No dysphagia, reflux. No n/v. No constipation/diarrhea. No melena/rectal bleeding. Genitourinary: No dysuria, urinary hesitancy, nocturia, hematuria. No incontinence. Musculoskeletal: No joint pain/stiffness. No muscle pain/tenderness. Endo: No heat/cold intolerance, no polyuria/polydypsia. Heme: No h/o anemia. No easy bleeding/bruising. Allergy/Immunology: No seasonal rhinitis. Denies frequent colds, sinus/ear infections. Neurological: No seizures/numbness/weakness. No paresthesias. Psychiatric:  No depression, anxiety. The problem list was updated as a part of today's visit.   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, T45.1X5A    Chest wall pain following surgery R07.89, G89.18    Renal insufficiency N28.9    Proteinuria R80.9    Chronic insomnia F51.04    Paroxysmal sleep G47.419    Encounter for long-term (current) use of high-risk medication Z79.899    Foraminal stenosis of lumbar region M99.83    Lumbar facet arthropathy M12.88    Lumbar post-laminectomy syndrome M96.1    Lumbar neuritis M54.16    Spasm of back muscles M62.830    Drug-induced constipation K59.03    Bilateral sacroiliitis (HCC) M46.1    Drug-induced nausea and vomiting R11.2, T50.905A    Irritable bowel syndrome with both constipation and diarrhea K58.2    Fibromyalgia M79.7    Anxiety F41.9    Depression F32.9    Hypersomnolence G47.10    Chronic pain syndrome G89.4    Recurrent depression (HCC) F33.9 The PSH, FH were reviewed. SH:  Social History   Substance Use Topics    Smoking status: Never Smoker    Smokeless tobacco: Never Used    Alcohol use No       Medications/Allergies:  Current Outpatient Prescriptions on File Prior to Visit   Medication Sig Dispense Refill    lifitegrast (XIIDRA) 5 % dpet Apply  to eye.  fluticasone (FLONASE) 50 mcg/actuation nasal spray 2 Sprays by Both Nostrils route daily. 1 Bottle 0    fentaNYL 37.5 mcg/hour pt72 1 Patch by TransDERmal route every fourty-eight (48) hours. Max Daily Amount: 1 Patch. 15 Patch 0    methocarbamol (ROBAXIN) 750 mg tablet TAKE 1 TAB BY MOUTH THREE (3) TIMES DAILY. AS NEEDED FOR MUSCLE SPASMS 90 Tab 2    traMADol (ULTRAM) 50 mg tablet Take 2 Tabs by mouth three (3) times daily as needed for Pain. Max Daily Amount: 300 mg. 180 Tab 2    diclofenac (SOLARAZE) 3 % topical gel Apply 2-4 g to affected area two (2) times a day. As needed for joint pain. Apply to painful areas 100 g 5    ondansetron (ZOFRAN ODT) 8 mg disintegrating tablet Take 1 Tab by mouth every eight (8) hours as needed for Nausea. 30 Tab 2    levothyroxine (SYNTHROID) 100 mcg tablet TAKE 1 TABLET BY MOUTH DAILY (BEFORE BREAKFAST) 90 Tab 2    acetaminophen (TYLENOL) 500 mg tablet Take  by mouth every six (6) hours as needed for Pain.  ranitidine (ZANTAC) 150 mg tablet Take 150 mg by mouth daily as needed.  naloxegol (MOVANTIK) 12.5 mg tab tablet Take 12.5 mg by mouth Daily (before breakfast).  olopatadine (PATANOL) 0.1 % ophthalmic solution Administer 2 Drops to both eyes two (2) times a day. 5 mL 1    fentaNYL 37.5 mcg/hour pt72 1 Patch by TransDERmal route every fourty-eight (48) hours. Max Daily Amount: 1 Patch. for chronic, severe, refractory pain. Mylan, Sandoz, or [place] brands preferred 15 Patch 0    fentaNYL 37.5 mcg/hour pt72 1 Patch by TransDERmal route every fourty-eight (48) hours. Max Daily Amount: 1 Patch.  15 Patch 0    traZODone (DESYREL) 50 mg tablet TAKE 2 TABS BY MOUTH NIGHTLY. AS NEEDED FOR INSOMNIA 60 Tab 3    lidocaine (XYLOCAINE) 5 % ointment Apply 1-2 g to affected area as needed for Pain. To painful areas 120 g 5    mometasone (NASONEX) 50 mcg/actuation nasal spray 2 Sprays daily as needed.  loratadine (CLARITIN) 10 mg tablet Take 10 mg by mouth daily as needed. No current facility-administered medications on file prior to visit. Allergies   Allergen Reactions    Adhesive Rash    Aspirin Other (comments) and Nausea and Vomiting     G6PD  GI BLEED AND ULCER    Contrast Agent [Iodine] Rash     Allergic to CT Dye    Dilantin [Phenytoin Sodium Extended] Other (comments)     Difficulty waking    Iodinated Contrast- Oral And Iv Dye Rash     \"bumps on face\"    Lipitor [Atorvastatin] Other (comments)     PKU     Pcn [Penicillins] Rash and Hives     \"sores all over the body\"    Phenytoin Unknown (comments)     \"Trouble waking up\"    Sulfa (Sulfonamide Antibiotics) Rash and Nausea and Vomiting     G6PD  G6PD    Tegretol [Carbamazepine] Other (comments) and Vertigo     \"Trouble waking up\"  Difficulty waking up         Health Maintenance:   Health Maintenance   Topic Date Due    Pneumococcal 19-64 Highest Risk (1 of 3 - PCV13) 12/23/1976    BREAST CANCER SCRN MAMMOGRAM  10/29/2015    ZOSTER VACCINE AGE 60>  10/23/2017    PAP AKA CERVICAL CYTOLOGY  12/13/2019    COLONOSCOPY  03/15/2023    DTaP/Tdap/Td series (2 - Td) 04/01/2025    Hepatitis C Screening  Completed    Influenza Age 5 to Adult  Addressed       Objective:  Visit Vitals    /90 (BP 1 Location: Left arm, BP Patient Position: Sitting)    Pulse 75    Temp 97.9 °F (36.6 °C) (Oral)    Resp 20    Ht 5' 2\" (1.575 m)    Wt 160 lb (72.6 kg)    LMP 08/28/2002    SpO2 99%    BMI 29.26 kg/m2          Nurses notes and VS reviewed.       Physical Examination: General appearance - alert, well appearing, and in no distress  Chest - clear to auscultation, no wheezes, rales or rhonchi, symmetric air entry  Heart - normal rate, regular rhythm, normal S1, S2, no murmurs, rubs, clicks or gallops        Labwork and Ancillary Studies:    CBC w/Diff  Lab Results   Component Value Date/Time    WBC 5.8 10/06/2016 04:02 PM    HGB 12.2 10/06/2016 04:02 PM    PLATELET 012 63/81/2149 04:02 PM         Basic Metabolic Profile  Lab Results   Component Value Date/Time    Sodium 139 10/06/2016 04:02 PM    Potassium 4.4 10/06/2016 04:02 PM    Chloride 98 10/06/2016 04:02 PM    CO2 27 10/06/2016 04:02 PM    Anion gap 8 11/24/2015 10:47 AM    Glucose 94 10/06/2016 04:02 PM    BUN 9 10/06/2016 04:02 PM    Creatinine 1.02 (H) 10/06/2016 04:02 PM    BUN/Creatinine ratio 9 10/06/2016 04:02 PM    GFR est AA 70 10/06/2016 04:02 PM    GFR est non-AA 61 10/06/2016 04:02 PM    Calcium 9.4 10/06/2016 04:02 PM         LFT  Lab Results   Component Value Date/Time    ALT (SGPT) 20 11/24/2015 10:47 AM    AST (SGOT) 15 11/24/2015 10:47 AM    Alk.  phosphatase 78 11/24/2015 10:47 AM    Bilirubin, direct 0.1 11/24/2015 10:47 AM    Bilirubin, total 0.4 11/24/2015 10:47 AM         Cholesterol  Lab Results   Component Value Date/Time    Cholesterol, total 278 (H) 11/24/2015 10:47 AM    HDL Cholesterol 47 11/24/2015 10:47 AM    LDL, calculated 209.8 (H) 11/24/2015 10:47 AM    Triglyceride 106 11/24/2015 10:47 AM    CHOL/HDL Ratio 5.9 (H) 11/24/2015 10:47 AM

## 2018-03-02 NOTE — TELEPHONE ENCOUNTER
Patient  Called to request refill on levothyroxine last seen 2/23/18 script last written 5/21/17 90 with 2 refills

## 2018-03-04 RX ORDER — LEVOTHYROXINE SODIUM 100 UG/1
TABLET ORAL
Qty: 90 TAB | Refills: 1 | Status: SHIPPED | OUTPATIENT
Start: 2018-03-04 | End: 2018-09-05 | Stop reason: SDUPTHER

## 2018-03-05 ENCOUNTER — OFFICE VISIT (OUTPATIENT)
Dept: PAIN MANAGEMENT | Age: 63
End: 2018-03-05

## 2018-03-05 VITALS
HEART RATE: 76 BPM | WEIGHT: 160 LBS | TEMPERATURE: 99.2 F | RESPIRATION RATE: 16 BRPM | BODY MASS INDEX: 29.44 KG/M2 | DIASTOLIC BLOOD PRESSURE: 82 MMHG | HEIGHT: 62 IN | SYSTOLIC BLOOD PRESSURE: 124 MMHG

## 2018-03-05 DIAGNOSIS — M54.41 CHRONIC MIDLINE LOW BACK PAIN WITH RIGHT-SIDED SCIATICA: ICD-10-CM

## 2018-03-05 DIAGNOSIS — G89.29 CHRONIC MIDLINE LOW BACK PAIN WITH RIGHT-SIDED SCIATICA: ICD-10-CM

## 2018-03-05 DIAGNOSIS — G89.4 CHRONIC PAIN SYNDROME: ICD-10-CM

## 2018-03-05 DIAGNOSIS — M96.1 POSTLAMINECTOMY SYNDROME, LUMBAR REGION: ICD-10-CM

## 2018-03-05 DIAGNOSIS — M51.37 DEGENERATION OF LUMBAR OR LUMBOSACRAL INTERVERTEBRAL DISC: ICD-10-CM

## 2018-03-05 RX ORDER — FENTANYL 37.5 UG/H
1 PATCH, EXTENDED RELEASE TRANSDERMAL
Qty: 15 PATCH | Refills: 0 | Status: SHIPPED | OUTPATIENT
Start: 2018-03-20 | End: 2018-04-19

## 2018-03-05 RX ORDER — ONDANSETRON 8 MG/1
8 TABLET, ORALLY DISINTEGRATING ORAL
Qty: 30 TAB | Refills: 2 | Status: SHIPPED | OUTPATIENT
Start: 2018-03-16 | End: 2018-06-05 | Stop reason: SDUPTHER

## 2018-03-05 RX ORDER — TRAMADOL HYDROCHLORIDE 50 MG/1
100 TABLET ORAL
Qty: 180 TAB | Refills: 2 | Status: SHIPPED | OUTPATIENT
Start: 2018-03-05 | End: 2018-06-05 | Stop reason: SDUPTHER

## 2018-03-05 RX ORDER — FENTANYL 37.5 UG/H
1 PATCH, EXTENDED RELEASE TRANSDERMAL
Qty: 15 PATCH | Refills: 0 | Status: SHIPPED | OUTPATIENT
Start: 2018-04-19 | End: 2018-06-05 | Stop reason: SDUPTHER

## 2018-03-05 RX ORDER — ONDANSETRON 8 MG/1
8 TABLET, ORALLY DISINTEGRATING ORAL
Qty: 30 TAB | Refills: 2 | Status: SHIPPED | OUTPATIENT
Start: 2018-03-16 | End: 2018-03-05 | Stop reason: SDUPTHER

## 2018-03-05 RX ORDER — CETIRIZINE HCL 10 MG
10 TABLET ORAL DAILY
COMMUNITY
End: 2018-10-19

## 2018-03-05 RX ORDER — BENZOCAINE .13; .15; .5; 2 G/100G; G/100G; G/100G; G/100G
GEL ORAL
COMMUNITY
End: 2018-06-06 | Stop reason: CLARIF

## 2018-03-05 RX ORDER — FENTANYL 37.5 UG/H
1 PATCH, EXTENDED RELEASE TRANSDERMAL
Qty: 15 PATCH | Refills: 0 | Status: SHIPPED | OUTPATIENT
Start: 2018-05-18 | End: 2018-06-05 | Stop reason: SDUPTHER

## 2018-03-05 NOTE — MR AVS SNAPSHOT
[address] [facility] [address] 
[phone] Patient: Monica Correa MRN: [id number] Visit Information Date & Time Provider Department Dept. Phone Encounter #  
 3/5/2018  1:20 PM Dav Smith, [address] [place] [address] for Pain Management [phone] Follow-up Instructions Return in about 3 months (around 2018). Upcoming Health Maintenance Date Due Pneumococcal 19-64 Highest Risk (1 of 3 - PCV13) 1976 BREAST CANCER SCRN MAMMOGRAM 10/29/2015 ZOSTER VACCINE AGE 60> 10/23/2017 PAP AKA CERVICAL CYTOLOGY 2019 COLONOSCOPY 3/15/2023 DTaP/Tdap/Td series (2 - Td) 2025 Allergies as of 3/5/2018  Review Complete On: 3/5/2018 By: CORY Dallas Severity Noted Reaction Type Reactions Adhesive    Rash Aspirin  2011    Other (comments), Nausea and Vomiting G6PD 
GI BLEED AND ULCER Contrast Agent [Iodine]    Rash Allergic to CT Dye  
 Dilantin [Phenytoin Sodium Extended]    Other (comments) Difficulty waking Iodinated Contrast- Oral And Iv Dye  2016    Rash \"bumps on face\" Lipitor [Atorvastatin]  2016    Other (comments) PKU Pcn [Penicillins]    Rash, Hives \"sores all over the body\" Phenytoin  2016    Unknown (comments) \"Trouble waking up\" Sulfa (Sulfonamide Antibiotics)    Rash, Nausea and Vomiting G6PD 
G6PD Tegretol [Carbamazepine]    Other (comments), Vertigo \"Trouble waking up\" Difficulty waking up Current Immunizations  Reviewed on 2018 Name Date Influenza Vaccine 2015  7:48 AM  
 Influenza Vaccine (Quad) PF 2018  2:28 PM  
 Tdap 2015 Not reviewed this visit You Were Diagnosed With   
  
 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   
 Postlaminectomy syndrome, lumbar region     ICD-10-CM: M96.1 ICD-9-CM: 722.83 Degeneration of lumbar or lumbosacral intervertebral disc     ICD-10-CM: M51.37 
ICD-9-CM: 722.52 Chronic pain syndrome     ICD-10-CM: G89.4 ICD-9-CM: 338. 4 Vitals BP Pulse Temp Resp Height(growth percentile) Weight(growth percentile) 124/82 (BP 1 Location: Left arm, BP Patient Position: Sitting) 76 99.2 °F (37.3 °C) 16 5' 2\" (1.575 m) 160 lb (72.6 kg) LMP BMI OB Status Smoking Status 08/28/2002 29.26 kg/m2 Postmenopausal Never Smoker BMI and BSA Data Body Mass Index Body Surface Area  
 29.26 kg/m 2 1.78 m 2 Preferred Pharmacy Pharmacy Name Phone [address] [phone] Your Updated Medication List  
  
   
This list is accurate as of 3/5/18  2:26 PM.  Always use your most recent med list.  
  
  
  
  
 acetaminophen 500 mg tablet Commonly known as:  TYLENOL Take  by mouth every six (6) hours as needed for Pain. cetirizine 10 mg tablet Commonly known as:  ZYRTEC Take 10 mg by mouth daily. CLARITIN 10 mg tablet Generic drug:  loratadine Take 10 mg by mouth daily as needed. diclofenac 3 % topical gel Commonly known as:  Lockwood Lias Apply 2-4 g to affected area two (2) times a day. As needed for joint pain. Apply to painful areas * fentaNYL 37.5 mcg/hour Pt72  
1 Patch by TransDERmal route every fourty-eight (48) hours for 30 days. Max Daily Amount: 1 Patch. Start taking on:  3/20/2018 * fentaNYL 37.5 mcg/hour Pt72  
1 Patch by TransDERmal route every fourty-eight (48) hours. Max Daily Amount: 1 Patch. for chronic, severe, refractory pain. Mylan, Sandoz, or Mallinckrodt brands preferred Start taking on:  4/19/2018 * fentaNYL 37.5 mcg/hour Pt72  
1 Patch by TransDERmal route every fourty-eight (48) hours for 30 days. Max Daily Amount: 1 Patch. Start taking on:  5/18/2018  
  
 fluticasone 50 mcg/actuation nasal spray Commonly known as:  Celestia Esmer 2 Sprays by Both Nostrils route daily. levothyroxine 100 mcg tablet Commonly known as:  SYNTHROID  
TAKE 1 TABLET BY MOUTH DAILY (BEFORE BREAKFAST) lidocaine 5 % ointment Commonly known as:  XYLOCAINE Apply 1-2 g to affected area as needed for Pain. To painful areas  
  
 methocarbamol 750 mg tablet Commonly known as:  ROBAXIN  
TAKE 1 TAB BY MOUTH THREE (3) TIMES DAILY. AS NEEDED FOR MUSCLE SPASMS MOVANTIK 12.5 mg Tab tablet Generic drug:  naloxegol Take 12.5 mg by mouth Daily (before breakfast). NASONEX 50 mcg/actuation nasal spray Generic drug:  mometasone 2 Sprays daily as needed. olopatadine 0.1 % ophthalmic solution Commonly known as:  PATANOL Administer 2 Drops to both eyes two (2) times a day. Omega-3-DHA-EPA-Fish Oil 1,000 mg (120 mg-180 mg) Cap Take  by mouth. ondansetron 8 mg disintegrating tablet Commonly known as:  ZOFRAN ODT Take 1 Tab by mouth every eight (8) hours as needed for Nausea. Start taking on:  3/16/2018 RHINOCORT AQUA 32 mcg/actuation nasal spray Generic drug:  budesonide  
  
 traMADol 50 mg tablet Commonly known as:  ULTRAM  
Take 2 Tabs by mouth three (3) times daily as needed for Pain. Max Daily Amount: 300 mg.  
  
 traZODone 50 mg tablet Commonly known as:  DESYREL  
TAKE 2 TABS BY MOUTH NIGHTLY. AS NEEDED FOR INSOMNIA  
  
 XIIDRA 5 % Dpet Generic drug:  lifitegrast  
Apply  to eye. ZANTAC 150 mg tablet Generic drug:  raNITIdine Take 150 mg by mouth daily as needed. * Notice: This list has 3 medication(s) that are the same as other medications prescribed for you. Read the directions carefully, and ask your doctor or other care provider to review them with you. Prescriptions Printed Refills  
 fentaNYL 37.5 mcg/hour pt72 0 Starting on: 3/20/2018 Si Patch by TransDERmal route every fourty-eight (48) hours for 30 days. Max Daily Amount: 1 Patch. Class: Print Route: TransDERmal  
 fentaNYL 37.5 mcg/hour pt72 0 Starting on: 2018 Si Patch by TransDERmal route every fourty-eight (48) hours. Max Daily Amount: 1 Patch. for chronic, severe, refractory pain. Mylan, Sandoz, or Mallinckrodt brands preferred Class: Print Route: TransDERmal  
 fentaNYL 37.5 mcg/hour pt72 0 Starting on: 2018 Si Patch by TransDERmal route every fourty-eight (48) hours for 30 days. Max Daily Amount: 1 Patch. Class: Print Route: TransDERmal  
 traMADol (ULTRAM) 50 mg tablet 2 Sig: Take 2 Tabs by mouth three (3) times daily as needed for Pain. Max Daily Amount: 300 mg. Class: Print Route: Oral  
 ondansetron (ZOFRAN ODT) 8 mg disintegrating tablet 2 Starting on: 3/16/2018 Sig: Take 1 Tab by mouth every eight (8) hours as needed for Nausea. Class: Print Route: Oral  
  
Follow-up Instructions Return in about 3 months (around 2018). Patient Instructions 1. Continue current plan with no evidence of addiction or diversion. Stable on current medication without adverse events. 2. Continue methocarbamol 750 mg up to 3 times daily as needed. Has 4 refills left 3. DISContinue trazodone 50 mg.   
4. Refill fentanyl 37.5 mcg patch every 48 hours. 5. Continue diclofenac gel 3% 34 times daily 6. Continue Zofran 8 mg up to 3 times daily as needed for nausea. 2 refills. 7. Refill tramadol 12 tablets up to 3 times daily as needed. 8. Recommend continuing physical therapy at home 9. Naloxone 4 mg nasal spray for opioid induced respiratory depression emergency only. 10. Discussed risks of addiction, dependency, and opioid induced hyperalgesia. 11. Return to clinic in 3 months Introducing hospitals & [facility] SERVICES!    
 Dennis Melvin introduces [employer] patient portal. Now you can access parts of your medical record, email your doctor's office, and request medication refills online. 1. In your internet browser, go to [email or web address] 2. Click on the First Time User? Click Here link in the Sign In box. You will see the New Member Sign Up page. 3. Enter your [employer] Access Code exactly as it appears below. You will not need to use this code after youve completed the sign-up process. If you do not sign up before the expiration date, you must request a new code. · [employer] Access Code: [email or web address] Expires: 4/3/2018  4:06 PM 
 
4. Enter the last four digits of your Social Security Number (xxxx) and Date of Birth (mm/dd/yyyy) as indicated and click Submit. You will be taken to the next sign-up page. 5. Create a [employer] ID. This will be your [employer] login ID and cannot be changed, so think of one that is secure and easy to remember. 6. Create a [employer] password. You can change your password at any time. 7. Enter your Password Reset Question and Answer. This can be used at a later time if you forget your password. 8. Enter your e-mail address. You will receive e-mail notification when new information is available in [address] 9. Click Sign Up. You can now view and download portions of your medical record. 10. Click the Download Summary menu link to download a portable copy of your medical information. If you have questions, please visit the Frequently Asked Questions section of the [employer] website. Remember, [employer] is NOT to be used for urgent needs. For medical emergencies, dial 911. Now available from your iPhone and Android! Please provide this summary of care documentation to your next provider. Your primary care clinician is listed as Mahesh 51. If you have any questions after today's visit, please call [phone].

## 2018-03-05 NOTE — PROGRESS NOTES
HISTORY OF PRESENT ILLNESS  José Miguel Jones is a 63 y.o. female    HPI: Ms. Nichole Nurse  returns today for f/u of chronic low back pain due to lumbar postlaminectomy syndrome and polyarthralgias, related to an industrial accident in 12. H/o 3 lumbar surgeries, the last one was a lumbar fusion revision with Dr. Carolee Bobo on 5/3/16. Ms. Nichole Nurse is here today with her . She continues unchanged since last visit. She has been doing well with her current treatment plan. She is currently following up with her gastroenterologist as well. She reports that her Movantik was recently discontinued as it was causing significant diarrhea. She is currently using milk of magnesia and conservative treatments with seems to be helping well. She has a list of medical issues which we reviewed and discussed in the office today. I have asked her to continue to follow-up with her PCP to discuss these issues further. She reports continued nausea that she feels is associated with her pain. She reports that her nausea worsens during increased levels of pain. She has been using Zofran which has been controlling her nausea well. She is otherwise doing well with no new complaints today. We will continue with her current treatment plan with no changes. I will have her follow-up in 3 months or sooner if needed. Medications are helping with pain control and quality of life. Her pain is 4-7/10 with medication and 10/10 without. Pt describes pain as constant aching, burning, and crushing (in toes) and stabbing. Aggravating factors include most \"upright positions\" including sitting, standing, and walking. Relieved with rest, medication, and avoiding painful activities. Current treatment is helping to improve general activity, mood, walking, sleep, enjoyment of life    In the past 30 days, the patient reports approximately 50% pain relief with current treatment/medications. Pt does report constipation not well controlled.   She previously discontinued Amitiza due to side effects. She is following up with her gastroenterologist who has recommended milk of magnesia. She will continue to follow-up with her gastroenterologist for further recommendation. She  is otherwise doing well with no other complaints today. She denies any adverse events including nausea, vomiting, dizziness, increased constipation, hallucinations, or seizures. Because the patient's current regimen places him/her at increased risk for possible overdose, a prescription for naloxone nasal spray has been provided.   The patient understands that this medication is only to be used in the setting of a possible overdose and that inadvertent use of this medication could precipitate overt withdrawal.         Allergies   Allergen Reactions    Adhesive Rash    Aspirin Other (comments) and Nausea and Vomiting     G6PD  GI BLEED AND ULCER    Contrast Agent [Iodine] Rash     Allergic to CT Dye    Dilantin [Phenytoin Sodium Extended] Other (comments)     Difficulty waking    Iodinated Contrast- Oral And Iv Dye Rash     \"bumps on face\"    Lipitor [Atorvastatin] Other (comments)     PKU     Pcn [Penicillins] Rash and Hives     \"sores all over the body\"    Phenytoin Unknown (comments)     \"Trouble waking up\"    Sulfa (Sulfonamide Antibiotics) Rash and Nausea and Vomiting     G6PD  G6PD    Tegretol [Carbamazepine] Other (comments) and Vertigo     \"Trouble waking up\"  Difficulty waking up       Past Surgical History:   Procedure Laterality Date    HX BREAST RECONSTRUCTION  2012    HX BREAST RECONSTRUCTION      HX CARPAL TUNNEL RELEASE      HX MASTECTOMY  2012    right side    HX MASTECTOMY Right     HX MOHS PROCEDURES      HX ORTHOPAEDIC      rotator cuff repair on left- Dr. Lizzie Curran HX ORTHOPAEDIC  05/2016    decompression, spinal fusion    HX OTHER SURGICAL      spinal chord stimulator inssertion/ did not stay in         Review of Systems   Constitutional: Positive for malaise/fatigue. Negative for chills, fever and weight loss. HENT: Positive for sore throat. Negative for congestion. Eyes: Negative for blurred vision and double vision. Respiratory: Positive for cough, shortness of breath and wheezing. Current URI, on antibiotics   Cardiovascular: Negative for chest pain and palpitations. Gastrointestinal: Positive for constipation ( treated by gastroenterologist ), nausea and vomiting ( occasionally). Negative for abdominal pain, diarrhea and heartburn. Genitourinary: Negative for dysuria, frequency and urgency. Musculoskeletal: Positive for back pain, joint pain and myalgias. Negative for neck pain. Neurological: Positive for headaches. Negative for dizziness, seizures and loss of consciousness. Endo/Heme/Allergies: Does not bruise/bleed easily. Psychiatric/Behavioral: Negative for depression. The patient has insomnia. The patient is not nervous/anxious. All other systems reviewed and are negative. Physical Exam   Constitutional: She is oriented to person, place, and time and well-developed, well-nourished, and in no distress. No distress. HENT:   Head: Normocephalic and atraumatic. Eyes: EOM are normal.   Pulmonary/Chest: Effort normal.   Musculoskeletal:        Lumbar back: She exhibits decreased range of motion, tenderness and pain. Neurological: She is alert and oriented to person, place, and time. Gait ( using a walker) abnormal.   Skin: Skin is warm and dry. No rash noted. She is not diaphoretic. No erythema. Psychiatric: Mood, memory, affect and judgment normal.   Nursing note and vitals reviewed. ASSESSMENT:    1. Chronic midline low back pain with right-sided sciatica    2. Postlaminectomy syndrome, lumbar region    3. Degeneration of lumbar intervertebral disc    4.  Chronic pain syndrome           [place] Prescription Monitoring Program was reviewed which does not demonstrate aberrancies and/or inconsistencies with regard to the historical prescribing of controlled medications to this patient by other providers. PLAN / Pt Instructions:  1. Continue current plan with no evidence of addiction or diversion. Stable on current medication without adverse events. 2. Continue methocarbamol 750 mg up to 3 times daily as needed. Has 4 refills left  3. DISContinue trazodone 50 mg.    4. Refill fentanyl 37.5 mcg patch every 48 hours. 5. Continue diclofenac gel 3% 34 times daily  6. Continue Zofran 8 mg up to 3 times daily as needed for nausea. 2 refills. 7. Refill tramadol 12 tablets up to 3 times daily as needed. 8. Recommend continuing physical therapy at home  9. Naloxone 4 mg nasal spray for opioid induced respiratory depression emergency only. 10. Discussed risks of addiction, dependency, and opioid induced hyperalgesia. 11. Return to clinic in 3 months        Medications Ordered Today   Medications    fentaNYL 37.5 mcg/hour pt72     Si Patch by TransDERmal route every fourty-eight (48) hours for 30 days. Max Daily Amount: 1 Patch. Dispense:  15 Patch     Refill:  0    fentaNYL 37.5 mcg/hour pt72     Si Patch by TransDERmal route every fourty-eight (48) hours. Max Daily Amount: 1 Patch. for chronic, severe, refractory pain. Mylan, Sandoz, or Mallinckrodt brands preferred     Dispense:  15 Patch     Refill:  0    fentaNYL 37.5 mcg/hour pt72     Si Patch by TransDERmal route every fourty-eight (48) hours for 30 days. Max Daily Amount: 1 Patch. Dispense:  15 Patch     Refill:  0    traMADol (ULTRAM) 50 mg tablet     Sig: Take 2 Tabs by mouth three (3) times daily as needed for Pain. Max Daily Amount: 300 mg. Dispense:  180 Tab     Refill:  2    DISCONTD: ondansetron (ZOFRAN ODT) 8 mg disintegrating tablet     Sig: Take 1 Tab by mouth every eight (8) hours as needed for Nausea.      Dispense:  30 Tab     Refill:  2    ondansetron (ZOFRAN ODT) 8 mg disintegrating tablet     Sig: Take 1 Tab by mouth every eight (8) hours as needed for Nausea. Dispense:  30 Tab     Refill:  2       DISPOSITION   Pain medications are prescribed with the objective of pain relief and improved physical and psychosocial function in this patient.  Counseled patient on proper use of prescribed medications.  Counseled patient about chronic medical conditions and their relationship to anxiety and depression and recommended mental health support as needed.  Reviewed with patient self-help tools, home exercise, and lifestyle changes to assist the patient in self-management of symptoms.  Reviewed with patient the treatment plan, goals of treatment plan, and limitations of treatment plan, to include the potential for side effects from medications and procedures. If side effects occur, it is the responsibility of the patient to inform the clinic so that a change in the treatment plan can be made in a safe manner. The patient is advised that stopping prescribed medication may cause an increase in symptoms and possible medication withdrawal symptoms. The patient is informed an emergency room evaluation may be necessary if this occurs. Spent 25 minutes with patient today which more than 50% of that time was spent on counseling and coordination of care. Juany Willson 3/5/2018        Note: Please excuse any typographical errors. Voice recognition software was used for this note and may cause mistakes.

## 2018-03-05 NOTE — PATIENT INSTRUCTIONS
1. Continue current plan with no evidence of addiction or diversion. Stable on current medication without adverse events. 2. Continue methocarbamol 750 mg up to 3 times daily as needed. Has 4 refills left  3. DISContinue trazodone 50 mg.    4. Refill fentanyl 37.5 mcg patch every 48 hours. 5. Continue diclofenac gel 3% 34 times daily  6. Continue Zofran 8 mg up to 3 times daily as needed for nausea. 2 refills. 7. Refill tramadol 12 tablets up to 3 times daily as needed. 8. Recommend continuing physical therapy at home  9. Naloxone 4 mg nasal spray for opioid induced respiratory depression emergency only. 10. Discussed risks of addiction, dependency, and opioid induced hyperalgesia.    11. Return to clinic in 3 months

## 2018-03-05 NOTE — PROGRESS NOTES
Nursing Notes    Patient presents to the office today in follow-up. Patient rates her pain at 6/10 on the numerical pain scale. Reviewed medications with counts as follows:    Rx Date filled Qty Dispensed Pill Count Last Dose Short   Fentanyl 37.5 mg 02/19/18 15 8+1 on  Last pm no   Tramadol 50 mg 10/27/17 180 6 Last pm no         Comments: Patient is here today for a follow up appt today she states her pain level today is a 6  She states she was seen at [facility] and labs were taken and ct scan was done stress test was done   EKG was done as well she has seen ENT as well. She states she has seen her PCM as well . She states she was told she needs to see a neurologist   She states she went to the ER for chest pain and SOB. She states the pain went over to her right side where she had her cancer at. She has seen a rheumatologist as well. Patient had a lung scan was done also. POC UDS was not performed in office today    Any new labs or imaging since last appointment? YES labs taken at [facility], PCM. Ct scan was done, mammogramm was done     Have you been to an emergency room (ER) or urgent care clinic since your last visit? YES    SP[facility]        Have you been hospitalized since your last visit? NO     If yes, where, when, and reason for visit? Have you seen or consulted any other health care providers outside of the [address] [place] [address]  since your last visit? YES [facility], ENT, PCM, opthalmology, Oncology      If yes, where, when, and reason for visit? HM deferred to pcp.

## 2018-03-16 ENCOUNTER — TELEPHONE (OUTPATIENT)
Dept: FAMILY MEDICINE CLINIC | Age: 63
End: 2018-03-16

## 2018-03-16 NOTE — TELEPHONE ENCOUNTER
Patient called asking about thyroid results. Read her the letter. Pt also wanted the doctor to be aware she went to [facility] for tooth/jaw pain and they sent her to an endodontist because her problem was beyond their scope. That doctor stated pt has a jaw abscess and possible strept throat. Treated with a round of clindamycin. Root canal next week. Pt is wondering if this abscess could have caused her recent symptoms. Asked pt to have endodontist send us a copy of office note.

## 2018-03-16 NOTE — TELEPHONE ENCOUNTER
Well, if there is an infection in the body, it can sometimes have a more systemic reaction in people. I think her headaches and elevated BP were probably related the the abscess. Thanks for the update.

## 2018-04-06 DIAGNOSIS — C50.911 MALIGNANT NEOPLASM OF RIGHT FEMALE BREAST, UNSPECIFIED ESTROGEN RECEPTOR STATUS, UNSPECIFIED SITE OF BREAST (HCC): ICD-10-CM

## 2018-04-06 DIAGNOSIS — Z12.39 BREAST CANCER SCREENING: ICD-10-CM

## 2018-05-15 ENCOUNTER — HOSPITAL ENCOUNTER (OUTPATIENT)
Dept: LAB | Age: 63
Discharge: HOME | End: 2018-05-15

## 2018-05-15 ENCOUNTER — OFFICE VISIT (OUTPATIENT)
Dept: FAMILY MEDICINE CLINIC | Age: 63
End: 2018-05-15

## 2018-05-15 VITALS
DIASTOLIC BLOOD PRESSURE: 80 MMHG | HEIGHT: 62 IN | HEART RATE: 81 BPM | WEIGHT: 158.6 LBS | TEMPERATURE: 98.8 F | OXYGEN SATURATION: 98 % | SYSTOLIC BLOOD PRESSURE: 122 MMHG | RESPIRATION RATE: 8 BRPM | BODY MASS INDEX: 29.19 KG/M2

## 2018-05-15 DIAGNOSIS — E78.00 PURE HYPERCHOLESTEROLEMIA: ICD-10-CM

## 2018-05-15 DIAGNOSIS — H10.9 CONJUNCTIVITIS OF LEFT EYE, UNSPECIFIED CONJUNCTIVITIS TYPE: Primary | ICD-10-CM

## 2018-05-15 DIAGNOSIS — E55.9 HYPOVITAMINOSIS D: ICD-10-CM

## 2018-05-15 PROCEDURE — 99001 SPECIMEN HANDLING PT-LAB: CPT | Performed by: INTERNAL MEDICINE

## 2018-05-15 RX ORDER — CIPROFLOXACIN HYDROCHLORIDE 3.5 MG/ML
SOLUTION/ DROPS TOPICAL
Qty: 5 ML | Refills: 0 | Status: SHIPPED | OUTPATIENT
Start: 2018-05-15 | End: 2018-06-05 | Stop reason: ALTCHOICE

## 2018-05-15 NOTE — MR AVS SNAPSHOT
Jeremi Jordan 
 
 
 [address]  [address] [facility] [id number] [phone] Patient: Gulshan Roberts MRN: [id number] :1955 Visit Information Date & Time Provider Department Dept. Phone Encounter #  
 5/15/2018 10:45 AM Coreen Lovett [phone] [id number] Your Appointments 2018  1:20 PM  
Follow Up with CORY Claire [facility] ([facility]) Appt Note: Return in about 3 months (around 2018). [address] [facility] [address]  
[phone] [id number]  
  
    
 2018  1:40 PM  
Follow Up with CORY Claire [facility] ([facility]) Appt Note: Return in about 3 months (around 2018).  [address] [facility] [address]  
[phone] Upcoming Health Maintenance Date Due Pneumococcal 19-64 Highest Risk (1 of 3 - PCV13) 1976 ZOSTER VACCINE AGE 60> 10/23/2017 MEDICARE YEARLY EXAM 3/28/2018 Influenza Age 5 to Adult 2018 PAP AKA CERVICAL CYTOLOGY 2019 BREAST CANCER SCRN MAMMOGRAM 2020 COLONOSCOPY 3/15/2023 DTaP/Tdap/Td series (2 - Td) 2025 Allergies as of 5/15/2018  Review Complete On: 5/15/2018 By: Lauren Lam Severity Noted Reaction Type Reactions Adhesive    Rash Aspirin  2011    Other (comments), Nausea and Vomiting G6PD 
GI BLEED AND ULCER Contrast Agent [Iodine]    Rash Allergic to CT Dye  
 Dilantin [Phenytoin Sodium Extended]    Other (comments) Difficulty waking Iodinated Contrast- Oral And Iv Dye  2016    Rash \"bumps on face\" Lipitor [Atorvastatin]  2016    Other (comments) PKU Pcn [Penicillins]    Rash, Hives \"sores all over the body\" Phenytoin  2016    Unknown (comments) \"Trouble waking up\" Sulfa (Sulfonamide Antibiotics)    Rash, Nausea and Vomiting G6PD 
G6PD Tegretol [Carbamazepine]    Other (comments), Vertigo \"Trouble waking up\" Difficulty waking up Current Immunizations  Reviewed on 2/23/2018 Name Date Influenza Vaccine 11/25/2015  7:48 AM  
 Influenza Vaccine (Quad) PF 2/23/2018  2:28 PM  
 Tdap 4/1/2015 Not reviewed this visit You Were Diagnosed With   
  
 Codes Comments Pure hypercholesterolemia    -  Primary ICD-10-CM: E78.00 ICD-9-CM: 272.0 Hypovitaminosis D     ICD-10-CM: E55.9 ICD-9-CM: 268.9 Vitals BP Pulse Temp Resp Height(growth percentile) Weight(growth percentile) 122/80 (BP 1 Location: Left arm, BP Patient Position: Sitting) 81 98.8 °F (37.1 °C) (Oral) 8 5' 2\" (1.575 m) 158 lb 9.6 oz (71.9 kg) LMP SpO2 BMI OB Status Smoking Status 08/28/2002 98% 29.01 kg/m2 Postmenopausal Never Smoker BMI and BSA Data Body Mass Index Body Surface Area  
 29.01 kg/m 2 1.77 m 2 Preferred Pharmacy Pharmacy Name Phone [address] [facility] [address] [facility] [place] [phone] Your Updated Medication List  
  
   
This list is accurate as of 5/15/18 11:54 AM.  Always use your most recent med list.  
  
  
  
  
 acetaminophen 500 mg tablet Commonly known as:  TYLENOL Take  by mouth every six (6) hours as needed for Pain. cetirizine 10 mg tablet Commonly known as:  ZYRTEC Take 10 mg by mouth daily. ciprofloxacin HCl 0.3 % ophthalmic solution Commonly known as:  CILOXAN  
1 drop every 2 hours x 1 day then 1 drop every 6 hours for the remaining days to make 10 days. CLARITIN 10 mg tablet Generic drug:  loratadine Take 10 mg by mouth daily as needed. diclofenac 3 % topical gel Commonly known as:  Mattie Spencer Apply 2-4 g to affected area two (2) times a day. As needed for joint pain. Apply to painful areas * fentaNYL 37.5 mcg/hour Pt72  
1 Patch by TransDERmal route every fourty-eight (48) hours. Max Daily Amount: 1 Patch. for chronic, severe, refractory pain. Mylan, Sandoz, or Mallinckrodt brands preferred * fentaNYL 37.5 mcg/hour Pt72  
1 Patch by TransDERmal route every fourty-eight (48) hours for 30 days. Max Daily Amount: 1 Patch. Start taking on:  5/18/2018  
  
 fluticasone 50 mcg/actuation nasal spray Commonly known as:  Lyssa Coffee 2 Sprays by Both Nostrils route daily. levothyroxine 100 mcg tablet Commonly known as:  SYNTHROID  
TAKE 1 TABLET BY MOUTH DAILY (BEFORE BREAKFAST) lidocaine 5 % ointment Commonly known as:  XYLOCAINE Apply 1-2 g to affected area as needed for Pain. To painful areas  
  
 methocarbamol 750 mg tablet Commonly known as:  ROBAXIN  
TAKE 1 TAB BY MOUTH THREE (3) TIMES DAILY. AS NEEDED FOR MUSCLE SPASMS MOVANTIK 12.5 mg Tab tablet Generic drug:  naloxegol Take 12.5 mg by mouth Daily (before breakfast). NASONEX 50 mcg/actuation nasal spray Generic drug:  mometasone 2 Sprays daily as needed. olopatadine 0.1 % ophthalmic solution Commonly known as:  PATANOL Administer 2 Drops to both eyes two (2) times a day. omega 3-DHA-EPA-fish oil 1,000 mg (120 mg-180 mg) capsule Take  by mouth. ondansetron 8 mg disintegrating tablet Commonly known as:  ZOFRAN ODT Take 1 Tab by mouth every eight (8) hours as needed for Nausea. RHINOCORT AQUA 32 mcg/actuation nasal spray Generic drug:  budesonide  
  
 traMADol 50 mg tablet Commonly known as:  ULTRAM  
Take 2 Tabs by mouth three (3) times daily as needed for Pain. Max Daily Amount: 300 mg.  
  
 traZODone 50 mg tablet Commonly known as:  DESYREL  
TAKE 2 TABS BY MOUTH NIGHTLY. AS NEEDED FOR INSOMNIA  
  
 XIIDRA 5 % Dpet Generic drug:  lifitegrast  
Apply  to eye. ZANTAC 150 mg tablet Generic drug:  raNITIdine Take 150 mg by mouth daily as needed. * Notice: This list has 2 medication(s) that are the same as other medications prescribed for you. Read the directions carefully, and ask your doctor or other care provider to review them with you. Prescriptions Sent to Pharmacy Refills  
 ciprofloxacin HCl (CILOXAN) 0.3 % ophthalmic solution 0 Si drop every 2 hours x 1 day then 1 drop every 6 hours for the remaining days to make 10 days. Class: Normal  
 Pharm[facility] [address] [facility] [address] [facility]: [phone] To-Do List   
 05/15/2018 Lab:  CBC WITH AUTOMATED DIFF   
  
 05/15/2018 Lab:  HEPATIC FUNCTION PANEL   
  
 05/15/2018 Lab:  LIPID PANEL   
  
 05/15/2018 Lab:  METABOLIC PANEL, BASIC   
  
 05/15/2018 Lab:  T4, FREE   
  
 05/15/2018 Lab:  TSH 3RD GENERATION   
  
 05/15/2018 Lab:  URINALYSIS W/ RFLX MICROSCOPIC   
  
 05/15/2018 Lab:  VITAMIN D, 25 HYDROXY Patient Instructions High Cholesterol: Care Instructions Your Care Instructions Cholesterol is a type of fat in your blood. It is needed for many body functions, such as making new cells. Cholesterol is made by your body. It also comes from food you eat. High cholesterol means that you have too much of the fat in your blood. This raises your risk of a heart attack and stroke. LDL and HDL are part of your total cholesterol. LDL is the \"bad\" cholesterol. High LDL can raise your risk for heart disease, heart attack, and stroke. HDL is the \"good\" cholesterol. It helps clear bad cholesterol from the body. High HDL is linked with a lower risk of heart disease, heart attack, and stroke. Your cholesterol levels help your doctor find out your risk for having a heart attack or stroke. You and your doctor can talk about whether you need to lower your risk and what treatment is best for you.  
A heart-healthy lifestyle along with medicines can help lower your cholesterol and your risk. The way you choose to lower your risk will depend on how high your risk is for heart attack and stroke. It will also depend on how you feel about taking medicines. Follow-up care is a key part of your treatment and safety. Be sure to make and go to all appointments, and call your doctor if you are having problems. It's also a good idea to know your test results and keep a list of the medicines you take. How can you care for yourself at home? · Eat a variety of foods every day. Good choices include fruits, vegetables, whole grains (like oatmeal), dried beans and peas, nuts and seeds, soy products (like tofu), and fat-free or low-fat dairy products. · Replace butter, margarine, and hydrogenated or partially hydrogenated oils with olive and canola oils. (Canola oil margarine without trans fat is fine.) · Replace red meat with fish, poultry, and soy protein (like tofu). · Limit processed and packaged foods like chips, crackers, and cookies. · Bake, broil, or steam foods. Don't chavez them. · Be physically active. Get at least 30 minutes of exercise on most days of the week. Walking is a good choice. You also may want to do other activities, such as running, swimming, cycling, or playing tennis or team sports. · Stay at a healthy weight or lose weight by making the changes in eating and physical activity listed above. Losing just a small amount of weight, even 5 to 10 pounds, can reduce your risk for having a heart attack or stroke. · Do not smoke. When should you call for help? Watch closely for changes in your health, and be sure to contact your doctor if: 
? · You need help making lifestyle changes. ? · You have questions about your medicine. Where can you learn more? Go to http://celia-staci.info/. Enter R651 in the search box to learn more about \"High Cholesterol: Care Instructions. \" Current as of: September 21, 2016 Content Version: 11.4 © 3000-5705 Healthwise, Incorporated. Care instructions adapted under license by [employer] (which disclaims liability or warranty for this information). If you have questions about a medical condition or this instruction, always ask your healthcare professional. [address] any warranty or liability for your use of this information. Introducing [facility]! [facility] introduces Collect patient portal. Now you can access parts of your medical record, email your doctor's office, and request medication refills online. 1. In your internet browser, go to [email or web address] 2. Click on the First Time User? Click Here link in the Sign In box. You will see the New Member Sign Up page. 3. Enter your Collect Access Code exactly as it appears below. You will not need to use this code after youve completed the sign-up process. If you do not sign up before the expiration date, you must request a new code. · Collect Access Code: [email or web address] Expires: 8/13/2018 11:54 AM 
 
4. Enter the last four digits of your Social Security Number (xxxx) and Date of Birth (mm/dd/yyyy) as indicated and click Submit. You will be taken to the next sign-up page. 5. Create a Collect ID. This will be your Collect login ID and cannot be changed, so think of one that is secure and easy to remember. 6. Create a Collect password. You can change your password at any time. 7. Enter your Password Reset Question and Answer. This can be used at a later time if you forget your password. 8. Enter your e-mail address. You will receive e-mail notification when new information is available in [address] 9. Click Sign Up. You can now view and download portions of your medical record. 10. Click the Download Summary menu link to download a portable copy of your medical information.  
 
If you have questions, please visit the Frequently Asked Questions section of the [employer]. Remember, [employer] is NOT to be used for urgent needs. For medical emergencies, dial 911. Now available from your iPhone and Android! Please provide this summary of care documentation to your next provider. Your primary care clinician is listed as Mahesh 51. If you have any questions after today's visit, please call [phone].

## 2018-05-15 NOTE — PROGRESS NOTES
Parveen Robin is a 63 y.o. female (: 1955) presenting to address:    Chief Complaint   Patient presents with    Medication Evaluation     medication review after abx treatment with no imporvement, pt sent by endodontist dx with an abscess    Sinus Infection     with sore throat    Eye Drainage     with swelling       Vitals:    05/15/18 1114   BP: 122/80   Pulse: 81   Resp: 8   Temp: 98.8 °F (37.1 °C)   TempSrc: Oral   SpO2: 98%   Weight: 158 lb 9.6 oz (71.9 kg)   Height: 5' 2\" (1.575 m)   PainSc:   7   PainLoc: Back   LMP: 2002       Hearing/Vision:   No exam data present    Learning Assessment:     Learning Assessment 10/9/2017   PRIMARY LEARNER Patient   HIGHEST LEVEL OF EDUCATION - PRIMARY LEARNER  -   BARRIERS PRIMARY LEARNER -   CO-LEARNER CAREGIVER -   PRIMARY LANGUAGE ENGLISH   LEARNER PREFERENCE PRIMARY READING     LISTENING     PICTURES     VIDEOS     DEMONSTRATION   ANSWERED BY self   RELATIONSHIP SELF     Depression Screening:     PHQ over the last two weeks 5/15/2018   Little interest or pleasure in doing things Not at all   Feeling down, depressed or hopeless Not at all   Total Score PHQ 2 0     Fall Risk Assessment:     Fall Risk Assessment, last 12 mths 2017   Able to walk? Yes   Fall in past 12 months? No     Abuse Screening:     Abuse Screening Questionnaire 5/15/2018   Do you ever feel afraid of your partner? N   Are you in a relationship with someone who physically or mentally threatens you? N   Is it safe for you to go home? Y     Coordination of Care Questionaire:   1. Have you been to the ER, urgent care clinic since your last visit? Hospitalized since your last visit? NO    2. Have you seen or consulted any other health care providers outside of the [address] [place] [address] since your last visit? Include any pap smears or colon screening. NO    Advanced Directive:   1. Do you have an Advanced Directive? NO    2. Would you like information on Advanced Directives? NO

## 2018-05-15 NOTE — PATIENT INSTRUCTIONS

## 2018-05-15 NOTE — PROGRESS NOTES
Assessment/Plan:    *Diagnoses and all orders for this visit:    1. Conjunctivitis of left eye, unspecified conjunctivitis type    2. Pure hypercholesterolemia  -     CBC WITH AUTOMATED DIFF; Future  -     HEPATIC FUNCTION PANEL; Future  -     LIPID PANEL; Future  -     METABOLIC PANEL, BASIC; Future  -     TSH 3RD GENERATION; Future  -     T4, FREE; Future  -     URINALYSIS W/ RFLX MICROSCOPIC; Future    3. Hypovitaminosis D  -     VITAMIN D, 25 HYDROXY; Future    Other orders  -     ciprofloxacin HCl (CILOXAN) 0.3 % ophthalmic solution; 1 drop every 2 hours x 1 day then 1 drop every 6 hours for the remaining days to make 10 days. Will monitor the other symptoms b/c with all the rounds of Abx she has had, which came out to be 20+ days, she seems to be better. Will treat the LT eye and see how she responds. Physical in June. BW prior. The plan was discussed with the patient. The patient verbalized understanding and is in agreement with the plan. All medication potential side effects were discussed with the patient.    -------------------------------------------------------------------------------------------------------------------        Mylene Hutchinson is a 63 y.o. female and presents with Medication Evaluation (medication review after abx treatment with no imporvement, pt sent by endodontist dx with an abscess); Sinus Infection (with sore throat); and Eye Drainage (with swelling)         Subjective:  Pt comes today as per recommendation of her endodontist.  She was diagnosed with a dental abscess weeks ago, with a prolonged course of Clindamycin. She was given the course as 5 days at a time, not sure why a continuous prolonged course was not given. But in any case, pt kept having repeat issues with the LT eye looking infected. Along with this, has an on-going issue with post nasal drainage, feels like it is building up but the color of mucus coughed up is clear.   She sees an allergist, is getting allergy shots and was changed over to Flonase and Zyrtec in the hopes of better managing her allergies. Overall, she says that her symptoms are so much better. She basically is just having LT eye irritation, colored mucus, crusting and the lids feel a little swollen. Vision is clear. ROS:  Constitutional: No recent weight change. No weakness/fatigue. No f/c. Skin:LT eye looking infected No rashes, change in nails/hair, itching   HENT: No HA, dizziness. No hearing loss/tinnitus. No nasal congestion/discharge. Eyes: No change in vision, double/blurred vision or eye pain/redness. Cardiovascular: No CP/palpitations. No SIMON/orthopnea/PND. Respiratory: No cough/sputum, dyspnea, wheezing. Gastointestinal: No dysphagia, reflux. No n/v. No constipation/diarrhea. No melena/rectal bleeding. Genitourinary: No dysuria, urinary hesitancy, nocturia, hematuria. No incontinence. Musculoskeletal: No joint pain/stiffness. No muscle pain/tenderness. Endo: No heat/cold intolerance, no polyuria/polydypsia. Heme: No h/o anemia. No easy bleeding/bruising. Allergy/Immunology: No seasonal rhinitis. Denies frequent colds, sinus/ear infections. Neurological: No seizures/numbness/weakness. No paresthesias. Psychiatric:  No depression, anxiety. The problem list was updated as a part of today's visit.   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, T45.1X5A    Chest wall pain following surgery R07.89, G89.18    Renal insufficiency N28.9    Proteinuria R80.9    Chronic insomnia F51.04    Paroxysmal sleep G47.419    Encounter for long-term (current) use of high-risk medication Z79.899    Foraminal stenosis of lumbar region M99.83    Lumbar facet arthropathy (HCC) M46.96    Lumbar post-laminectomy syndrome M96.1    Lumbar neuritis M54.16    Spasm of back muscles M62.830    Drug-induced constipation K59.03    Bilateral sacroiliitis (HCC) M46.1    Drug-induced nausea and vomiting R11.2, T50.905A    Irritable bowel syndrome with both constipation and diarrhea K58.2    Fibromyalgia M79.7    Anxiety F41.9    Depression F32.9    Hypersomnolence G47.10    Chronic pain syndrome G89.4    Recurrent depression (HCC) F33.9       The PSH, FH were reviewed. SH:  Social History   Substance Use Topics    Smoking status: Never Smoker    Smokeless tobacco: Never Used    Alcohol use No       Medications/Allergies:  Current Outpatient Prescriptions on File Prior to Visit   Medication Sig Dispense Refill    cetirizine (ZYRTEC) 10 mg tablet Take 10 mg by mouth daily.  fentaNYL 37.5 mcg/hour pt72 1 Patch by TransDERmal route every fourty-eight (48) hours. Max Daily Amount: 1 Patch. for chronic, severe, refractory pain. Mylan, Sandoz, or Mallinckrodt brands preferred 15 Patch 0    traMADol (ULTRAM) 50 mg tablet Take 2 Tabs by mouth three (3) times daily as needed for Pain. Max Daily Amount: 300 mg. 180 Tab 2    ondansetron (ZOFRAN ODT) 8 mg disintegrating tablet Take 1 Tab by mouth every eight (8) hours as needed for Nausea. 30 Tab 2    levothyroxine (SYNTHROID) 100 mcg tablet TAKE 1 TABLET BY MOUTH DAILY (BEFORE BREAKFAST) 90 Tab 1    Omega-3-DHA-EPA-Fish Oil 1,000 mg (120 mg-180 mg) cap Take  by mouth.  naloxegol (MOVANTIK) 12.5 mg tab tablet Take 12.5 mg by mouth Daily (before breakfast).  fluticasone (FLONASE) 50 mcg/actuation nasal spray 2 Sprays by Both Nostrils route daily.  1 Bottle 0    methocarbamol (ROBAXIN) 750 mg tablet TAKE 1 TAB BY MOUTH THREE (3) TIMES DAILY. AS NEEDED FOR MUSCLE SPASMS 90 Tab 2    diclofenac (SOLARAZE) 3 % topical gel Apply 2-4 g to affected area two (2) times a day. As needed for joint pain. Apply to painful areas 100 g 5    acetaminophen (TYLENOL) 500 mg tablet Take  by mouth every six (6) hours as needed for Pain.  ranitidine (ZANTAC) 150 mg tablet Take 150 mg by mouth daily as needed.  budesonide (RHINOCORT AQUA) 32 mcg/actuation nasal spray       [START ON 5/18/2018] fentaNYL 37.5 mcg/hour pt72 1 Patch by TransDERmal route every fourty-eight (48) hours for 30 days. Max Daily Amount: 1 Patch. 15 Patch 0    lifitegrast (XIIDRA) 5 % dpet Apply  to eye.  olopatadine (PATANOL) 0.1 % ophthalmic solution Administer 2 Drops to both eyes two (2) times a day. 5 mL 1    traZODone (DESYREL) 50 mg tablet TAKE 2 TABS BY MOUTH NIGHTLY. AS NEEDED FOR INSOMNIA 60 Tab 3    lidocaine (XYLOCAINE) 5 % ointment Apply 1-2 g to affected area as needed for Pain. To painful areas 120 g 5    mometasone (NASONEX) 50 mcg/actuation nasal spray 2 Sprays daily as needed.  loratadine (CLARITIN) 10 mg tablet Take 10 mg by mouth daily as needed. No current facility-administered medications on file prior to visit.          Allergies   Allergen Reactions    Adhesive Rash    Aspirin Other (comments) and Nausea and Vomiting     G6PD  GI BLEED AND ULCER    Contrast Agent [Iodine] Rash     Allergic to CT Dye    Dilantin [Phenytoin Sodium Extended] Other (comments)     Difficulty waking    Iodinated Contrast- Oral And Iv Dye Rash     \"bumps on face\"    Lipitor [Atorvastatin] Other (comments)     PKU     Pcn [Penicillins] Rash and Hives     \"sores all over the body\"    Phenytoin Unknown (comments)     \"Trouble waking up\"    Sulfa (Sulfonamide Antibiotics) Rash and Nausea and Vomiting     G6PD  G6PD    Tegretol [Carbamazepine] Other (comments) and Vertigo \"Trouble waking up\"  Difficulty waking up         Health Maintenance:   Health Maintenance   Topic Date Due    Pneumococcal 19-64 Highest Risk (1 of 3 - PCV13) 12/23/1976    ZOSTER VACCINE AGE 60>  10/23/2017    MEDICARE YEARLY EXAM  03/28/2018    Influenza Age 5 to Adult  08/01/2018    PAP AKA CERVICAL CYTOLOGY  12/13/2019    BREAST CANCER SCRN MAMMOGRAM  02/28/2020    COLONOSCOPY  03/15/2023    DTaP/Tdap/Td series (2 - Td) 04/01/2025    Hepatitis C Screening  Completed       Objective:  Visit Vitals    /80 (BP 1 Location: Left arm, BP Patient Position: Sitting)    Pulse 81    Temp 98.8 °F (37.1 °C) (Oral)    Resp 8    Ht 5' 2\" (1.575 m)    Wt 158 lb 9.6 oz (71.9 kg)    LMP 08/28/2002    SpO2 98%    BMI 29.01 kg/m2          Nurses notes and VS reviewed. Physical Examination: General appearance - alert, well appearing, and in no distress  Eyes - right eye normal, conjunctivitis noted LT eye  Chest - clear to auscultation, no wheezes, rales or rhonchi, symmetric air entry  Heart - normal rate and regular rhythm        Labwork and Ancillary Studies:    CBC w/Diff  Lab Results   Component Value Date/Time    WBC 5.8 10/06/2016 04:02 PM    HGB 12.2 10/06/2016 04:02 PM    PLATELET 702 24/71/8822 04:02 PM         Basic Metabolic Profile  Lab Results   Component Value Date/Time    Sodium 139 10/06/2016 04:02 PM    Potassium 4.4 10/06/2016 04:02 PM    Chloride 98 10/06/2016 04:02 PM    CO2 27 10/06/2016 04:02 PM    Anion gap 8 11/24/2015 10:47 AM    Glucose 94 10/06/2016 04:02 PM    BUN 9 10/06/2016 04:02 PM    Creatinine 1.02 (H) 10/06/2016 04:02 PM    BUN/Creatinine ratio 9 10/06/2016 04:02 PM    GFR est AA 70 10/06/2016 04:02 PM    GFR est non-AA 61 10/06/2016 04:02 PM    Calcium 9.4 10/06/2016 04:02 PM         LFT  Lab Results   Component Value Date/Time    ALT (SGPT) 20 11/24/2015 10:47 AM    AST (SGOT) 15 11/24/2015 10:47 AM    Alk.  phosphatase 78 11/24/2015 10:47 AM    Bilirubin, direct 0.1 11/24/2015 10:47 AM    Bilirubin, total 0.4 11/24/2015 10:47 AM         Cholesterol  Lab Results   Component Value Date/Time    Cholesterol, total 278 (H) 11/24/2015 10:47 AM    HDL Cholesterol 47 11/24/2015 10:47 AM    LDL, calculated 209.8 (H) 11/24/2015 10:47 AM    Triglyceride 106 11/24/2015 10:47 AM    CHOL/HDL Ratio 5.9 (H) 11/24/2015 10:47 AM

## 2018-05-16 LAB
25(OH)D3+25(OH)D2 SERPL-MCNC: 20.8 NG/ML (ref 30–100)
ALBUMIN SERPL-MCNC: 4 G/DL (ref 3.6–4.8)
ALP SERPL-CCNC: 60 IU/L (ref 39–117)
ALT SERPL-CCNC: 10 IU/L (ref 0–32)
APPEARANCE UR: CLEAR
AST SERPL-CCNC: 18 IU/L (ref 0–40)
BASOPHILS # BLD AUTO: 0 X10E3/UL (ref 0–0.2)
BASOPHILS NFR BLD AUTO: 1 %
BILIRUB DIRECT SERPL-MCNC: 0.06 MG/DL (ref 0–0.4)
BILIRUB SERPL-MCNC: 0.2 MG/DL (ref 0–1.2)
BILIRUB UR QL STRIP: NEGATIVE
BUN SERPL-MCNC: 16 MG/DL (ref 8–27)
BUN/CREAT SERPL: 20 (ref 12–28)
CALCIUM SERPL-MCNC: 9 MG/DL (ref 8.7–10.3)
CHLORIDE SERPL-SCNC: 100 MMOL/L (ref 96–106)
CHOLEST SERPL-MCNC: 240 MG/DL (ref 100–199)
CO2 SERPL-SCNC: 22 MMOL/L (ref 18–29)
COLOR UR: YELLOW
CREAT SERPL-MCNC: 0.8 MG/DL (ref 0.57–1)
EOSINOPHIL # BLD AUTO: 0.1 X10E3/UL (ref 0–0.4)
EOSINOPHIL NFR BLD AUTO: 1 %
ERYTHROCYTE [DISTWIDTH] IN BLOOD BY AUTOMATED COUNT: 12.6 % (ref 12.3–15.4)
GFR SERPLBLD CREATININE-BSD FMLA CKD-EPI: 80 ML/MIN/1.73
GFR SERPLBLD CREATININE-BSD FMLA CKD-EPI: 93 ML/MIN/1.73
GLUCOSE SERPL-MCNC: 58 MG/DL (ref 65–99)
GLUCOSE UR QL: NEGATIVE
HCT VFR BLD AUTO: 36.2 % (ref 34–46.6)
HDLC SERPL-MCNC: 50 MG/DL
HGB BLD-MCNC: 11.1 G/DL (ref 11.1–15.9)
HGB UR QL STRIP: NEGATIVE
IMM GRANULOCYTES # BLD: 0 X10E3/UL (ref 0–0.1)
IMM GRANULOCYTES NFR BLD: 0 %
INTERPRETATION, 910389: NORMAL
KETONES UR QL STRIP: NEGATIVE
LDLC SERPL CALC-MCNC: 152 MG/DL (ref 0–99)
LEUKOCYTE ESTERASE UR QL STRIP: NEGATIVE
LYMPHOCYTES # BLD AUTO: 3.1 X10E3/UL (ref 0.7–3.1)
LYMPHOCYTES NFR BLD AUTO: 48 %
MCH RBC QN AUTO: 27.9 PG (ref 26.6–33)
MCHC RBC AUTO-ENTMCNC: 30.7 G/DL (ref 31.5–35.7)
MCV RBC AUTO: 91 FL (ref 79–97)
MICRO URNS: NORMAL
MONOCYTES # BLD AUTO: 0.5 X10E3/UL (ref 0.1–0.9)
MONOCYTES NFR BLD AUTO: 8 %
NEUTROPHILS # BLD AUTO: 2.8 X10E3/UL (ref 1.4–7)
NEUTROPHILS NFR BLD AUTO: 42 %
NITRITE UR QL STRIP: NEGATIVE
PH UR STRIP: 5 [PH] (ref 5–7.5)
PLATELET # BLD AUTO: 239 X10E3/UL (ref 150–379)
POTASSIUM SERPL-SCNC: 4.4 MMOL/L (ref 3.5–5.2)
PROT SERPL-MCNC: 7 G/DL (ref 6–8.5)
PROT UR QL STRIP: NEGATIVE
RBC # BLD AUTO: 3.98 X10E6/UL (ref 3.77–5.28)
SODIUM SERPL-SCNC: 144 MMOL/L (ref 134–144)
SP GR UR: 1.02 (ref 1–1.03)
T4 FREE SERPL-MCNC: 1.36 NG/DL (ref 0.82–1.77)
TRIGL SERPL-MCNC: 188 MG/DL (ref 0–149)
TSH SERPL DL<=0.005 MIU/L-ACNC: 2.44 UIU/ML (ref 0.45–4.5)
UROBILINOGEN UR STRIP-MCNC: 0.2 MG/DL (ref 0.2–1)
VLDLC SERPL CALC-MCNC: 38 MG/DL (ref 5–40)
WBC # BLD AUTO: 6.5 X10E3/UL (ref 3.4–10.8)

## 2018-05-30 NOTE — MR AVS SNAPSHOT
The patient is a 59y Male complaining of eye vision change. Visit Information Date & Time Provider Department Dept. Phone Encounter #  
 7/10/2017  2:40 PM Juliana Huggins [address] for Pain Management [phone] Follow-up Instructions Return in about 1 month (around 8/10/2017). Upcoming Health Maintenance Date Due Pneumococcal 19-64 Highest Risk (1 of 3 - PCV13) 12/23/1976 BREAST CANCER SCRN MAMMOGRAM 10/29/2015 INFLUENZA AGE 9 TO ADULT 8/1/2017 PAP AKA CERVICAL CYTOLOGY 12/13/2019 COLONOSCOPY 3/15/2023 DTaP/Tdap/Td series (2 - Td) 4/1/2025 Allergies as of 7/10/2017  Review Complete On: 7/10/2017 By: David Knowles LPN Severity Noted Reaction Type Reactions Adhesive    Rash Aspirin  06/20/2011    Other (comments), Nausea and Vomiting G6PD 
GI BLEED AND ULCER Contrast Agent [Iodine]    Rash Allergic to CT Dye  
 Dilantin [Phenytoin Sodium Extended]    Other (comments) Difficulty waking Iodinated Contrast- Oral And Iv Dye  05/03/2016    Rash \"bumps on face\" Lipitor [Atorvastatin]  12/13/2016    Other (comments) PKU Pcn [Penicillins]    Rash, Hives \"sores all over the body\" Phenytoin  05/03/2016    Unknown (comments) \"Trouble waking up\" Sulfa (Sulfonamide Antibiotics)    Rash, Nausea and Vomiting G6PD 
G6PD Tegretol [Carbamazepine]    Other (comments), Vertigo \"Trouble waking up\" Difficulty waking up Current Immunizations  Reviewed on 11/25/2015 Name Date Influenza Vaccine 11/25/2015  7:48 AM  
 Tdap 4/1/2015 Not reviewed this visit You Were Diagnosed With   
  
 Codes Comments Bilateral sacroiliitis ([facility] [address]    -  Primary ICD-10-CM: M46.1 ICD-9-CM: 720.2 Chemotherapy-induced neuropathy (HCC)     ICD-10-CM: G62.0, T45.1X5A 
ICD-9-CM: 357.6, E933.1 Lumbar neuritis     ICD-10-CM: M54.16 
ICD-9-CM: 724.4  Chronic midline low back pain with right-sided sciatica     ICD-10-CM: M54.41, G89.29 
 ICD-9-CM: 724.2, 724.3, 338.29 Postlaminectomy syndrome, lumbar region     ICD-10-CM: M96.1 ICD-9-CM: 722.83 Degeneration of lumbar or lumbosacral intervertebral disc     ICD-10-CM: M51.37 
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 
ICD-9-CM: 564.09, E980.5 Irritable bowel syndrome with both constipation and diarrhea     ICD-10-CM: K58.2 ICD-9-CM: 180.9 Fibromyalgia     ICD-10-CM: M79.7 ICD-9-CM: 729.1 Vitals BP Pulse Weight(growth percentile) LMP BMI OB Status 140/84 67 161 lb (73 kg) 08/28/2002 29.45 kg/m2 Postmenopausal  
 Smoking Status Never Smoker Vitals History BMI and BSA Data Body Mass Index Body Surface Area  
 29.45 kg/m 2 1.79 m 2 Preferred Pharmacy Pharmacy Name Phone [place] [id number] [phone] Your Updated Medication List  
  
   
This list is accurate as of: 7/10/17  4:41 PM.  Always use your most recent med list.  
  
  
  
  
 acetaminophen 500 mg tablet Commonly known as:  TYLENOL Take  by mouth every six (6) hours as needed for Pain. CLARITIN 10 mg tablet Generic drug:  loratadine Take 10 mg by mouth daily as needed. diclofenac 3 % topical gel Commonly known as:  Ovidio Kamara Apply 2-4 g to affected area two (2) times a day. As needed for joint pain. Apply to painful areas  
  
 doxycycline 100 mg capsule Commonly known as:  VIBRAMYCIN Take 1 capsule twice daily with food for 10 days, remain upright for 45 minutes after each dose. ergocalciferol 50,000 unit capsule Commonly known as:  ERGOCALCIFEROL  
TAKE 1 CAP BY MOUTH EVERY SEVEN DAYS. * fentaNYL 50 mcg/hr PATCH Commonly known as:  DURAGESIC  
1 Patch by TransDERmal route every fourty-eight (48) hours for 30 days. Max Daily Amount: 1 Patch. for chronic, severe, refractory pain. Mylan, Sandoz, or Mallinckrodt brands only  Indications: Chronic Pain with Opioid Tolerance, SEVERE PAIN WITH OPIOID TOLERANCE  
  
 * fentaNYL 37.5 mcg/hour Pt72  
1 Patch by TransDERmal route every fourty-eight (48) hours. Max Daily Amount: 1 Patch. fluticasone 50 mcg/actuation nasal spray Commonly known as:  Julisa Hoot Two sprays in each nostril daily. folic acid 1 mg tablet Commonly known as:  Google Take 1 mg by mouth two (2) times a day.  
  
 gabapentin 300 mg capsule Commonly known as:  NEURONTIN  
TAKE 1 CAP BY MOUTH THREE (3) TIMES DAILY. AS NEEDED FOR NERVE PAIN  
  
 levothyroxine 100 mcg tablet Commonly known as:  SYNTHROID  
TAKE 1 TABLET BY MOUTH DAILY (BEFORE BREAKFAST) lidocaine 5 % ointment Commonly known as:  XYLOCAINE Apply 1-2 g to affected area as needed for Pain. To painful areas  
  
 methocarbamol 750 mg tablet Commonly known as:  ROBAXIN  
TAKE 1 TAB BY MOUTH THREE (3) TIMES DAILY. AS NEEDED FOR MUSCLE SPASMS  
  
 naloxone 4 mg/actuation Spry 4 mg by Nasal route as needed for up to 2 doses. Indications: OPIOID TOXICITY  
  
 NASONEX 50 mcg/actuation nasal spray Generic drug:  mometasone 2 Sprays daily as needed. ondansetron 8 mg disintegrating tablet Commonly known as:  ZOFRAN ODT Take 1 Tab by mouth every eight (8) hours as needed for Nausea. ondansetron hcl 8 mg tablet Commonly known as:  Bridgett Wilksin Take 8 mg by mouth. OTHER Apply  to affected area three (3) times daily as needed. VA-CMOD1 compounded cream from [address] [place] [address]  
  
 senna-docusate 8.6-50 mg per tablet Commonly known as:  Dao Herbert Take 2 Tabs by mouth daily. For chronic constipation  
  
 traMADol 50 mg tablet Commonly known as:  ULTRAM  
Take 2 Tabs by mouth three (3) times daily as needed for Pain. Max Daily Amount: 300 mg. Transparent Dressings 3 1/2 X 4 \" Bndg Commonly known as:  TEGADERM  
1 Patch by Apply Externally route every fourty-eight (48) hours. traZODone 50 mg tablet Commonly known as:  Amanda Hail Take 2 Tabs by mouth nightly. As needed for insomnia ZANTAC 150 mg tablet Generic drug:  raNITIdine Take 150 mg by mouth daily as needed. * Notice: This list has 2 medication(s) that are the same as other medications prescribed for you. Read the directions carefully, and ask your doctor or other care provider to review them with you. Prescriptions Printed Refills  
 fentaNYL 37.5 mcg/hour pt72 0 Si Patch by TransDERmal route every fourty-eight (48) hours. Max Daily Amount: 1 Patch. Class: Print Route: TransDERmal  
 traMADol (ULTRAM) 50 mg tablet 3 Sig: Take 2 Tabs by mouth three (3) times daily as needed for Pain. Max Daily Amount: 300 mg. Class: Print Route: Oral  
  
We Performed the Following REFERRAL TO PHYSICAL THERAPY [YDM53 Custom] Comments:  
 Ms. Ratna Cid requests bilateral sacroiliac injections to address chronic sacroiliac dysfunction. Follow-up Instructions Return in about 1 month (around 8/10/2017). Referral Information Referral ID Referred By Referred To  
  
 [id number] Dariela Pineda MD   
   [address] [address] [address] [place] Lemasiria Toro Phone: [phone] Fax: [phone] Visits Status Start Date End Date 1 New Request 7/10/17 7/10/18 If your referral has a status of pending review or denied, additional information will be sent to support the outcome of this decision. Patient Instructions Plan: 
Continue same medications as prescribed for chronic pain--reduce fentanyl to 37.5 mcg/hr every two days Movantik 25 mg will be initiated once daily as needed for opioid induced constipation Follow up in 2 months or sooner if needed Regular exercise and attention to emotional health and diet remain the most effective ways to treat chronic pain of all kinds You may contact me with questions or concerns through [address] Introducing [facility] & HEALTH SERVICES! [employer] introduces Incujector patient portal. Now you can access parts of your medical record, email your doctor's office, and request medication refills online. 1. In your internet browser, go to [email or web address] 2. Click on the First Time User? Click Here link in the Sign In box. You will see the New Member Sign Up page. 3. Enter your Incujector Access Code exactly as it appears below. You will not need to use this code after youve completed the sign-up process. If you do not sign up before the expiration date, you must request a new code. · Incujector Access Code: [email or web address] Expires: 8/10/2017  1:54 PM 
 
4. Enter the last four digits of your Social Security Number (xxxx) and Date of Birth (mm/dd/yyyy) as indicated and click Submit. You will be taken to the next sign-up page. 5. Create a [place] ID. This will be your Incujector login ID and cannot be changed, so think of one that is secure and easy to remember. 6. Create a Incujector password. You can change your password at any time. 7. Enter your Password Reset Question and Answer. This can be used at a later time if you forget your password. 8. Enter your e-mail address.  You will receive e-mail notification when new information is available in RocketPlay. 9. Click Sign Up. You can now view and download portions of your medical record. 10. Click the Download Summary menu link to download a portable copy of your medical information. If you have questions, please visit the Frequently Asked Questions section of the RocketPlay website. Remember, RocketPlay is NOT to be used for urgent needs. For medical emergencies, dial 911. Now available from your iPhone and Android! Please provide this summary of care documentation to your next provider. Your primary care clinician is listed as Mahesh 51. If you have any questions after today's visit, please call [phone].

## 2018-06-05 ENCOUNTER — OFFICE VISIT (OUTPATIENT)
Dept: PAIN MANAGEMENT | Age: 63
End: 2018-06-05

## 2018-06-05 VITALS
BODY MASS INDEX: 27.6 KG/M2 | SYSTOLIC BLOOD PRESSURE: 155 MMHG | TEMPERATURE: 98.7 F | HEART RATE: 74 BPM | WEIGHT: 150 LBS | RESPIRATION RATE: 14 BRPM | HEIGHT: 62 IN | DIASTOLIC BLOOD PRESSURE: 90 MMHG

## 2018-06-05 DIAGNOSIS — G89.29 CHRONIC MIDLINE LOW BACK PAIN WITH RIGHT-SIDED SCIATICA: ICD-10-CM

## 2018-06-05 DIAGNOSIS — G89.4 CHRONIC PAIN SYNDROME: ICD-10-CM

## 2018-06-05 DIAGNOSIS — M51.37 DEGENERATION OF LUMBAR OR LUMBOSACRAL INTERVERTEBRAL DISC: ICD-10-CM

## 2018-06-05 DIAGNOSIS — M54.41 CHRONIC MIDLINE LOW BACK PAIN WITH RIGHT-SIDED SCIATICA: Primary | ICD-10-CM

## 2018-06-05 DIAGNOSIS — M54.41 CHRONIC MIDLINE LOW BACK PAIN WITH RIGHT-SIDED SCIATICA: ICD-10-CM

## 2018-06-05 DIAGNOSIS — M96.1 POSTLAMINECTOMY SYNDROME, LUMBAR REGION: ICD-10-CM

## 2018-06-05 DIAGNOSIS — Z79.899 ENCOUNTER FOR LONG-TERM (CURRENT) USE OF HIGH-RISK MEDICATION: Primary | ICD-10-CM

## 2018-06-05 DIAGNOSIS — M54.16 LUMBAR NEURITIS: ICD-10-CM

## 2018-06-05 DIAGNOSIS — G89.29 CHRONIC MIDLINE LOW BACK PAIN WITH RIGHT-SIDED SCIATICA: Primary | ICD-10-CM

## 2018-06-05 DIAGNOSIS — M47.816 LUMBAR SPONDYLOSIS: ICD-10-CM

## 2018-06-05 DIAGNOSIS — M96.1 LUMBAR POST-LAMINECTOMY SYNDROME: ICD-10-CM

## 2018-06-05 LAB
ALCOHOL UR POC: NORMAL
AMPHETAMINES UR POC: NEGATIVE
BARBITURATES UR POC: NORMAL
BENZODIAZEPINES UR POC: NEGATIVE
BUPRENORPHINE UR POC: NEGATIVE
CANNABINOIDS UR POC: NEGATIVE
CARISOPRODOL UR POC: NORMAL
COCAINE UR POC: NEGATIVE
FENTANYL UR POC: NORMAL
MDMA/ECSTASY UR POC: NORMAL
METHADONE UR POC: NEGATIVE
METHAMPHETAMINE UR POC: NORMAL
METHYLPHENIDATE UR POC: NORMAL
OPIATES UR POC: NEGATIVE
OXYCODONE UR POC: NEGATIVE
PHENCYCLIDINE UR POC: NORMAL
PROPOXYPHENE UR POC: NORMAL
TRAMADOL UR POC: NORMAL
TRICYCLICS UR POC: NORMAL

## 2018-06-05 RX ORDER — FENTANYL 25 UG/1
1 PATCH TRANSDERMAL
Qty: 10 PATCH | Refills: 0 | Status: SHIPPED | OUTPATIENT
Start: 2018-08-19 | End: 2018-09-18

## 2018-06-05 RX ORDER — TRAMADOL HYDROCHLORIDE 50 MG/1
50-100 TABLET ORAL
Qty: 150 TAB | Refills: 0 | Status: SHIPPED | OUTPATIENT
Start: 2018-06-05 | End: 2018-06-27 | Stop reason: SDUPTHER

## 2018-06-05 RX ORDER — TRAMADOL HYDROCHLORIDE 50 MG/1
50-100 TABLET ORAL
Qty: 150 TAB | Refills: 0 | Status: SHIPPED | OUTPATIENT
Start: 2018-06-05 | End: 2018-08-29 | Stop reason: SDUPTHER

## 2018-06-05 RX ORDER — FENTANYL 37.5 UG/H
1 PATCH, EXTENDED RELEASE TRANSDERMAL
Qty: 15 PATCH | Refills: 0 | Status: SHIPPED | OUTPATIENT
Start: 2018-06-21 | End: 2018-07-21

## 2018-06-05 RX ORDER — FENTANYL 37.5 UG/H
1 PATCH, EXTENDED RELEASE TRANSDERMAL
Qty: 10 PATCH | Refills: 0 | Status: SHIPPED | OUTPATIENT
Start: 2018-07-20 | End: 2018-10-19

## 2018-06-05 RX ORDER — ONDANSETRON 8 MG/1
8 TABLET, ORALLY DISINTEGRATING ORAL
Qty: 30 TAB | Refills: 2 | Status: SHIPPED | OUTPATIENT
Start: 2018-06-05 | End: 2018-06-05 | Stop reason: SDUPTHER

## 2018-06-05 RX ORDER — ONDANSETRON 8 MG/1
8 TABLET, ORALLY DISINTEGRATING ORAL
Qty: 30 TAB | Refills: 2 | Status: SHIPPED | OUTPATIENT
Start: 2018-06-05 | End: 2019-11-26 | Stop reason: SDUPTHER

## 2018-06-05 RX ORDER — METHOCARBAMOL 750 MG/1
TABLET, FILM COATED ORAL
Qty: 90 TAB | Refills: 2 | Status: SHIPPED | OUTPATIENT
Start: 2018-06-05 | End: 2018-06-18 | Stop reason: SDUPTHER

## 2018-06-05 NOTE — PATIENT INSTRUCTIONS
1. Continue current plan with no evidence of addiction or diversion. Stable on current medication without adverse events. 2. Refill methocarbamol 750 mg up to 3 times daily as needed. Has 4 refills left  3. Refill fentanyl 37.5 mcg patch every 48 hours ×1 month, then adjust down to 37.5 mcg patch every 72 hours ×1 month, then adjust down to 25 mcg patch every 72 hours until next visit. 4. Refill and adjust tramadol. Please take 1-2 tablets up to 3 times daily but no more than 5 per day. 5. Continue diclofenac gel 3% 3-4 times daily  6. Refill Zofran 8 mg up to 3 times daily as needed for nausea. 2 refills. 7. Recommend continuing physical therapy at home  8. Naloxone 4 mg nasal spray for opioid induced respiratory depression emergency only. 9. Discussed risks of addiction, dependency, and opioid induced hyperalgesia. 10. Please remember to call at least 4-5 business days prior to your medication refill. 11. Return to clinic in 3 months. Please call and cancel your appointment and pain management agreement if you do decide to transfer your care.

## 2018-06-05 NOTE — MR AVS SNAPSHOT
[address] [facility] [address] 
[phone] Patient: Jane Duran MRN: [id number] Visit Information Date & Time Provider Department Dept. Phone Encounter #  
 2018  1:20 PM Nathan Tovar, [address] for Pain Management [phone] Follow-up Instructions Return in about 3 months (around 2018). Your Appointments 2018 10:00 AM  
Follow Up with Darlene Norris NP [employer] ([facility]) Appt Note: fever [address] [facility] [address] [phone]  
  
   
 [address] [facility] [address] 2018 11:15 AM  
PHYSICAL with Nat Sauceda MD  
[employer] [address] [place] [address]) Appt Note: Annual Physical  
 [address] [facility] [address] [phone]  
  
   
 [address] [facility] Upcoming Health Maintenance Date Due Pneumococcal 19-64 Highest Risk (1 of 3 - PCV13) 1976 ZOSTER VACCINE AGE 60> 10/23/2017 MEDICARE YEARLY EXAM 3/28/2018 Influenza Age 5 to Adult 2018 PAP AKA CERVICAL CYTOLOGY 2019 BREAST CANCER SCRN MAMMOGRAM 2020 COLONOSCOPY 3/15/2023 DTaP/Tdap/Td series (2 - Td) 2025 Allergies as of 2018  Review Complete On: 2018 By: CORY Armijo Severity Noted Reaction Type Reactions Adhesive    Rash Aspirin  2011    Other (comments), Nausea and Vomiting G6PD 
GI BLEED AND ULCER Contrast Agent [Iodine]    Rash Allergic to CT Dye  
 Dilantin [Phenytoin Sodium Extended]    Other (comments) Difficulty waking Iodinated Contrast- Oral And Iv Dye  2016    Rash \"bumps on face\" Lipitor [Atorvastatin]  2016    Other (comments) PKU Pcn [Penicillins]    Rash, Hives \"sores all over the body\" Phenytoin  05/03/2016    Unknown (comments) \"Trouble waking up\" Sulfa (Sulfonamide Antibiotics)    Rash, Nausea and Vomiting G6PD 
G6PD Tegretol [Carbamazepine]    Other (comments), Vertigo \"Trouble waking up\" Difficulty waking up Current Immunizations  Reviewed on 2/23/2018 Name Date Influenza Vaccine 11/25/2015  7:48 AM  
 Influenza Vaccine (Quad) PF 2/23/2018  2:28 PM  
 Tdap 4/1/2015 Not reviewed this visit You Were Diagnosed With   
  
 Codes Comments Encounter for long-term (current) use of high-risk medication    -  Primary ICD-10-CM: F06.190 ICD-9-CM: V58.69 Chronic midline low back pain with right-sided sciatica     ICD-10-CM: M54.41, G89.29 ICD-9-CM: 724.2, 724.3, 338.29 Postlaminectomy syndrome, lumbar region     ICD-10-CM: M96.1 ICD-9-CM: 722.83 Degeneration of lumbar or lumbosacral intervertebral disc     ICD-10-CM: M51.37 
ICD-9-CM: 722.52 Lumbar post-laminectomy syndrome     ICD-10-CM: M96.1 ICD-9-CM: 722.83 Lumbar neuritis     ICD-10-CM: M54.16 
ICD-9-CM: 724.4 Chronic pain syndrome     ICD-10-CM: G89.4 ICD-9-CM: 338.4 Lumbar spondylosis     ICD-10-CM: M47.816 ICD-9-CM: 721.3 Vitals BP Pulse Temp Resp Height(growth percentile) Weight(growth percentile) 155/90 (BP 1 Location: Left arm, BP Patient Position: Sitting) 74 98.7 °F (37.1 °C) (Oral) 14 5' 2\" (1.575 m) 150 lb (68 kg) LMP BMI OB Status Smoking Status 08/28/2002 27.44 kg/m2 Postmenopausal Never Smoker Vitals History BMI and BSA Data Body Mass Index Body Surface Area  
 27.44 kg/m 2 1.72 m 2 Preferred Pharmacy Pharmacy Name Phone [address] [place] [address] [address] [phone] Your Updated Medication List  
  
   
This list is accurate as of 6/5/18  2:54 PM.  Always use your most recent med list.  
  
  
  
  
 acetaminophen 500 mg tablet Commonly known as:  TYLENOL Take  by mouth every six (6) hours as needed for Pain. cetirizine 10 mg tablet Commonly known as:  ZYRTEC Take 10 mg by mouth daily. CLARITIN 10 mg tablet Generic drug:  loratadine Take 10 mg by mouth daily as needed. diclofenac 3 % topical gel Commonly known as:  Kika Luzma Apply 2-4 g to affected area two (2) times a day. As needed for joint pain. Apply to painful areas * fentaNYL 37.5 mcg/hour Pt72  
1 Patch by TransDERmal route every fourty-eight (48) hours for 30 days. Max Daily Amount: 1 Patch. Start taking on:  6/21/2018 * fentaNYL 37.5 mcg/hour Pt72  
1 Patch by TransDERmal route every seventy-two (72) hours. Max Daily Amount: 1 Patch. for chronic, severe, refractory pain. Mylan, Sandoz, or Mallinckrodt brands preferred Start taking on:  7/20/2018 * fentaNYL 25 mcg/hr PATCH Commonly known as:  DURAGESIC  
1 Patch by TransDERmal route every seventy-two (72) hours for 30 days. Max Daily Amount: 1 Patch. Start taking on:  8/19/2018  
  
 fluticasone 50 mcg/actuation nasal spray Commonly known as:  Marcine Infield 2 Sprays by Both Nostrils route daily. levothyroxine 100 mcg tablet Commonly known as:  SYNTHROID  
TAKE 1 TABLET BY MOUTH DAILY (BEFORE BREAKFAST) lidocaine 5 % ointment Commonly known as:  XYLOCAINE Apply 1-2 g to affected area as needed for Pain. To painful areas  
  
 methocarbamol 750 mg tablet Commonly known as:  ROBAXIN  
TAKE 1 TAB BY MOUTH THREE (3) TIMES DAILY. AS NEEDED FOR MUSCLE SPASMS MOVANTIK 12.5 mg Tab tablet Generic drug:  naloxegol Take 12.5 mg by mouth Daily (before breakfast). NASONEX 50 mcg/actuation nasal spray Generic drug:  mometasone 2 Sprays daily as needed. omega 3-DHA-EPA-fish oil 1,000 mg (120 mg-180 mg) capsule Take  by mouth. ondansetron 8 mg disintegrating tablet Commonly known as:  ZOFRAN ODT  
 Take 1 Tab by mouth every eight (8) hours as needed for Nausea. RHINOCORT AQUA 32 mcg/actuation nasal spray Generic drug:  budesonide * traMADol 50 mg tablet Commonly known as:  ULTRAM  
Take 1-2 Tabs by mouth three (3) times daily as needed for Pain for up to 30 days. Max Daily Amount: 300 mg. No more than 5/day * traMADol 50 mg tablet Commonly known as:  ULTRAM  
Take 1-2 Tabs by mouth three (3) times daily as needed for Pain. Max Daily Amount: 300 mg. No more than 5/day * traMADol 50 mg tablet Commonly known as:  ULTRAM  
Take 1-2 Tabs by mouth three (3) times daily as needed for Pain. Max Daily Amount: 300 mg. No more than 5/day  
  
 traZODone 50 mg tablet Commonly known as:  DESYREL  
TAKE 2 TABS BY MOUTH NIGHTLY. AS NEEDED FOR INSOMNIA  
  
 XIIDRA 5 % Dpet Generic drug:  lifitegrast  
Apply  to eye. ZANTAC 150 mg tablet Generic drug:  raNITIdine Take 150 mg by mouth daily as needed. * Notice: This list has 6 medication(s) that are the same as other medications prescribed for you. Read the directions carefully, and ask your doctor or other care provider to review them with you. Prescriptions Printed Refills  
 fentaNYL 37.5 mcg/hour pt72 0 Starting on: 2018 Si Patch by TransDERmal route every fourty-eight (48) hours for 30 days. Max Daily Amount: 1 Patch. Class: Print Route: TransDERmal  
 traMADol (ULTRAM) 50 mg tablet 0 Sig: Take 1-2 Tabs by mouth three (3) times daily as needed for Pain for up to 30 days. Max Daily Amount: 300 mg. No more than 5/day Class: Print Route: Oral  
 fentaNYL 37.5 mcg/hour pt72 0 Starting on: 2018 Si Patch by TransDERmal route every seventy-two (72) hours. Max Daily Amount: 1 Patch. for chronic, severe, refractory pain. Mylan, Sandoz, or Mallinckrodt brands preferred Class: Print  Route: TransDERmal  
 traMADol (ULTRAM) 50 mg tablet 0  
 Sig: Take 1-2 Tabs by mouth three (3) times daily as needed for Pain. Max Daily Amount: 300 mg. No more than 5/day Class: Print Route: Oral  
 traMADol (ULTRAM) 50 mg tablet 0 Sig: Take 1-2 Tabs by mouth three (3) times daily as needed for Pain. Max Daily Amount: 300 mg. No more than 5/day Class: Print Route: Oral  
 fentaNYL (DURAGESIC) 25 mcg/hr PATCH 0 Starting on: 2018 Si Patch by TransDERmal route every seventy-two (72) hours for 30 days. Max Daily Amount: 1 Patch. Class: Print Route: TransDERmal  
  
Prescriptions Sent to Pharmacy Refills  
 ondansetron (ZOFRAN ODT) 8 mg disintegrating tablet 2 Sig: Take 1 Tab by mouth every eight (8) hours as needed for Nausea. Class: Normal  
 Pharmacy: [address] [place] [address], [id number] [employer] Ph #: [phone] Route: Oral  
 methocarbamol (ROBAXIN) 750 mg tablet 2 Sig: TAKE 1 TAB BY MOUTH THREE (3) TIMES DAILY. AS NEEDED FOR MUSCLE SPASMS Class: Normal  
 Pharmacy: [address] [place] [address], [address] [employer] Ph #: [phone] We Performed the Following AMB POC DRUG SCREEN () [ [facility]] DRUG SCREEN [[id number] Custom] REFERRAL TO PAIN MANAGEMENT [SGZ136 Custom] Comments:  
 Please repeat last bilateral lumbar facet joint medial branch RFA Follow-up Instructions Return in about 3 months (around 2018). Referral Information Referral ID Referred By Referred To  
  
 [id number] HERMILA Granados Not Available Visits Status Start Date End Date 1 New Request 18 If your referral has a status of pending review or denied, additional information will be sent to support the outcome of this decision. Patient Instructions 1. Continue current plan with no evidence of addiction or diversion. Stable on current medication without adverse events. 2. Refill methocarbamol 750 mg up to 3 times daily as needed. Has 4 refills left 3. Refill fentanyl 37.5 mcg patch every 48 hours ×1 month, then adjust down to 37.5 mcg patch every 72 hours ×1 month, then adjust down to 25 mcg patch every 72 hours until next visit. 4. Refill and adjust tramadol. Please take 12 tablets up to 3 times daily but no more than 5 per day. 5. Continue diclofenac gel 3% 34 times daily 6. Refill Zofran 8 mg up to 3 times daily as needed for nausea. 2 refills. 7. Recommend continuing physical therapy at home 8. Naloxone 4 mg nasal spray for opioid induced respiratory depression emergency only. 9. Discussed risks of addiction, dependency, and opioid induced hyperalgesia. 10. Please remember to call at least 4-5 business days prior to your medication refill. 11. Return to clinic in 3 months. Please call and cancel your appointment and pain management agreement if you do decide to transfer your care. Introducing [facility] SERVICES! [employer] introduces appweevr patient portal. Now you can access parts of your medical record, email your doctor's office, and request medication refills online. 1. In your internet browser, go to [email or web address] 2. Click on the First Time User? Click Here link in the Sign In box. You will see the New Member Sign Up page. 3. Enter your appweevr Access Code exactly as it appears below. You will not need to use this code after youve completed the sign-up process. If you do not sign up before the expiration date, you must request a new code. · appweevr Access Code: [email or web address] Expires: 8/13/2018 11:54 AM 
 
4. Enter the last four digits of your Social Security Number (xxxx) and Date of Birth (mm/dd/yyyy) as indicated and click Submit. You will be taken to the next sign-up page. 5. Create a appweevr ID.  This will be your appweevr login ID and cannot be changed, so think of one that is secure and easy to remember. 6. Create a Teralytics password. You can change your password at any time. 7. Enter your Password Reset Question and Answer. This can be used at a later time if you forget your password. 8. Enter your e-mail address. You will receive e-mail notification when new information is available in [address] 9. Click Sign Up. You can now view and download portions of your medical record. 10. Click the Download Summary menu link to download a portable copy of your medical information. If you have questions, please visit the Frequently Asked Questions section of the Teralytics website. Remember, Teralytics is NOT to be used for urgent needs. For medical emergencies, dial 911. Now available from your iPhone and Android! Please provide this summary of care documentation to your next provider. Your primary care clinician is listed as Mahesh 51. If you have any questions after today's visit, please call [phone].

## 2018-06-05 NOTE — PROGRESS NOTES
Nursing Notes    Patient presents to the office today in follow-up. Patient rates her pain at 8/10 on the numerical pain scale. Reviewed medications with counts as follows:    Rx Date filled Qty Dispensed Pill Count Last Dose Short   fentanyl 37.5 mcg 05/22/18 15 7+1 on yesterday no   Tramadol 50mg 05/22/18 180 134 today no                                  Comments:     POC UDS was performed in office today    Any new labs or imaging since last appointment? YES, xray mouth    Have you been to an emergency room (ER) or urgent care clinic since your last visit? NO            Have you been hospitalized since your last visit? NO     If yes, where, when, and reason for visit? Have you seen or consulted any other health care providers outside of the [address] [place] [address]  since your last visit? YES     If yes, where, when, and reason for visit? Ms. Jamey Fernandez has a reminder for a \"due or due soon\" health maintenance. I have asked that she contact her primary care provider for follow-up on this health maintenance.

## 2018-06-05 NOTE — PROGRESS NOTES
HISTORY OF PRESENT ILLNESS  Los Bishop is a 63 y.o. female    HPI: Ms. Ratna Cid  returns today for f/u of chronic low back pain due to lumbar postlaminectomy syndrome and polyarthralgias, related to an industrial accident in 12. H/o 3 lumbar surgeries, the last one was a lumbar fusion revision with Dr. Sanjeev Tony on 5/3/16. Ms. Ratna Cid is here today with her . We had a lengthy conversation about her current condition and medications in detail today. She has been doing well with her current treatment plan which continues to offer good pain control. We had a lengthy conversation about changes to our practice. I explained to her that moving forward we will be focusing on more conservative and non-opioid plan of care within our practice. I explained to her that we will begin tapering her medications. Please see tapering plan below. She has expressed her concern with these changes and states that she will most likely be transferring her care. I have asked her to call and cancel her appointment and pain management agreement she does decide to transfer care. She verbally agrees. She is now receiving Movantik from her GI specialist along with milk of magnesia which has been keeping her constipation under good control. We have been prescribing Zofran for nausea from our office. I have asked her to discuss Zofran with her GI specialist moving forward. She verbally agrees. She is otherwise doing well with no other complaints today. I will have her follow-up in 3 months for further evaluation and recommendation. Current medication management includes fentanyl 37.5 mg patch every 48 hours and tramadol 1-2 tablets 3 times daily as needed. No concurrent benzodiazepines. Medications are helping with pain control and quality of life. Her pain is 4-7/10 with medication and 10/10 without. Pt describes pain as constant aching, burning, and crushing (in toes) and stabbing.  Aggravating factors include most \"upright positions\" including sitting, standing, and walking. Relieved with rest, medication, and avoiding painful activities. Current treatment is helping to improve general activity, mood, walking, sleep, enjoyment of life    Ms. Samantha Borges is tolerating medications well, with no side effects noted. She is able to stay more active with less discomfort with these current doses. In the past 30 days, the patient reports an average of 50% pain relief with current treatment/medications. She is informed of side effects, risks, and benefits of this regimen, and emphasizes that she derives a significant improvement in functionality and quality of life, and notes that non-opioid medications and therapies in the past have not offered significant benefit. Pt does report constipation under better control with Movantik prescribed from GI special she is following up with her gastroenterologist who has recommended milk of magnesia. She will continue to follow-up with her gastroenterologist for further recommendation. She  is otherwise doing well with no other complaints today. She denies any adverse events including nausea, vomiting, dizziness, increased constipation, hallucinations, or seizures. Because the patient's current regimen places him/her at increased risk for possible overdose, a prescription for naloxone nasal spray has been provided.   The patient understands that this medication is only to be used in the setting of a possible overdose and that inadvertent use of this medication could precipitate overt withdrawal.    MME: 120  COMM: 8  OSWESTRY: 60 %  Last UDS reviewed         Allergies   Allergen Reactions    Adhesive Rash    Aspirin Other (comments) and Nausea and Vomiting     G6PD  GI BLEED AND ULCER    Contrast Agent [Iodine] Rash     Allergic to CT Dye    Dilantin [Phenytoin Sodium Extended] Other (comments)     Difficulty waking    Iodinated Contrast- Oral And Iv Dye Rash     \"bumps on face\"  Lipitor [Atorvastatin] Other (comments)     PKU     Pcn [Penicillins] Rash and Hives     \"sores all over the body\"    Phenytoin Unknown (comments)     \"Trouble waking up\"    Sulfa (Sulfonamide Antibiotics) Rash and Nausea and Vomiting     G6PD  G6PD    Tegretol [Carbamazepine] Other (comments) and Vertigo     \"Trouble waking up\"  Difficulty waking up       Past Surgical History:   Procedure Laterality Date    HX BREAST RECONSTRUCTION  2012    HX BREAST RECONSTRUCTION      HX CARPAL TUNNEL RELEASE      HX CYST INCISION AND DRAINAGE      tooth abscess    HX MASTECTOMY  2012    right side    HX MASTECTOMY Right     HX MOHS PROCEDURES      HX ORTHOPAEDIC      rotator cuff repair on left- Dr. Maxim Nazario HX [address]  05/2016    decompression, spinal fusion    HX OTHER SURGICAL      spinal chord stimulator inssertion/ did not stay in         Review of Systems   Constitutional: Positive for malaise/fatigue. Negative for chills, fever and weight loss. HENT: Positive for sore throat. Negative for congestion. Eyes: Negative for blurred vision and double vision. Respiratory: Positive for cough, shortness of breath and wheezing. Cardiovascular: Negative for chest pain and palpitations. Gastrointestinal: Positive for constipation ( treated by gastroenterologist ), nausea and vomiting ( occasionally). Negative for abdominal pain, diarrhea and heartburn. Genitourinary: Negative for dysuria, frequency and urgency. Musculoskeletal: Positive for back pain, joint pain and myalgias. Negative for neck pain. Neurological: Positive for headaches. Negative for dizziness, seizures and loss of consciousness. Endo/Heme/Allergies: Does not bruise/bleed easily. Psychiatric/Behavioral: Negative for depression. The patient has insomnia. The patient is not nervous/anxious. All other systems reviewed and are negative.       Physical Exam   Constitutional: She is oriented to person, place, and time and well-developed, well-nourished, and in no distress. No distress. HENT:   Head: Normocephalic and atraumatic. Eyes: EOM are normal.   Pulmonary/Chest: Effort normal.   Neurological: She is alert and oriented to person, place, and time. Gait ( using a walker) abnormal.   Skin: Skin is warm and dry. No rash noted. She is not diaphoretic. No erythema. Psychiatric: Mood, memory, affect and judgment normal.   Nursing note and vitals reviewed. ASSESSMENT:    1. Encounter for long-term (current) use of high-risk medication    2. Chronic midline low back pain with right-sided sciatica    3. Postlaminectomy syndrome, lumbar region    4. Degeneration of lumbar intervertebral disc    5. Lumbar post-laminectomy syndrome    6. Lumbar neuritis    7. Chronic pain syndrome    8. Lumbar spondylosis           [place] Prescription Monitoring Program was reviewed which does not demonstrate aberrancies and/or inconsistencies with regard to the historical prescribing of controlled medications to this patient by other providers. PLAN / Pt Instructions:  1. Continue current plan with no evidence of addiction or diversion. Stable on current medication without adverse events. 2. Refill methocarbamol 750 mg up to 3 times daily as needed. Has 4 refills left  3. Refill fentanyl 37.5 mcg patch every 48 hours ×1 month, then adjust down to 37.5 mcg patch every 72 hours ×1 month, then adjust down to 25 mcg patch every 72 hours until next visit. 4. Refill and adjust tramadol. Please take 1-2 tablets up to 3 times daily but no more than 5 per day. 5. Continue diclofenac gel 3% 3-4 times daily  6. Refill Zofran 8 mg up to 3 times daily as needed for nausea. 2 refills. 7. Recommend continuing physical therapy at home  8. Naloxone 4 mg nasal spray for opioid induced respiratory depression emergency only. 9. Discussed risks of addiction, dependency, and opioid induced hyperalgesia.    10. Please remember to call at least 4-5 business days prior to your medication refill. 11. Return to clinic in 3 months. Please call and cancel your appointment and pain management agreement if you do decide to transfer your care. Medications Ordered Today   Medications    fentaNYL 37.5 mcg/hour pt72     Si Patch by TransDERmal route every fourty-eight (48) hours for 30 days. Max Daily Amount: 1 Patch. Dispense:  15 Patch     Refill:  0    traMADol (ULTRAM) 50 mg tablet     Sig: Take 1-2 Tabs by mouth three (3) times daily as needed for Pain for up to 30 days. Max Daily Amount: 300 mg. No more than 5/day     Dispense:  150 Tab     Refill:  0    fentaNYL 37.5 mcg/hour pt72     Si Patch by TransDERmal route every seventy-two (72) hours. Max Daily Amount: 1 Patch. for chronic, severe, refractory pain. Mylan, Sandoz, or [employer] brands preferred     Dispense:  10 Patch     Refill:  0    traMADol (ULTRAM) 50 mg tablet     Sig: Take 1-2 Tabs by mouth three (3) times daily as needed for Pain. Max Daily Amount: 300 mg. No more than 5/day     Dispense:  150 Tab     Refill:  0    traMADol (ULTRAM) 50 mg tablet     Sig: Take 1-2 Tabs by mouth three (3) times daily as needed for Pain. Max Daily Amount: 300 mg. No more than 5/day     Dispense:  150 Tab     Refill:  0    fentaNYL (DURAGESIC) 25 mcg/hr PATCH     Si Patch by TransDERmal route every seventy-two (72) hours for 30 days. Max Daily Amount: 1 Patch. Dispense:  10 Patch     Refill:  0    DISCONTD: ondansetron (ZOFRAN ODT) 8 mg disintegrating tablet     Sig: Take 1 Tab by mouth every eight (8) hours as needed for Nausea. Dispense:  30 Tab     Refill:  2    methocarbamol (ROBAXIN) 750 mg tablet     Sig: TAKE 1 TAB BY MOUTH THREE (3) TIMES DAILY. AS NEEDED FOR MUSCLE SPASMS     Dispense:  90 Tab     Refill:  2    ondansetron (ZOFRAN ODT) 8 mg disintegrating tablet     Sig: Take 1 Tab by mouth every eight (8) hours as needed for Nausea.      Dispense:  30 Tab Refill:  2       DISPOSITION   Pain medications are prescribed with the objective of pain relief and improved physical and psychosocial function in this patient.  Counseled patient on proper use of prescribed medications.  Counseled patient about chronic medical conditions and their relationship to anxiety and depression and recommended mental health support as needed.  Reviewed with patient self-help tools, home exercise, and lifestyle changes to assist the patient in self-management of symptoms.  Reviewed with patient the treatment plan, goals of treatment plan, and limitations of treatment plan, to include the potential for side effects from medications and procedures. If side effects occur, it is the responsibility of the patient to inform the clinic so that a change in the treatment plan can be made in a safe manner. The patient is advised that stopping prescribed medication may cause an increase in symptoms and possible medication withdrawal symptoms. The patient is informed an emergency room evaluation may be necessary if this occurs. Spent 25 minutes with patient today which more than 50% of that time was spent on counseling and coordination of care. Juany Ray 6/5/2018        Note: Please excuse any typographical errors. Voice recognition software was used for this note and may cause mistakes.

## 2018-06-06 ENCOUNTER — OFFICE VISIT (OUTPATIENT)
Dept: FAMILY MEDICINE CLINIC | Age: 63
End: 2018-06-06

## 2018-06-06 VITALS
RESPIRATION RATE: 20 BRPM | OXYGEN SATURATION: 98 % | DIASTOLIC BLOOD PRESSURE: 86 MMHG | SYSTOLIC BLOOD PRESSURE: 140 MMHG | HEART RATE: 80 BPM | HEIGHT: 62 IN | BODY MASS INDEX: 28.6 KG/M2 | WEIGHT: 155.4 LBS | TEMPERATURE: 98.3 F

## 2018-06-06 DIAGNOSIS — J06.9 ACUTE URI: Primary | ICD-10-CM

## 2018-06-06 RX ORDER — FLUTICASONE PROPIONATE 50 MCG
2 SPRAY, SUSPENSION (ML) NASAL DAILY
Qty: 1 BOTTLE | Refills: 0 | Status: SHIPPED | OUTPATIENT
Start: 2018-06-06 | End: 2018-12-14 | Stop reason: SDUPTHER

## 2018-06-06 RX ORDER — DOXYCYCLINE 100 MG/1
100 CAPSULE ORAL 2 TIMES DAILY
Qty: 20 CAP | Refills: 0 | Status: SHIPPED | OUTPATIENT
Start: 2018-06-06 | End: 2018-06-16

## 2018-06-06 NOTE — PROGRESS NOTES
Elieser Anguiano is a 63 y.o. female (: 1955) presenting to address:    Chief Complaint   Patient presents with    Cold Symptoms       Vitals:    18 1006   BP: 140/86   Pulse: 80   Resp: 20   Temp: 98.3 °F (36.8 °C)   TempSrc: Oral   SpO2: 98%   Weight: 155 lb 6.4 oz (70.5 kg)   Height: 5' 2\" (1.575 m)   PainSc:   5   PainLoc: Throat   LMP: 2002       Hearing/Vision:   No exam data present    Learning Assessment:     Learning Assessment 10/9/2017   PRIMARY LEARNER Patient   HIGHEST LEVEL OF EDUCATION - PRIMARY LEARNER  -   BARRIERS PRIMARY LEARNER -   CO-LEARNER CAREGIVER -   PRIMARY LANGUAGE ENGLISH   LEARNER PREFERENCE PRIMARY READING     LISTENING     PICTURES     VIDEOS     DEMONSTRATION   ANSWERED BY self   RELATIONSHIP SELF     Depression Screening:     PHQ over the last two weeks 5/15/2018   Little interest or pleasure in doing things Not at all   Feeling down, depressed or hopeless Not at all   Total Score PHQ 2 0     Fall Risk Assessment:     Fall Risk Assessment, last 12 mths 2017   Able to walk? Yes   Fall in past 12 months? No     Abuse Screening:     Abuse Screening Questionnaire 5/15/2018   Do you ever feel afraid of your partner? N   Are you in a relationship with someone who physically or mentally threatens you? N   Is it safe for you to go home? Y     Coordination of Care Questionaire:   1. Have you been to the ER, urgent care clinic since your last visit? Hospitalized since your last visit? NO    2. Have you seen or consulted any other health care providers outside of the [address] [place] [address] since your last visit? Include any pap smears or colon screening. YES  Pain management    Advanced Directive:   1. Do you have an Advanced Directive? NO    2. Would you like information on Advanced Directives?  NO

## 2018-06-06 NOTE — PROGRESS NOTES
HISTORY OF PRESENT ILLNESS  Lisa Peres is a 63 y.o. female. HPI  C/o sore throat/facial congestion/pressure and pain for 2 weeks, not any better  Review of Systems   Constitutional: Negative for fever. HENT: Positive for congestion, sinus pain and sore throat. Negative for ear discharge and ear pain. Respiratory: Positive for cough (dry). Negative for sputum production and wheezing. Gastrointestinal: Negative for nausea. Skin: Negative for rash. Physical Exam   Constitutional: She appears well-developed and well-nourished. HENT:   Right Ear: Tympanic membrane normal.   Left Ear: Tympanic membrane normal.   Nose: Mucosal edema present. Right sinus exhibits maxillary sinus tenderness. Left sinus exhibits maxillary sinus tenderness. Mouth/Throat: No oropharyngeal exudate. Neck: Neck supple. Cardiovascular: Normal rate, regular rhythm and normal heart sounds. Pulmonary/Chest: Effort normal and breath sounds normal.   Vitals reviewed. ASSESSMENT and PLAN  Diagnoses and all orders for this visit:    1. Acute URI  -     fluticasone (FLONASE) 50 mcg/actuation nasal spray; 2 Sprays by Both Nostrils route daily. -     doxycycline (VIBRAMYCIN) 100 mg capsule; Take 1 Cap by mouth two (2) times a day for 10 days.     Follow up with Dr Cruz Oppenheim if no better

## 2018-06-14 ENCOUNTER — TELEPHONE (OUTPATIENT)
Dept: PAIN MANAGEMENT | Age: 63
End: 2018-06-14

## 2018-06-14 NOTE — TELEPHONE ENCOUNTER
Attempted to kamran patient concerning her referral for pain management. The patient needs to call her workmans comp and see who takes her workmans comp insur. She was not in and I was unable to leave a message.

## 2018-06-15 ENCOUNTER — TELEPHONE (OUTPATIENT)
Dept: PAIN MANAGEMENT | Age: 63
End: 2018-06-15

## 2018-06-15 NOTE — TELEPHONE ENCOUNTER
Received call from patient requesting that robaxin prescription be sent to [facility] on [address] in [facility].

## 2018-06-18 RX ORDER — METHOCARBAMOL 750 MG/1
TABLET, FILM COATED ORAL
Qty: 90 TAB | Refills: 2 | Status: SHIPPED | OUTPATIENT
Start: 2018-06-18 | End: 2018-08-29 | Stop reason: SDUPTHER

## 2018-06-27 ENCOUNTER — TELEPHONE (OUTPATIENT)
Dept: FAMILY MEDICINE CLINIC | Age: 63
End: 2018-06-27

## 2018-06-27 ENCOUNTER — OFFICE VISIT (OUTPATIENT)
Dept: FAMILY MEDICINE CLINIC | Age: 63
End: 2018-06-27

## 2018-06-27 VITALS
HEART RATE: 73 BPM | TEMPERATURE: 98.1 F | DIASTOLIC BLOOD PRESSURE: 80 MMHG | RESPIRATION RATE: 18 BRPM | OXYGEN SATURATION: 99 % | WEIGHT: 152 LBS | BODY MASS INDEX: 27.97 KG/M2 | HEIGHT: 62 IN | SYSTOLIC BLOOD PRESSURE: 148 MMHG

## 2018-06-27 DIAGNOSIS — H92.02 LEFT EAR PAIN: ICD-10-CM

## 2018-06-27 DIAGNOSIS — E78.00 PURE HYPERCHOLESTEROLEMIA: ICD-10-CM

## 2018-06-27 DIAGNOSIS — Z00.00 ANNUAL PHYSICAL EXAM: Primary | ICD-10-CM

## 2018-06-27 RX ORDER — CIPROFLOXACIN AND DEXAMETHASONE 3; 1 MG/ML; MG/ML
4 SUSPENSION/ DROPS AURICULAR (OTIC) 2 TIMES DAILY
Qty: 7.5 ML | Refills: 0 | Status: SHIPPED | OUTPATIENT
Start: 2018-06-27 | End: 2018-06-27

## 2018-06-27 RX ORDER — OFLOXACIN 3 MG/ML
5 SOLUTION AURICULAR (OTIC) DAILY
Qty: 5 ML | Refills: 0 | Status: SHIPPED | OUTPATIENT
Start: 2018-06-27 | End: 2018-10-19

## 2018-06-27 NOTE — MR AVS SNAPSHOT
[address]201 [facility] [address] 
[phone] Patient: Martinez Trejo MRN: [id number] :[id number] Visit Information Date & Time Provider Department Dept. Phone Encounter #  
 2018 11:15 AM Jose Luis Edwards, Coreen Valentin [place] [phone] [id number] Your Appointments 2018  1:20 PM  
Workers Comp F/U with CORY Da Silva [address] for Pain Management (WOLF SCHEDULING) Appt Note: 3 mon f/u per rc. ..to  
 30 [facility] [address]  
[phone] Fauzia Lopez [id number]  
  
    
 2018  1:40 PM  
Follow Up with CORY Da Silva [address] for Pain Management (WOLF SCHEDULING) Appt Note: Workers comp  appt. ..to  
 30 [facility] [address]  
[phone] Fauzia Lopez [phone] [id number] Upcoming Health Maintenance Date Due Pneumococcal 19-64 Highest Risk (1 of 3 - PCV13) 1976 ZOSTER VACCINE AGE 60> 10/23/2017 Influenza Age 5 to Adult 2018 PAP AKA CERVICAL CYTOLOGY 2019 BREAST CANCER SCRN MAMMOGRAM 2020 COLONOSCOPY 2021 DTaP/Tdap/Td series (2 - Td) 2025 Allergies as of 2018  Review Complete On: 2018 By: Jose Luis Edwards MD  
  
 Severity Noted Reaction Type Reactions Adhesive    Rash Aspirin  2011    Other (comments), Nausea and Vomiting G6PD 
GI BLEED AND ULCER Contrast Agent [Iodine]    Rash Allergic to CT Dye  
 Dilantin [Phenytoin Sodium Extended]    Other (comments) Difficulty waking Iodinated Contrast- Oral And Iv Dye  2016    Rash \"bumps on face\" Lipitor [Atorvastatin]  2016    Other (comments) PKU Pcn [Penicillins]    Rash, Hives \"sores all over the body\" Phenytoin  2016    Unknown (comments) \"Trouble waking up\" Sulfa (Sulfonamide Antibiotics)    Rash, Nausea and Vomiting G6PD 
G6PD Tegretol [Carbamazepine]    Other (comments), Vertigo \"Trouble waking up\" Difficulty waking up Current Immunizations  Reviewed on 2/23/2018 Name Date Influenza Vaccine 11/25/2015  7:48 AM  
 Influenza Vaccine (Quad) PF 2/23/2018  2:28 PM  
 Tdap 4/1/2015 Not reviewed this visit You Were Diagnosed With   
  
 Codes Comments Annual physical exam    -  Primary ICD-10-CM: Z00.00 ICD-9-CM: V70.0 Left ear pain     ICD-10-CM: H92.02 
ICD-9-CM: 388.70 Pure hypercholesterolemia     ICD-10-CM: E78.00 ICD-9-CM: 272.0 Vitals BP Pulse Temp Resp Height(growth percentile) Weight(growth percentile) 148/80 (BP 1 Location: Left arm, BP Patient Position: Sitting) 73 98.1 °F (36.7 °C) (Oral) 18 5' 2\" (1.575 m) 152 lb (68.9 kg) LMP SpO2 BMI OB Status Smoking Status 08/28/2002 99% 27.8 kg/m2 Postmenopausal Never Smoker Vitals History BMI and BSA Data Body Mass Index Body Surface Area  
 27.8 kg/m 2 1.74 m 2 [facility] [phone] Your Updated Medication List  
  
   
This list is accurate as of 6/27/18 12:13 PM.  Always use your most recent med list.  
  
  
  
  
 acetaminophen 500 mg tablet Commonly known as:  TYLENOL Take  by mouth every six (6) hours as needed for Pain. cetirizine 10 mg tablet Commonly known as:  ZYRTEC Take 10 mg by mouth daily. ciprofloxacin-dexamethasone 0.3-0.1 % otic suspension Commonly known as:  Garrick Carreon Administer 4 Drops in left ear two (2) times a day for 10 days. CLARITIN 10 mg tablet Generic drug:  loratadine Take 10 mg by mouth daily as needed. diclofenac 3 % topical gel Commonly known as:  Soule[place]aneh [place] Apply 2-4 g to affected area two (2) times a day. As needed for joint pain. Apply to painful areas * fentaNYL 37.5 mcg/hour Pt72  
1 Patch by TransDERmal route every fourty-eight (48) hours for 30 days. Max Daily Amount: 1 Patch. * fentaNYL 37.5 mcg/hour Pt72  
1 Patch by TransDERmal route every seventy-two (72) hours. Max Daily Amount: 1 Patch. for chronic, severe, refractory pain. Mylan, Sandoz, or [place] brands preferred Start taking on:  7/20/2018 * fentaNYL 25 mcg/hr PATCH Commonly known as:  DURAGESIC  
1 Patch by TransDERmal route every seventy-two (72) hours for 30 days. Max Daily Amount: 1 Patch. Start taking on:  8/19/2018  
  
 fluticasone 50 mcg/actuation nasal spray Commonly known as:  Stacy [place] 2 Sprays by Both Nostrils route daily. levothyroxine 100 mcg tablet Commonly known as:  SYNTHROID  
TAKE 1 TABLET BY MOUTH DAILY (BEFORE BREAKFAST) lidocaine 5 % ointment Commonly known as:  XYLOCAINE Apply 1-2 g to affected area as needed for Pain. To painful areas  
  
 methocarbamol 750 mg tablet Commonly known as:  ROBAXIN  
TAKE 1 TAB BY MOUTH THREE (3) TIMES DAILY. AS NEEDED FOR MUSCLE SPASMS MOVANTIK 12.5 mg Tab tablet Generic drug:  naloxegol Take 12.5 mg by mouth Daily (before breakfast). omega 3-DHA-EPA-fish oil 1,000 mg (120 mg-180 mg) capsule Take  by mouth. ondansetron 8 mg disintegrating tablet Commonly known as:  ZOFRAN ODT Take 1 Tab by mouth every eight (8) hours as needed for Nausea. traMADol 50 mg tablet Commonly known as:  ULTRAM  
Take 1-2 Tabs by mouth three (3) times daily as needed for Pain for up to 30 days. Max Daily Amount: 300 mg. No more than 5/day  
  
 traZODone 50 mg tablet Commonly known as:  DESYREL  
TAKE 2 TABS BY MOUTH NIGHTLY. AS NEEDED FOR INSOMNIA  
  
 XIIDRA 5 % Dpet Generic drug:  lifitegrast  
Apply  to eye. ZANTAC 150 mg tablet Generic drug:  raNITIdine Take 150 mg by mouth daily as needed. * Notice: This list has 3 medication(s) that are the same as other medications prescribed for you. Read the directions carefully, and ask your doctor or other care provider to review them with you. Prescriptions Sent to Pharmacy Refills  
 ciprofloxacin-dexamethasone (CIPRODEX) 0.3-0.1 % otic suspension 0 Sig: Administer 4 Drops in left ear two (2) times a day for 10 days. Class: Normal  
 Pharmacy: [address] [facility] [place], [address] [facility] #: [phone] Route: Left Ear We Performed the Following REFERRAL TO ENT-OTOLARYNGOLOGY [NKC24 Custom] Referral Information Referral ID Referred By Referred To  
  
 [id number] Huy MANCINI [facility] 2018 [facility] [address] [place] [address] Phone: [phone] Fax: [phone] Visits Status Start Date End Date 1 New Request 6/27/18 6/27/19 If your referral has a status of pending review or denied, additional information will be sent to support the outcome of this decision. Patient Instructions Well Visit, Women 48 to 72: Care Instructions Your Care Instructions Physical exams can help you stay healthy. Your doctor has checked your overall health and may have suggested ways to take good care of yourself. He or she also may have recommended tests. At home, you can help prevent illness with healthy eating, regular exercise, and other steps. Follow-up care is a key part of your treatment and safety. Be sure to make and go to all appointments, and call your doctor if you are having problems. It's also a good idea to know your test results and keep a list of the medicines you take. How can you care for yourself at home? · Reach and stay at a healthy weight.  This will lower your risk for many problems, such as obesity, diabetes, heart disease, and high blood pressure. · Get at least 30 minutes of exercise on most days of the week. Walking is a good choice. You also may want to do other activities, such as running, swimming, cycling, or playing tennis or team sports. · Do not smoke. Smoking can make health problems worse. If you need help quitting, talk to your doctor about stop-smoking programs and medicines. These can increase your chances of quitting for good. · Protect your skin from too much sun. When you're outdoors from 10 a.m. to 4 p.m., stay in the shade or cover up with clothing and a hat with a wide brim. Wear sunglasses that block UV rays. Even when it's cloudy, put broad-spectrum sunscreen (SPF 30 or higher) on any exposed skin. · See a dentist one or two times a year for checkups and to have your teeth cleaned. · Wear a seat belt in the car. · Limit alcohol to 1 drink a day. Too much alcohol can cause health problems. Follow your doctor's advice about when to have certain tests. These tests can spot problems early. · Cholesterol. Your doctor will tell you how often to have this done based on your age, family history, or other things that can increase your risk for heart attack and stroke. · Blood pressure. Have your blood pressure checked during a routine doctor visit. Your doctor will tell you how often to check your blood pressure based on your age, your blood pressure results, and other factors. · Mammogram. Ask your doctor how often you should have a mammogram, which is an X-ray of your breasts. A mammogram can spot breast cancer before it can be felt and when it is easiest to treat. · Pap test and pelvic exam. Ask your doctor how often you should have a Pap test. You may not need to have a Pap test as often as you used to. · Vision. Have your eyes checked every year or two or as often as your doctor suggests.  Some experts recommend that you have yearly exams for glaucoma and other age-related eye problems starting at age 48. · Hearing. Tell your doctor if you notice any change in your hearing. You can have tests to find out how well you hear. · Diabetes. Ask your doctor whether you should have tests for diabetes. · Colon cancer. You should begin tests for colon cancer at age 48. You may have one of several tests. Your doctor will tell you how often to have tests based on your age and risk. Risks include whether you already had a precancerous polyp removed from your colon or whether your parents, sisters and brothers, or children have had colon cancer. · Thyroid disease. Talk to your doctor about whether to have your thyroid checked as part of a regular physical exam. Women have an increased chance of a thyroid problem. · Osteoporosis. You should begin tests for bone density at age 72. If you are younger than 72, ask your doctor whether you have factors that may increase your risk for this disease. You may want to have this test before age 72. · Heart attack and stroke risk. At least every 4 to 6 years, you should have your risk for heart attack and stroke assessed. Your doctor uses factors such as your age, blood pressure, cholesterol, and whether you smoke or have diabetes to show what your risk for a heart attack or stroke is over the next 10 years. When should you call for help? Watch closely for changes in your health, and be sure to contact your doctor if you have any problems or symptoms that concern you. Where can you learn more? Go to http://celia-staci.info/. Enter C428 in the search box to learn more about \"Well Visit, Women 50 to 72: Care Instructions. \" Current as of: May 12, 2017 Content Version: 11.4 © 7697-4468 [employer]where[employer]. Care instructions adapted under license by [employer] (which disclaims liability or warranty for this information).  If you have questions about a medical condition or this instruction, always ask your healthcare professional. [place] [address] any warranty or liability for your use of this information. Introducing [address]! [facility] introduces [facility] patient portal. Now you can access parts of your medical record, email your doctor's office, and request medication refills online. 1. In your internet browser, go to [email or web address] 2. Click on the First Time User? Click Here link in the Sign In box. You will see the New Member Sign Up page. 3. Enter your [facility] Access Code exactly as it appears below. You will not need to use this code after youve completed the sign-up process. If you do not sign up before the expiration date, you must request a new code. · [facility] Access Code: [email or web address] Expires: 8/13/2018 11:54 AM 
 
4. Enter the last four digits of your Social Security Number (xxxx) and Date of Birth (mm/dd/yyyy) as indicated and click Submit. You will be taken to the next sign-up page. 5. Create a [facility] ID. This will be your [facility] login ID and cannot be changed, so think of one that is secure and easy to remember. 6. Create a [facility] password. You can change your password at any time. 7. Enter your Password Reset Question and Answer. This can be used at a later time if you forget your password. 8. Enter your e-mail address. You will receive e-mail notification when new information is available in [address] 9. Click Sign Up. You can now view and download portions of your medical record. 10. Click the Download Summary menu link to download a portable copy of your medical information. If you have questions, please visit the Frequently Asked Questions section of the [facility] website. Remember, [facility] is NOT to be used for urgent needs. For medical emergencies, dial 911. Now available from your iPhone and Android! Please provide this summary of care documentation to your next provider. Your primary care clinician is listed as Mahesh 51. If you have any questions after today's visit, please call [phone].

## 2018-06-27 NOTE — PATIENT INSTRUCTIONS
Well Visit, Women 48 to 72: Care Instructions  Your Care Instructions    Physical exams can help you stay healthy. Your doctor has checked your overall health and may have suggested ways to take good care of yourself. He or she also may have recommended tests. At home, you can help prevent illness with healthy eating, regular exercise, and other steps. Follow-up care is a key part of your treatment and safety. Be sure to make and go to all appointments, and call your doctor if you are having problems. It's also a good idea to know your test results and keep a list of the medicines you take. How can you care for yourself at home? · Reach and stay at a healthy weight. This will lower your risk for many problems, such as obesity, diabetes, heart disease, and high blood pressure. · Get at least 30 minutes of exercise on most days of the week. Walking is a good choice. You also may want to do other activities, such as running, swimming, cycling, or playing tennis or team sports. · Do not smoke. Smoking can make health problems worse. If you need help quitting, talk to your doctor about stop-smoking programs and medicines. These can increase your chances of quitting for good. · Protect your skin from too much sun. When you're outdoors from 10 a.m. to 4 p.m., stay in the shade or cover up with clothing and a hat with a wide brim. Wear sunglasses that block UV rays. Even when it's cloudy, put broad-spectrum sunscreen (SPF 30 or higher) on any exposed skin. · See a dentist one or two times a year for checkups and to have your teeth cleaned. · Wear a seat belt in the car. · Limit alcohol to 1 drink a day. Too much alcohol can cause health problems. Follow your doctor's advice about when to have certain tests. These tests can spot problems early. · Cholesterol.  Your doctor will tell you how often to have this done based on your age, family history, or other things that can increase your risk for heart attack and stroke. · Blood pressure. Have your blood pressure checked during a routine doctor visit. Your doctor will tell you how often to check your blood pressure based on your age, your blood pressure results, and other factors. · Mammogram. Ask your doctor how often you should have a mammogram, which is an X-ray of your breasts. A mammogram can spot breast cancer before it can be felt and when it is easiest to treat. · Pap test and pelvic exam. Ask your doctor how often you should have a Pap test. You may not need to have a Pap test as often as you used to. · Vision. Have your eyes checked every year or two or as often as your doctor suggests. Some experts recommend that you have yearly exams for glaucoma and other age-related eye problems starting at age 48. · Hearing. Tell your doctor if you notice any change in your hearing. You can have tests to find out how well you hear. · Diabetes. Ask your doctor whether you should have tests for diabetes. · Colon cancer. You should begin tests for colon cancer at age 48. You may have one of several tests. Your doctor will tell you how often to have tests based on your age and risk. Risks include whether you already had a precancerous polyp removed from your colon or whether your parents, sisters and brothers, or children have had colon cancer. · Thyroid disease. Talk to your doctor about whether to have your thyroid checked as part of a regular physical exam. Women have an increased chance of a thyroid problem. · Osteoporosis. You should begin tests for bone density at age 72. If you are younger than 72, ask your doctor whether you have factors that may increase your risk for this disease. You may want to have this test before age 72. · Heart attack and stroke risk. At least every 4 to 6 years, you should have your risk for heart attack and stroke assessed.  Your doctor uses factors such as your age, blood pressure, cholesterol, and whether you smoke or have diabetes to show what your risk for a heart attack or stroke is over the next 10 years. When should you call for help? Watch closely for changes in your health, and be sure to contact your doctor if you have any problems or symptoms that concern you. Where can you learn more? Go to http://celia-staci.info/. Enter Z828 in the search box to learn more about \"Well Visit, Women 50 to 72: Care Instructions. \"  Current as of: May 12, 2017  Content Version: 11.4  © 1440-1614 [employer]. Care instructions adapted under license by [employer] (which disclaims liability or warranty for this information). If you have questions about a medical condition or this instruction, always ask your healthcare professional. [address] any warranty or liability for your use of this information.

## 2018-06-27 NOTE — TELEPHONE ENCOUNTER
Patient called the ear drops that were sent in to the pharmacy are 220 dollars and can not afford this  can you send something else in for patient please

## 2018-06-27 NOTE — PROGRESS NOTES
SUBJECTIVE:   63 y.o. female for annual routine checkup. She is still dealing with issue related to pain in the LT side of her face. She saw us a few weeks ago and we treated her with Doxycycline. She felt better after completing the course but started feeling symptoms again after 3 days. Current Outpatient Prescriptions   Medication Sig Dispense Refill    ciprofloxacin-dexamethasone (CIPRODEX) 0.3-0.1 % otic suspension Administer 4 Drops in left ear two (2) times a day for 10 days. 7.5 mL 0    methocarbamol (ROBAXIN) 750 mg tablet TAKE 1 TAB BY MOUTH THREE (3) TIMES DAILY. AS NEEDED FOR MUSCLE SPASMS 90 Tab 2    fluticasone (FLONASE) 50 mcg/actuation nasal spray 2 Sprays by Both Nostrils route daily. 1 Bottle 0    traMADol (ULTRAM) 50 mg tablet Take 1-2 Tabs by mouth three (3) times daily as needed for Pain for up to 30 days. Max Daily Amount: 300 mg. No more than 5/day 150 Tab 0    [START ON 7/20/2018] fentaNYL 37.5 mcg/hour pt72 1 Patch by TransDERmal route every seventy-two (72) hours. Max Daily Amount: 1 Patch. for chronic, severe, refractory pain. Mylan, Sandoz, or [place] brands preferred 10 Patch 0    ondansetron (ZOFRAN ODT) 8 mg disintegrating tablet Take 1 Tab by mouth every eight (8) hours as needed for Nausea. 30 Tab 2    levothyroxine (SYNTHROID) 100 mcg tablet TAKE 1 TABLET BY MOUTH DAILY (BEFORE BREAKFAST) 90 Tab 1    Omega-3-DHA-EPA-Fish Oil 1,000 mg (120 mg-180 mg) cap Take  by mouth.  naloxegol (MOVANTIK) 12.5 mg tab tablet Take 12.5 mg by mouth Daily (before breakfast).  diclofenac (SOLARAZE) 3 % topical gel Apply 2-4 g to affected area two (2) times a day. As needed for joint pain. Apply to painful areas 100 g 5    acetaminophen (TYLENOL) 500 mg tablet Take  by mouth every six (6) hours as needed for Pain.  ranitidine (ZANTAC) 150 mg tablet Take 150 mg by mouth daily as needed.  loratadine (CLARITIN) 10 mg tablet Take 10 mg by mouth daily as needed.  fentaNYL 37.5 mcg/hour pt72 1 Patch by TransDERmal route every fourty-eight (48) hours for 30 days. Max Daily Amount: 1 Patch. 15 Patch 0    [START ON 8/19/2018] fentaNYL (DURAGESIC) 25 mcg/hr PATCH 1 Patch by TransDERmal route every seventy-two (72) hours for 30 days. Max Daily Amount: 1 Patch. 10 Patch 0    cetirizine (ZYRTEC) 10 mg tablet Take 10 mg by mouth daily.  lifitegrast (XIIDRA) 5 % dpet Apply  to eye.  traZODone (DESYREL) 50 mg tablet TAKE 2 TABS BY MOUTH NIGHTLY. AS NEEDED FOR INSOMNIA 60 Tab 3    lidocaine (XYLOCAINE) 5 % ointment Apply 1-2 g to affected area as needed for Pain. To painful areas 120 g 5       Past Medical History:   Diagnosis Date    Anemia     Asthma     on allergy shots, no inhalaers    Back injury     Back pain     Breast cancer ([facility] [address] 12/6/2011    Dr. Francine Escalante; Dr. Nick Amaral easily     Carpal tunnel syndrome, right     EMG done only in left however    Chronic pain     pelvic pain    Constipation     G6PD (glucose 6 phosphatase deficiency)     Gastroparesis     GERD (gastroesophageal reflux disease)     acid reflux    H/O colonoscopy 3/15/2013    Dr. Serafin Herrera Hepatitis A     History of abscessed tooth     Hypertension     no mediacations, doctor is monitoring    Hyperthyroidism     Grave's- radiation    IBS (irritable bowel syndrome)     Lymphedema 2012    in her right underarm    Numbness     legs    Osteopenia 3/19/2013    Other and unspecified hyperlipidemia     PUD (peptic ulcer disease)     pyloric ulcer    Unspecified hypothyroidism        Allergies: Adhesive; Aspirin; Contrast agent [iodine]; Dilantin [phenytoin sodium extended]; Iodinated contrast- oral and iv dye; Lipitor [atorvastatin]; Pcn [penicillins]; Phenytoin; Sulfa (sulfonamide antibiotics); and Tegretol [carbamazepine]   Patient's last menstrual period was 08/28/2002. ROS:  Feeling well. No dyspnea or chest pain on exertion.   No abdominal pain, change in bowel habits, black or bloody stools. No urinary tract symptoms. GYN ROS: no breast pain or new or enlarging lumps on self exam. No neurological complaints. OBJECTIVE:   The patient appears well, alert, oriented x 3, in no distress. Visit Vitals    /80 (BP 1 Location: Left arm, BP Patient Position: Sitting)    Pulse 73    Temp 98.1 °F (36.7 °C) (Oral)    Resp 18    Ht 5' 2\" (1.575 m)    Wt 152 lb (68.9 kg)    LMP 08/28/2002    SpO2 99%    BMI 27.8 kg/m2       General appearance: alert, cooperative, no distress, appears stated age  Head: Normocephalic, without obvious abnormality, atraumatic  Ears: normal TM's and external ear canals AU  Throat: Lips, mucosa, and tongue normal. Teeth and gums normal  Neck: supple, symmetrical, trachea midline, no adenopathy, thyroid: not enlarged, symmetric, no tenderness/mass/nodules, no carotid bruit and no JVD  Back: symmetric, no curvature. ROM normal. No CVA tenderness. Lungs: clear to auscultation bilaterally  Breasts: normal appearance, no masses or tenderness  Heart: regular rate and rhythm, S1, S2 normal, no murmur, click, rub or gallop  Abdomen: soft, non-tender. Bowel sounds normal. No masses,  no organomegaly  Extremities: extremities normal, atraumatic, no cyanosis or edema  Pulses: 2+ and symmetric  Skin: Skin color, texture, turgor normal. No rashes or lesions  Neurological is normal, no focal findings.            Lab Results   Component Value Date/Time    WBC 6.5 05/15/2018 12:00 AM    HGB 11.1 05/15/2018 12:00 AM    HCT 36.2 05/15/2018 12:00 AM    PLATELET 464 57/34/9185 12:00 AM    MCV 91 05/15/2018 12:00 AM         Lab Results   Component Value Date/Time    Sodium 144 05/15/2018 12:00 AM    Potassium 4.4 05/15/2018 12:00 AM    Chloride 100 05/15/2018 12:00 AM    CO2 22 05/15/2018 12:00 AM    Anion gap 8 11/24/2015 10:47 AM    Glucose 58 (L) 05/15/2018 12:00 AM    BUN 16 05/15/2018 12:00 AM    Creatinine 0.80 05/15/2018 12:00 AM    BUN/Creatinine ratio 20 05/15/2018 12:00 AM    GFR est AA 93 05/15/2018 12:00 AM    GFR est non-AA 80 05/15/2018 12:00 AM    Calcium 9.0 05/15/2018 12:00 AM         Lab Results   Component Value Date/Time    ALT (SGPT) 10 05/15/2018 12:00 AM    AST (SGOT) 18 05/15/2018 12:00 AM    Alk. phosphatase 60 05/15/2018 12:00 AM    Bilirubin, direct 0.06 05/15/2018 12:00 AM    Bilirubin, total 0.2 05/15/2018 12:00 AM         Lab Results   Component Value Date/Time    Cholesterol, total 240 (H) 05/15/2018 12:00 AM    HDL Cholesterol 50 05/15/2018 12:00 AM    LDL, calculated 152 (H) 05/15/2018 12:00 AM    VLDL, calculated 38 05/15/2018 12:00 AM    Triglyceride 188 (H) 05/15/2018 12:00 AM    CHOL/HDL Ratio 5.9 (H) 11/24/2015 10:47 AM         Lab Results   Component Value Date/Time    TSH 2.440 05/15/2018 12:00 AM    T4, Free 1.36 05/15/2018 12:00 AM         Lab Results   Component Value Date/Time    Vitamin D 25-Hydroxy 63.1 11/24/2015 10:47 AM    VITAMIN D, 25-HYDROXY 20.8 (L) 05/15/2018 12:00 AM             ASSESSMENT:   Diagnoses and all orders for this visit:    1. Annual physical exam    2. Left ear pain  -     REFERRAL TO ENT-OTOLARYNGOLOGY  -     ciprofloxacin-dexamethasone (CIPRODEX) 0.3-0.1 % otic suspension; Administer 4 Drops in left ear two (2) times a day for 10 days. 3. Pure hypercholesterolemia        PLAN:   mammogram  pap smear    She will definitely return to her dentist for a f/u to make sure it is still not dental.  Will send her to a new ENT for a 2nd opinion. Pt not feeling well today and this probably explains her mildly elevated BP. She will monitor it at home and notify me if it stays elevated. The plan was discussed with the patient. The patient verbalized understanding and is in agreement with the plan. All medication potential side effects were discussed with the patient.

## 2018-06-27 NOTE — PROGRESS NOTES
Anjel Lopez is a 63 y.o. female (: 1955) presenting to address:    Chief Complaint   Patient presents with    Annual Wellness Visit    Dizziness     fell on  due to this     Ear Pain     sharp comes and goes     Decreased Appetite     started a few days ago        Vitals:    18 1132   BP: 148/80   Pulse: 73   Resp: 18   Temp: 98.1 °F (36.7 °C)   TempSrc: Oral   SpO2: 99%   Weight: 152 lb (68.9 kg)   Height: 5' 2\" (1.575 m)   PainSc:   8   PainLoc: Back   LMP: 2002       Hearing/Vision:   No exam data present    Learning Assessment:     Learning Assessment 10/9/2017   PRIMARY LEARNER Patient   HIGHEST LEVEL OF EDUCATION - PRIMARY LEARNER  -   BARRIERS PRIMARY LEARNER -   CO-LEARNER CAREGIVER -   PRIMARY LANGUAGE ENGLISH   LEARNER PREFERENCE PRIMARY READING     LISTENING     PICTURES     VIDEOS     DEMONSTRATION   ANSWERED BY self   RELATIONSHIP SELF     Depression Screening:     PHQ over the last two weeks 5/15/2018   Little interest or pleasure in doing things Not at all   Feeling down, depressed or hopeless Not at all   Total Score PHQ 2 0     Fall Risk Assessment:     Fall Risk Assessment, last 12 mths 2018   Able to walk? Yes   Fall in past 12 months? Yes   Fall with injury? No   Number of falls in past 12 months 1   Fall Risk Score 1     Abuse Screening:     Abuse Screening Questionnaire 5/15/2018   Do you ever feel afraid of your partner? N   Are you in a relationship with someone who physically or mentally threatens you? N   Is it safe for you to go home? Y     Coordination of Care Questionaire:   1. Have you been to the ER, urgent care clinic since your last visit? Hospitalized since your last visit? NO    2. Have you seen or consulted any other health care providers outside of the [facility] since your last visit? Include any pap smears or colon screening. Gastro for colonoscopy     Advanced Directive:   1. Do you have an Advanced Directive? Yes     2. Would you like information on Advanced Directives?  NO

## 2018-08-29 ENCOUNTER — OFFICE VISIT (OUTPATIENT)
Dept: PAIN MANAGEMENT | Age: 63
End: 2018-08-29

## 2018-08-29 VITALS
WEIGHT: 147 LBS | RESPIRATION RATE: 16 BRPM | SYSTOLIC BLOOD PRESSURE: 155 MMHG | HEART RATE: 78 BPM | HEIGHT: 62 IN | BODY MASS INDEX: 27.05 KG/M2 | DIASTOLIC BLOOD PRESSURE: 95 MMHG | TEMPERATURE: 99.4 F

## 2018-08-29 DIAGNOSIS — M54.41 CHRONIC MIDLINE LOW BACK PAIN WITH RIGHT-SIDED SCIATICA: ICD-10-CM

## 2018-08-29 DIAGNOSIS — M51.37 DEGENERATION OF LUMBAR OR LUMBOSACRAL INTERVERTEBRAL DISC: ICD-10-CM

## 2018-08-29 DIAGNOSIS — M96.1 LUMBAR POST-LAMINECTOMY SYNDROME: ICD-10-CM

## 2018-08-29 DIAGNOSIS — G89.4 CHRONIC PAIN SYNDROME: ICD-10-CM

## 2018-08-29 DIAGNOSIS — M96.1 POSTLAMINECTOMY SYNDROME, LUMBAR REGION: ICD-10-CM

## 2018-08-29 DIAGNOSIS — G89.29 CHRONIC MIDLINE LOW BACK PAIN WITH RIGHT-SIDED SCIATICA: ICD-10-CM

## 2018-08-29 RX ORDER — METHOCARBAMOL 750 MG/1
TABLET, FILM COATED ORAL
Qty: 90 TAB | Refills: 2 | Status: SHIPPED | OUTPATIENT
Start: 2018-08-29 | End: 2018-08-29 | Stop reason: CLARIF

## 2018-08-29 RX ORDER — METHOCARBAMOL 750 MG/1
TABLET, FILM COATED ORAL
Qty: 90 TAB | Refills: 2 | Status: SHIPPED | OUTPATIENT
Start: 2018-08-29 | End: 2019-06-14 | Stop reason: SDUPTHER

## 2018-08-29 RX ORDER — ONDANSETRON 8 MG/1
8 TABLET, ORALLY DISINTEGRATING ORAL
Qty: 30 TAB | Refills: 2 | Status: SHIPPED | OUTPATIENT
Start: 2018-08-29 | End: 2018-10-19 | Stop reason: SDUPTHER

## 2018-08-29 RX ORDER — TRAMADOL HYDROCHLORIDE 50 MG/1
50-100 TABLET ORAL
Qty: 180 TAB | Refills: 0 | Status: SHIPPED | OUTPATIENT
Start: 2018-10-02 | End: 2018-11-01

## 2018-08-29 RX ORDER — TRAMADOL HYDROCHLORIDE 50 MG/1
50-100 TABLET ORAL
Qty: 180 TAB | Refills: 0 | Status: SHIPPED | OUTPATIENT
Start: 2018-11-01 | End: 2018-10-19 | Stop reason: SDUPTHER

## 2018-08-29 RX ORDER — TRAMADOL HYDROCHLORIDE 50 MG/1
50-100 TABLET ORAL
Qty: 180 TAB | Refills: 0 | Status: SHIPPED | OUTPATIENT
Start: 2018-09-03 | End: 2018-10-03

## 2018-08-29 RX ORDER — CHOLECALCIFEROL (VITAMIN D3) 125 MCG
2000 CAPSULE ORAL DAILY
COMMUNITY

## 2018-08-29 RX ORDER — DICLOFENAC SODIUM 30 MG/G
2-4 GEL TOPICAL 2 TIMES DAILY
Qty: 100 G | Refills: 5 | Status: SHIPPED | OUTPATIENT
Start: 2018-08-29 | End: 2018-08-29 | Stop reason: CLARIF

## 2018-08-29 RX ORDER — DICLOFENAC SODIUM 30 MG/G
2-4 GEL TOPICAL 2 TIMES DAILY
Qty: 100 G | Refills: 5 | Status: SHIPPED | OUTPATIENT
Start: 2018-08-29 | End: 2019-07-09 | Stop reason: SDUPTHER

## 2018-08-29 RX ORDER — ONDANSETRON 8 MG/1
8 TABLET, ORALLY DISINTEGRATING ORAL
Qty: 30 TAB | Refills: 2 | Status: SHIPPED | OUTPATIENT
Start: 2018-08-29 | End: 2018-08-29 | Stop reason: CLARIF

## 2018-08-29 RX ORDER — FENTANYL 12.5 UG/1
1 PATCH TRANSDERMAL
Qty: 10 PATCH | Refills: 0 | Status: SHIPPED | OUTPATIENT
Start: 2018-09-18 | End: 2018-10-18

## 2018-08-29 NOTE — PROGRESS NOTES
error  HISTORY OF PRESENT ILLNESS  Jatin Raza is a 63 y.o. female    HPI: Ms. Beni Cronin  returns today for f/u of chronic low back pain due to lumbar postlaminectomy syndrome and polyarthralgias, related to an industrial accident in 12. H/o 3 lumbar surgeries, the last one was a lumbar fusion revision with Dr. Parker Haines on 5/3/16. Lumbar epidural injections in the past with some improvement. Recent RFA which flared her pain. Aqua therapy with good improvement. SCS many years ago with no improvement. Ms. Beni Cronin is here today with her  and . We have very lengthy conversation today regarding current condition and medications. We had a very lengthy conversation last visit regarding changes to our practice which we also discussed today. We began tapering her fentanyl patch. She reports increased pain since reducing her fentanyl patch. She has tried many non-opioid treatments in the past with little to no improvement. She reports that she recently underwent right lumbar RFA by Dr. Alina Nick on 8/8/2018 which \"flared\" her pain. She was prescribed a prednisone taper on 8/23 with minimal improvement so far. I have asked her to follow with Dr. Alina Nick for further evaluation and recommendation. She notes that her last RFA caused a similar \"flare. \"  I did explain to her that we will have to continue tapering her fentanyl as previously discussed today. We had a very lengthy conversation today regarding changes to her current treatment plan. Please see tapering plan below. I have agreed to continue with her tramadol up to 6 per day as needed during this taper plan but she understands it we will begin tapering her tramadol next visit as well. She is quite concerned about these changes and states that she will most likely need to transfer her care but this has to be approved through [facility].   I have recommended some alternative treatments such as continuing aqua therapy which has been helpful in the past.  We discussed adding home exercise/stretching which was demonstrated in the office today. I have also recommended an H wave trial.  She is in agreement with this plan. I will have her follow-up in 3 months for further evaluation or sooner if needed. Current medication management includes fentanyl 37.5 mg patch every 72 hours and tramadol 1-2 tablets 3 times daily as needed. No concurrent benzodiazepines. Medications are helping with pain control and quality of life. Her pain is 7-8/10 with medication and 10/10 without. Pt describes pain as constant aching, burning, and crushing (in toes) and stabbing. Aggravating factors include most \"upright positions\" including sitting, standing, and walking. Relieved with rest, medication, and avoiding painful activities. Current treatment is helping to improve general activity, mood, walking, sleep, enjoyment of life    Ms. Moisés Orellana is tolerating medications well, with no side effects noted. She is able to stay more active with less discomfort with these current doses. In the past 30 days, the patient reports an average of 20-30% pain relief with current treatment/medications. She is informed of side effects, risks, and benefits of this regimen, and emphasizes that she derives a significant improvement in functionality and quality of life, and notes that non-opioid medications and therapies in the past have not offered significant benefit. Pt does report constipation under better control with Movantik prescribed from [facility] she is following up with her gastroenterologist who has recommended milk of magnesia. She will continue to follow-up with her gastroenterologist for further recommendation. She  is otherwise doing well with no other complaints today. She denies any adverse events including nausea, vomiting, dizziness, increased constipation, hallucinations, or seizures.      Because the patient's current regimen places him/her at increased risk for possible overdose, a prescription for naloxone nasal spray has been provided. The patient understands that this medication is only to be used in the setting of a possible overdose and that inadvertent use of this medication could precipitate overt withdrawal.    MME: 80  COMM: 8  OSWESTRY: 60 %  Last UDS reviewed         Allergies   Allergen Reactions    Adhesive Rash    Aspirin Other (comments) and Nausea and Vomiting     G6PD  GI BLEED AND ULCER    Contrast Agent [Iodine] Rash     Allergic to CT Dye    Dilantin [Phenytoin Sodium Extended] Other (comments)     Difficulty waking    Iodinated Contrast- Oral And Iv Dye Rash     \"bumps on face\"    Lipitor [Atorvastatin] Other (comments)     PKU     Pcn [Penicillins] Rash and Hives     \"sores all over the body\"    Phenytoin Unknown (comments)     \"Trouble waking up\"    Sulfa (Sulfonamide Antibiotics) Rash and Nausea and Vomiting     G6PD  G6PD    Tegretol [Carbamazepine] Other (comments) and Vertigo     \"Trouble waking up\"  Difficulty waking up       Past Surgical History:   Procedure Laterality Date    HX BREAST RECONSTRUCTION  2012    HX BREAST RECONSTRUCTION      HX CARPAL TUNNEL RELEASE      HX CYST INCISION AND DRAINAGE      tooth abscess    HX MASTECTOMY  2012    right side    HX MASTECTOMY Right     HX MOHS PROCEDURES      HX ORTHOPAEDIC      rotator cuff repair on left- Dr. Anne Marie Nguyen HX ORTHOPAEDIC  05/2016    decompression, spinal fusion    HX OTHER SURGICAL      spinal chord stimulator inssertion/ did not stay in         Review of Systems   Constitutional: Positive for malaise/fatigue. Negative for chills, fever and weight loss. HENT: Positive for sore throat. Negative for congestion. Eyes: Negative for blurred vision and double vision. Respiratory: Positive for cough, shortness of breath and wheezing. Cardiovascular: Negative for chest pain and palpitations.    Gastrointestinal: Positive for constipation ( treated by gastroenterologist ), nausea and vomiting ( occasionally). Negative for abdominal pain, diarrhea and heartburn. Genitourinary: Negative for dysuria, frequency and urgency. Musculoskeletal: Positive for back pain, joint pain and myalgias. Negative for neck pain. Neurological: Positive for headaches. Negative for dizziness, seizures and loss of consciousness. Endo/Heme/Allergies: Does not bruise/bleed easily. Psychiatric/Behavioral: Negative for depression. The patient has insomnia. The patient is not nervous/anxious. All other systems reviewed and are negative. Physical Exam   Constitutional: She is oriented to person, place, and time and well-developed, well-nourished, and in no distress. No distress. HENT:   Head: Normocephalic and atraumatic. Eyes: EOM are normal.   Pulmonary/Chest: Effort normal.   Musculoskeletal:        Lumbar back: She exhibits tenderness and pain. Positive SLR bilaterally. Tenderness in SI joint bilaterally. Tenderness to palpation along lumbar paraspinal aspect. Neurological: She is alert and oriented to person, place, and time. Gait ( using a walker) abnormal.   Skin: Skin is warm and dry. No rash noted. She is not diaphoretic. No erythema. Psychiatric: Mood, memory, affect and judgment normal.   Nursing note and vitals reviewed. ASSESSMENT:    1. Chronic midline low back pain with right-sided sciatica    2. Postlaminectomy syndrome, lumbar region    3. Degeneration of lumbar intervertebral disc    4. Lumbar post-laminectomy syndrome    5. Chronic pain syndrome           [place] Prescription Monitoring Program was reviewed which does not demonstrate aberrancies and/or inconsistencies with regard to the historical prescribing of controlled medications to this patient by other providers. PLAN / Pt Instructions:  1. Modify current plan with no evidence of addiction or diversion. Stable on current medication without adverse events.     2. Refill methocarbamol 750 mg up to 3 times daily as needed. Has 4 refills left  3. Refill and adjust fentanyl 25 mg patch down to 12 mcg patch every 72 hours x 1 month, then discontinue. 4. Refill tramadol. Please take 1-2 tablets up to 3 times daily   5. Refill diclofenac gel 3% 3-4 times daily  6. Add H wave. Use as directed. 30-day trial  7. At home exercise program.  Exercise/stretching were demonstrated in the office today. 8. Refill Zofran 8 mg up to 3 times daily as needed for nausea. 2 refills. 9. Naloxone 4 mg nasal spray for opioid induced respiratory depression emergency only. 10. Discussed risks of addiction, dependency, and opioid induced hyperalgesia. 11. Please remember to call at least 5 business days prior to your medication refill. 12. Return to clinic in 3 months. Please call and cancel your appointment and pain management agreement if you do decide to transfer your care. Medications Ordered Today   Medications    traMADol (ULTRAM) 50 mg tablet     Sig: Take 1-2 Tabs by mouth three (3) times daily as needed for Pain for up to 30 days. Max Daily Amount: 300 mg. Dispense:  180 Tab     Refill:  0    fentaNYL (DURAGESIC) 12 mcg/hr patch     Si Patch by TransDERmal route every seventy-two (72) hours for 30 days. Max Daily Amount: 1 Patch. Dispense:  10 Patch     Refill:  0    traMADol (ULTRAM) 50 mg tablet     Sig: Take 1-2 Tabs by mouth three (3) times daily as needed for Pain for up to 30 days. Max Daily Amount: 300 mg. Dispense:  180 Tab     Refill:  0    traMADol (ULTRAM) 50 mg tablet     Sig: Take 1-2 Tabs by mouth three (3) times daily as needed for Pain for up to 30 days. Max Daily Amount: 300 mg. Dispense:  180 Tab     Refill:  0    DISCONTD: diclofenac (SOLARAZE) 3 % topical gel     Sig: Apply 2-4 g to affected area two (2) times a day for 30 days. As needed for joint pain.  Apply to painful areas     Dispense:  100 g     Refill:  5    DISCONTD: ondansetron (ZOFRAN ODT) 8 mg disintegrating tablet     Sig: Take 1 Tab by mouth every eight (8) hours as needed for Nausea. Dispense:  30 Tab     Refill:  2    DISCONTD: methocarbamol (ROBAXIN) 750 mg tablet     Sig: TAKE 1 TAB BY MOUTH THREE (3) TIMES DAILY. AS NEEDED FOR MUSCLE SPASMS     Dispense:  90 Tab     Refill:  2    diclofenac (SOLARAZE) 3 % topical gel     Sig: Apply 2-4 g to affected area two (2) times a day for 30 days. As needed for joint pain. Apply to painful areas     Dispense:  100 g     Refill:  5    ondansetron (ZOFRAN ODT) 8 mg disintegrating tablet     Sig: Take 1 Tab by mouth every eight (8) hours as needed for Nausea. Dispense:  30 Tab     Refill:  2    methocarbamol (ROBAXIN) 750 mg tablet     Sig: TAKE 1 TAB BY MOUTH THREE (3) TIMES DAILY. AS NEEDED FOR MUSCLE SPASMS     Dispense:  90 Tab     Refill:  2       DISPOSITION   Pain medications are prescribed with the objective of pain relief and improved physical and psychosocial function in this patient.  Counseled patient on proper use of prescribed medications.  Counseled patient about chronic medical conditions and their relationship to anxiety and depression and recommended mental health support as needed.  Reviewed with patient self-help tools, home exercise, and lifestyle changes to assist the patient in self-management of symptoms.  Reviewed with patient the treatment plan, goals of treatment plan, and limitations of treatment plan, to include the potential for side effects from medications and procedures. If side effects occur, it is the responsibility of the patient to inform the clinic so that a change in the treatment plan can be made in a safe manner. The patient is advised that stopping prescribed medication may cause an increase in symptoms and possible medication withdrawal symptoms. The patient is informed an emergency room evaluation may be necessary if this occurs.       Spent 50 minutes with patient today which more than 50% of that time was spent on counseling and coordination of care. Juany Wong 8/29/2018        Note: Please excuse any typographical errors. Voice recognition software was used for this note and may cause mistakes.

## 2018-08-29 NOTE — MR AVS SNAPSHOT
[address] [address] 
[phone] Patient: Kelly Call MRN: [id number] Visit Information Date & Time Provider Department Dept. Phone Encounter #  
 2018  1:20 PM Teo [facility] [id number] Follow-up Instructions Return in about 3 months (around 2018). Follow-up and Disposition History Upcoming Health Maintenance Date Due Pneumococcal 19-64 Highest Risk (1 of 3 - PCV13) 1976 ZOSTER VACCINE AGE 60> 10/23/2017 Influenza Age 5 to Adult 2018 MEDICARE YEARLY EXAM 2018 PAP AKA CERVICAL CYTOLOGY 2019 BREAST CANCER SCRN MAMMOGRAM 2020 COLONOSCOPY 2021 DTaP/Tdap/Td series (2 - Td) 2025 Allergies as of 2018  Review Complete On: 2018 By: CORY Davison Severity Noted Reaction Type Reactions Adhesive    Rash Aspirin  2011    Other (comments), Nausea and Vomiting G6PD 
GI BLEED AND ULCER Contrast Agent [Iodine]    Rash Allergic to CT Dye  
 Dilantin [Phenytoin Sodium Extended]    Other (comments) Difficulty waking Iodinated Contrast- Oral And Iv Dye  2016    Rash \"bumps on face\" Lipitor [Atorvastatin]  2016    Other (comments) PKU Pcn [Penicillins]    Rash, Hives \"sores all over the body\" Phenytoin  2016    Unknown (comments) \"Trouble waking up\" Sulfa (Sulfonamide Antibiotics)    Rash, Nausea and Vomiting G6PD 
G6PD Tegretol [Carbamazepine]    Other (comments), Vertigo \"Trouble waking up\" Difficulty waking up Current Immunizations  Reviewed on 2018 Name Date Influenza Vaccine 2015  7:48 AM  
 Influenza Vaccine (Quad) PF 2018  2:28 PM  
 Tdap 2015 Not reviewed this visit You Were Diagnosed With   
  
 Codes Comments Chronic midline low back pain with right-sided sciatica     ICD-10-CM: M54.41, G89.29 ICD-9-CM: 724.2, 724.3, 338.29 Postlaminectomy syndrome, lumbar region     ICD-10-CM: M96.1 ICD-9-CM: 722.83 Degeneration of lumbar or lumbosacral intervertebral disc     ICD-10-CM: M51.37 
ICD-9-CM: 722.52 Lumbar post-laminectomy syndrome     ICD-10-CM: M96.1 ICD-9-CM: 722.83 Chronic pain syndrome     ICD-10-CM: G89.4 ICD-9-CM: 338. 4 Vitals BP Pulse Temp Resp Height(growth percentile) Weight(growth percentile) (!) 155/95 (BP 1 Location: Left arm, BP Patient Position: Sitting) 78 99.4 °F (37.4 °C) 16 5' 2\" (1.575 m) 147 lb (66.7 kg) LMP BMI OB Status Smoking Status 08/28/2002 26.89 kg/m2 Postmenopausal Never Smoker BMI and BSA Data Body Mass Index Body Surface Area  
 26.89 kg/m 2 1.71 m 2 Preferred Pharmacy Pharmacy Name Phone [place] [id number] [phone] Your Updated Medication List  
  
   
This list is accurate as of 8/29/18  3:04 PM.  Always use your most recent med list.  
  
  
  
  
 acetaminophen 500 mg tablet Commonly known as:  TYLENOL Take  by mouth every six (6) hours as needed for Pain. cetirizine 10 mg tablet Commonly known as:  ZYRTEC Take 10 mg by mouth daily. CLARITIN 10 mg tablet Generic drug:  loratadine Take 10 mg by mouth daily as needed. diclofenac 3 % topical gel Commonly known as:  Sawyer Duffy Apply 2-4 g to affected area two (2) times a day for 30 days. As needed for joint pain. Apply to painful areas * fentaNYL 37.5 mcg/hour Pt72  
1 Patch by TransDERmal route every seventy-two (72) hours. Max Daily Amount: 1 Patch. for chronic, severe, refractory pain. Mylan, Sandoz, or [place] brands preferred * fentaNYL 25 mcg/hr PATCH Commonly known as:  Cori Parrot  
 1 Patch by TransDERmal route every seventy-two (72) hours for 30 days. Max Daily Amount: 1 Patch. * fentaNYL 12 mcg/hr patch Commonly known as:  DURAGESIC  
1 Patch by TransDERmal route every seventy-two (72) hours for 30 days. Max Daily Amount: 1 Patch. Start taking on:  9/18/2018  
  
 fluticasone 50 mcg/actuation nasal spray Commonly known as:  Michaelyn Drought 2 Sprays by Both Nostrils route daily. levothyroxine 100 mcg tablet Commonly known as:  SYNTHROID  
TAKE 1 TABLET BY MOUTH DAILY (BEFORE BREAKFAST) lidocaine 5 % ointment Commonly known as:  XYLOCAINE Apply 1-2 g to affected area as needed for Pain. To painful areas  
  
 methocarbamol 750 mg tablet Commonly known as:  ROBAXIN  
TAKE 1 TAB BY MOUTH THREE (3) TIMES DAILY. AS NEEDED FOR MUSCLE SPASMS MOVANTIK 12.5 mg Tab tablet Generic drug:  naloxegol Take 12.5 mg by mouth Daily (before breakfast). ofloxacin 0.3 % otic solution Commonly known as:  FLOXIN Administer 5 Drops in left ear daily. omega 3-DHA-EPA-fish oil 1,000 mg (120 mg-180 mg) capsule Take  by mouth. * ondansetron 8 mg disintegrating tablet Commonly known as:  ZOFRAN ODT Take 1 Tab by mouth every eight (8) hours as needed for Nausea. * ondansetron 8 mg disintegrating tablet Commonly known as:  ZOFRAN ODT Take 1 Tab by mouth every eight (8) hours as needed for Nausea. * traMADol 50 mg tablet Commonly known as:  ULTRAM  
Take 1-2 Tabs by mouth three (3) times daily as needed for Pain for up to 30 days. Max Daily Amount: 300 mg. Start taking on:  9/3/2018 * traMADol 50 mg tablet Commonly known as:  ULTRAM  
Take 1-2 Tabs by mouth three (3) times daily as needed for Pain for up to 30 days. Max Daily Amount: 300 mg. Start taking on:  10/2/2018 * traMADol 50 mg tablet Commonly known as:  ULTRAM  
Take 1-2 Tabs by mouth three (3) times daily as needed for Pain for up to 30 days. Max Daily Amount: 300 mg. Start taking on:  2018  
  
 traZODone 50 mg tablet Commonly known as:  DESYREL  
TAKE 2 TABS BY MOUTH NIGHTLY. AS NEEDED FOR INSOMNIA  
  
 VITAMIN D3 2,000 unit Tab Generic drug:  cholecalciferol (vitamin D3) Take  by mouth. XIIDRA 5 % Dpet Generic drug:  lifitegrast  
Apply  to eye. ZANTAC 150 mg tablet Generic drug:  raNITIdine Take 150 mg by mouth daily as needed. * Notice: This list has 8 medication(s) that are the same as other medications prescribed for you. Read the directions carefully, and ask your doctor or other care provider to review them with you. Prescriptions Printed Refills  
 traMADol (ULTRAM) 50 mg tablet 0 Starting on: 9/3/2018 Sig: Take 1-2 Tabs by mouth three (3) times daily as needed for Pain for up to 30 days. Max Daily Amount: 300 mg. Class: Print Route: Oral  
 fentaNYL (DURAGESIC) 12 mcg/hr patch 0 Starting on: 2018 Si Patch by TransDERmal route every seventy-two (72) hours for 30 days. Max Daily Amount: 1 Patch. Class: Print Route: TransDERmal  
 traMADol (ULTRAM) 50 mg tablet 0 Starting on: 10/2/2018 Sig: Take 1-2 Tabs by mouth three (3) times daily as needed for Pain for up to 30 days. Max Daily Amount: 300 mg. Class: Print Route: Oral  
 traMADol (ULTRAM) 50 mg tablet 0 Starting on: 2018 Sig: Take 1-2 Tabs by mouth three (3) times daily as needed for Pain for up to 30 days. Max Daily Amount: 300 mg. Class: Print Route: Oral  
  
Prescriptions Sent to Pharmacy Refills  
 diclofenac (SOLARAZE) 3 % topical gel 5 Sig: Apply 2-4 g to affected area two (2) times a day for 30 days. As needed for joint pain. Apply to painful areas Class: Normal  
 Pharmacy: [address] [place] [address], [facility]  #: [phone]  Route: Topical  
 ondansetron (ZOFRAN ODT) 8 mg disintegrating tablet 2  
 Sig: Take 1 Tab by mouth every eight (8) hours as needed for Nausea. Class: Normal  
 Pharmacy: [address] [place] [address], [address] [place] [address] Ph #: [phone] Route: Oral  
 methocarbamol (ROBAXIN) 750 mg tablet 2 Sig: TAKE 1 TAB BY MOUTH THREE (3) TIMES DAILY. AS NEEDED FOR MUSCLE SPASMS Class: Normal  
 Pharmacy: [address] [place] [address], [address] [place] [address] Ph #: [phone] We Performed the Following AMB SUPPLY ORDER [[id number] Custom] Comments: H-Wave for home use. 30 days. No substitute. Follow-up Instructions Return in about 3 months (around 11/29/2018). Patient Instructions 1. Modify current plan with no evidence of addiction or diversion. Stable on current medication without adverse events. 2. Refill methocarbamol 750 mg up to 3 times daily as needed. Has 4 refills left 3. Refill and adjust fentanyl 25 mg patch down to 12 mcg patch every 72 hours x 1 month, then discontinue. 4. Refill tramadol. Please take 12 tablets up to 3 times daily 5. Refill diclofenac gel 3% 34 times daily 6. Add H wave. Use as directed. 30-day trial 
7. At home exercise program.  Exercise/stretching were demonstrated in the office today. 8. Refill Zofran 8 mg up to 3 times daily as needed for nausea. 2 refills. 9. Naloxone 4 mg nasal spray for opioid induced respiratory depression emergency only. 10. Discussed risks of addiction, dependency, and opioid induced hyperalgesia. 11. Please remember to call at least 5 business days prior to your medication refill. 12. Return to clinic in 3 months. Please call and cancel your appointment and pain management agreement if you do decide to transfer your care. Introducing [facility]! [facility] introduces [employer] patient portal. Now you can access parts of your medical record, email your doctor's office, and request medication refills online. 1. In your internet browser, go to [email or web address] 2. Click on the First Time User? Click Here link in the Sign In box. You will see the New Member Sign Up page. 3. Enter your [employer] Access Code exactly as it appears below. You will not need to use this code after youve completed the sign-up process. If you do not sign up before the expiration date, you must request a new code. · [employer] Access Code: [email or web address] Expires: 11/27/2018  3:04 PM 
 
4. Enter the last four digits of your Social Security Number (xxxx) and Date of Birth (mm/dd/yyyy) as indicated and click Submit. You will be taken to the next sign-up page. 5. Create a Yi Chang Ou Sai ITt ID. This will be your [employer] login ID and cannot be changed, so think of one that is secure and easy to remember. 6. Create a [employer] password. You can change your password at any time. 7. Enter your Password Reset Question and Answer. This can be used at a later time if you forget your password. 8. Enter your e-mail address. You will receive e-mail notification when new information is available in [address] 9. Click Sign Up. You can now view and download portions of your medical record. 10. Click the Download Summary menu link to download a portable copy of your medical information. If you have questions, please visit the Frequently Asked Questions section of the [employer] website. Remember, [employer] is NOT to be used for urgent needs. For medical emergencies, dial 911. Now available from your iPhone and Android! Please provide this summary of care documentation to your next provider. Your primary care clinician is listed as Mahesh 51. If you have any questions after today's visit, please call [phone].

## 2018-08-29 NOTE — PROGRESS NOTES
Nursing Notes    Patient presents to the office today in follow-up. Patient rates her pain at 8/10 on the numerical pain scale. Reviewed medications with counts as follows:    Rx Date filled Qty Dispensed Pill Count Last Dose Short   Tramadol 50 mg 07/19/18 180 37 This am  no   Fentanyl 25 mcg 08/19/18 10 7+1 on This am no         Comments: Patient is here today for a follow up appt today she states her pain level today is an 8  PHQ 9 was done patient denies any depression. She states she had a procedure done on her ar Dr Alina Nick   She had an RFA done on her right side. She goes back for the other side. She was given a medrol dose pack that is now complete. She also needs a refill on Robaxin and Diclofenac also       POC UDS was not performed in office today    Any new labs or imaging since last appointment? YES imaging done with the RFA  Colonoscopy was done also       Have you been to an emergency room (ER) or urgent care clinic since your last visit? NO            Have you been hospitalized since your last visit? NO     If yes, where, when, and reason for visit? Have you seen or consulted any other health care providers outside of the [address] [place] [address]  since your last visit? YES   Dr Alina Nick   If yes, where, when, and reason for visit? Ms. Beni Cronin has a reminder for a \"due or due soon\" health maintenance. I have asked that she contact her primary care provider for follow-up on this health maintenance.

## 2018-09-05 RX ORDER — LEVOTHYROXINE SODIUM 100 UG/1
TABLET ORAL
Qty: 90 TAB | Refills: 1 | Status: SHIPPED | OUTPATIENT
Start: 2018-09-05 | End: 2019-03-15 | Stop reason: SDUPTHER

## 2018-09-06 ENCOUNTER — TELEPHONE (OUTPATIENT)
Dept: PAIN MANAGEMENT | Age: 63
End: 2018-09-06

## 2018-09-06 NOTE — TELEPHONE ENCOUNTER
Lynette Harper the  called . She was at the last office visit with patient on [id number] and she seen where [facility] wrote script for patient 180 ct Tramadol but the patient dropped office Tramadol script for 150 at [facility] - is that correct.   She does not want the patient to get in any trouble - please call her on this

## 2018-09-06 NOTE — TELEPHONE ENCOUNTER
I called the  back and was not able to reach her. A message was left and the number to the nurse triage line was provided. A chart review was done and the all three prescriptions done at the last OV were for #180 tabs.

## 2018-09-12 ENCOUNTER — TELEPHONE (OUTPATIENT)
Dept: PAIN MANAGEMENT | Age: 63
End: 2018-09-12

## 2018-09-12 NOTE — TELEPHONE ENCOUNTER
09/12/2018 PER TELEPHONE CONVERSATION WITH OBEY TREJO. THE REFILL FOR DICLOFENAC SHOULD GO TO [facility].

## 2018-09-21 ENCOUNTER — OFFICE VISIT (OUTPATIENT)
Dept: FAMILY MEDICINE CLINIC | Age: 63
End: 2018-09-21

## 2018-09-21 VITALS
HEART RATE: 81 BPM | HEIGHT: 62 IN | TEMPERATURE: 98.6 F | DIASTOLIC BLOOD PRESSURE: 82 MMHG | BODY MASS INDEX: 27.6 KG/M2 | OXYGEN SATURATION: 98 % | WEIGHT: 150 LBS | SYSTOLIC BLOOD PRESSURE: 150 MMHG

## 2018-09-21 DIAGNOSIS — Z23 ENCOUNTER FOR IMMUNIZATION: ICD-10-CM

## 2018-09-21 DIAGNOSIS — J01.00 ACUTE NON-RECURRENT MAXILLARY SINUSITIS: Primary | ICD-10-CM

## 2018-09-21 RX ORDER — DOXYCYCLINE 100 MG/1
100 TABLET ORAL 2 TIMES DAILY
Qty: 20 TAB | Refills: 0 | Status: SHIPPED | OUTPATIENT
Start: 2018-09-21 | End: 2018-10-01

## 2018-09-21 NOTE — PATIENT INSTRUCTIONS
Sinusitis: Care Instructions Your Care Instructions Sinusitis is an infection of the lining of the sinus cavities in your head. Sinusitis often follows a cold. It causes pain and pressure in your head and face. In most cases, sinusitis gets better on its own in 1 to 2 weeks. But some mild symptoms may last for several weeks. Sometimes antibiotics are needed. Follow-up care is a key part of your treatment and safety. Be sure to make and go to all appointments, and call your doctor if you are having problems. It's also a good idea to know your test results and keep a list of the medicines you take. How can you care for yourself at home? · Take an over-the-counter pain medicine, such as acetaminophen (Tylenol), ibuprofen (Advil, Motrin), or naproxen (Aleve). Read and follow all instructions on the label. · If the doctor prescribed antibiotics, take them as directed. Do not stop taking them just because you feel better. You need to take the full course of antibiotics. · Be careful when taking over-the-counter cold or flu medicines and Tylenol at the same time. Many of these medicines have acetaminophen, which is Tylenol. Read the labels to make sure that you are not taking more than the recommended dose. Too much acetaminophen (Tylenol) can be harmful. · Breathe warm, moist air from a steamy shower, a hot bath, or a sink filled with hot water. Avoid cold, dry air. Using a humidifier in your home may help. Follow the directions for cleaning the machine. · Use saline (saltwater) nasal washes to help keep your nasal passages open and wash out mucus and bacteria. You can buy saline nose drops at a grocery store or drugstore. Or you can make your own at home by adding 1 teaspoon of salt and 1 teaspoon of baking soda to 2 cups of distilled water. If you make your own, fill a bulb syringe with the solution, insert the tip into your nostril, and squeeze gently. Fox Dashick your nose. · Put a hot, wet towel or a warm gel pack on your face 3 or 4 times a day for 5 to 10 minutes each time. · Try a decongestant nasal spray like oxymetazoline (Afrin). Do not use it for more than 3 days in a row. Using it for more than 3 days can make your congestion worse. When should you call for help? Call your doctor now or seek immediate medical care if: 
  · You have new or worse swelling or redness in your face or around your eyes.  
  · You have a new or higher fever.  
 Watch closely for changes in your health, and be sure to contact your doctor if: 
  · You have new or worse facial pain.  
  · The mucus from your nose becomes thicker (like pus) or has new blood in it.  
  · You are not getting better as expected. Where can you learn more? Go to http://celia-staci.info/. Enter F267 in the search box to learn more about \"Sinusitis: Care Instructions. \" Current as of: May 12, 2017 Content Version: 11.7 © 4594-4120 [employer]. Care instructions adapted under license by [facility] (which disclaims liability or warranty for this information). If you have questions about a medical condition or this instruction, always ask your healthcare professional. [address] any warranty or liability for your use of this information.

## 2018-09-21 NOTE — PROGRESS NOTES
Assessment/Plan: *Diagnoses and all orders for this visit: 
 
1. Acute non-recurrent maxillary sinusitis 
-     doxycycline (ADOXA) 100 mg tablet; Take 1 Tab by mouth two (2) times a day for 10 days. 2. Encounter for immunization -     Influenza virus vaccine (QUADRIVALENT PRES FREE SYRINGE) IM (76335) -     AK IMMUNIZ ADMIN,1 SINGLE/COMB VAC/TOXOID Will return if not better. Will push fluids. Will try some Sudafed. The plan was discussed with the patient. The patient verbalized understanding and is in agreement with the plan. All medication potential side effects were discussed with the patient. 
 
------------------------------------------------------------------------------------------------------------------- Kike Parr is a 63 y.o. female and presents with Sinus Pain and Generalized Body Aches Subjective: 
Pt here for > 1 week of sinus congestion, thick mucus draining postnasally, able to cough it up, sinus pressure. Not coughing yet. Denies any fevers. ROS: 
Review of Systems - Negative except above mentioned symptoms The problem list was updated as a part of today's visit. Patient Active Problem List  
Diagnosis Code  Chronic low back pain M54.5, G89.29  
 Postlaminectomy syndrome, lumbar region M96.1  Degeneration of lumbar intervertebral disc M51.37  
 Pain in joint, multiple sites M25.50  Pain in joint, shoulder region M25.519  Generalized osteoarthritis M15.9  History of breast cancer C50.919  
 Hypothyroidism E03.9  
 HLD (hyperlipidemia) E78.5  G6PD (glucose 6 phosphatase deficiency)  Shoulder pain, left M25.512  Recurrent UTI N39.0  Chemotherapy-induced neuropathy ([facility] [address] G62.0, T45.1X5A  Chest wall pain following surgery R07.89, G89.18  Renal insufficiency N28.9  Proteinuria R80.9  Chronic insomnia F51.04  
 Paroxysmal sleep G47.419  
 Encounter for long-term (current) use of high-risk medication Z79.899  Foraminal stenosis of lumbar region M99.83  Lumbar facet arthropathy (HCC) M46.96  
 Lumbar post-laminectomy syndrome M96.1  Lumbar neuritis M54.16  Spasm of back muscles M62.830  
 Drug-induced constipation K59.03  
 Bilateral sacroiliitis (HCC) M46.1  Drug-induced nausea and vomiting R11.2, T50.905A  Irritable bowel syndrome with both constipation and diarrhea K58.2  Fibromyalgia M79.7  Anxiety F41.9  Depression F32.9  Hypersomnolence G47.10  Chronic pain syndrome G89.4  Recurrent depression ([facility] [address] F33.9 The PSH, FH were reviewed. SH: Social History Substance Use Topics  Smoking status: Never Smoker  Smokeless tobacco: Never Used  Alcohol use No  
 
 
Medications/Allergies: 
Current Outpatient Prescriptions on File Prior to Visit Medication Sig Dispense Refill  levothyroxine (SYNTHROID) 100 mcg tablet TAKE 1 TABLET DAILY BEFORE BREAKFAST 90 Tab 1  cholecalciferol, vitamin D3, (VITAMIN D3) 2,000 unit tab Take  by mouth.  traMADol (ULTRAM) 50 mg tablet Take 1-2 Tabs by mouth three (3) times daily as needed for Pain for up to 30 days. Max Daily Amount: 300 mg. 180 Tab 0  
 fentaNYL (DURAGESIC) 12 mcg/hr patch 1 Patch by TransDERmal route every seventy-two (72) hours for 30 days. Max Daily Amount: 1 Patch. 10 Patch 0  
 diclofenac (SOLARAZE) 3 % topical gel Apply 2-4 g to affected area two (2) times a day for 30 days. As needed for joint pain. Apply to painful areas 100 g 5  
 methocarbamol (ROBAXIN) 750 mg tablet TAKE 1 TAB BY MOUTH THREE (3) TIMES DAILY. AS NEEDED FOR MUSCLE SPASMS 90 Tab 2  
 fluticasone (FLONASE) 50 mcg/actuation nasal spray 2 Sprays by Both Nostrils route daily. 1 Bottle 0  
 ondansetron (ZOFRAN ODT) 8 mg disintegrating tablet Take 1 Tab by mouth every eight (8) hours as needed for Nausea. 30 Tab 2  
 Omega-3-DHA-EPA-Fish Oil 1,000 mg (120 mg-180 mg) cap Take  by mouth.  naloxegol (MOVANTIK) 12.5 mg tab tablet Take 12.5 mg by mouth Daily (before breakfast).  acetaminophen (TYLENOL) 500 mg tablet Take  by mouth every six (6) hours as needed for Pain.  ranitidine (ZANTAC) 150 mg tablet Take 150 mg by mouth daily as needed.  loratadine (CLARITIN) 10 mg tablet Take 10 mg by mouth daily as needed.  [START ON 10/2/2018] traMADol (ULTRAM) 50 mg tablet Take 1-2 Tabs by mouth three (3) times daily as needed for Pain for up to 30 days. Max Daily Amount: 300 mg. 180 Tab 0  
 [START ON 11/1/2018] traMADol (ULTRAM) 50 mg tablet Take 1-2 Tabs by mouth three (3) times daily as needed for Pain for up to 30 days. Max Daily Amount: 300 mg. 180 Tab 0  
 ondansetron (ZOFRAN ODT) 8 mg disintegrating tablet Take 1 Tab by mouth every eight (8) hours as needed for Nausea. 30 Tab 2  
 ofloxacin (FLOXIN) 0.3 % otic solution Administer 5 Drops in left ear daily. 5 mL 0  
 fentaNYL 37.5 mcg/hour pt72 1 Patch by TransDERmal route every seventy-two (72) hours. Max Daily Amount: 1 Patch. for chronic, severe, refractory pain. Mylan, Sandoz, or Mallinckrodt brands preferred 10 Patch 0  
 cetirizine (ZYRTEC) 10 mg tablet Take 10 mg by mouth daily.  lifitegrast (XIIDRA) 5 % dpet Apply  to eye.  traZODone (DESYREL) 50 mg tablet TAKE 2 TABS BY MOUTH NIGHTLY. AS NEEDED FOR INSOMNIA 60 Tab 3  
 lidocaine (XYLOCAINE) 5 % ointment Apply 1-2 g to affected area as needed for Pain. To painful areas 120 g 5 No current facility-administered medications on file prior to visit. Allergies Allergen Reactions  Adhesive Rash  Aspirin Other (comments) and Nausea and Vomiting G6PD 
G6PD 
GI BLEED AND ULCER  
 Contrast Agent [Iodine] Rash Allergic to CT Dye  
 Dilantin [Phenytoin Sodium Extended] Other (comments) Difficulty waking  Iodinated Contrast- Oral And Iv Dye Rash \"bumps on face\"  Lipitor [Atorvastatin] Other (comments)   PKU   
  Pcn [Penicillins] Rash and Hives \"sores all over the body\"  Phenytoin Unknown (comments) \"Trouble waking up\"  Sulfa (Sulfonamide Antibiotics) Rash and Nausea and Vomiting G6PD 
G6PD  Tegretol [Carbamazepine] Other (comments) and Vertigo \"Trouble waking up\" Difficulty waking up Health Maintenance:  
Health Maintenance Topic Date Due  Pneumococcal 19-64 Highest Risk (1 of 3 - PCV13) 12/23/1976  ZOSTER VACCINE AGE 60>  10/23/2017  Influenza Age 5 to Adult  08/01/2018  MEDICARE YEARLY EXAM  09/21/2018  PAP AKA CERVICAL CYTOLOGY  12/13/2019  BREAST CANCER SCRN MAMMOGRAM  02/28/2020  COLONOSCOPY  06/22/2021  
 DTaP/Tdap/Td series (2 - Td) 04/01/2025  Hepatitis C Screening  Completed Objective: 
Visit Vitals  /82 (BP 1 Location: Left arm, BP Patient Position: Sitting)  Pulse 81  Temp 98.6 °F (37 °C) (Oral)  Ht 5' 2\" (1.575 m)  Wt 150 lb (68 kg)  LMP 08/28/2002  SpO2 98%  BMI 27.44 kg/m2 Nurses notes and VS reviewed. Physical Examination: General appearance - looks tired, like she is not feeling well. Ears - bilateral TM's and external ear canals normal 
Nose - mucosal congestion Mouth - erythematous Neck - supple, no significant adenopathy Chest - clear to auscultation, no wheezes, rales or rhonchi, symmetric air entry Heart - normal rate and regular rhythm Labwork and Ancillary Studies: CBC w/Diff Lab Results Component Value Date/Time WBC 6.5 05/15/2018 12:00 AM  
 HGB 11.1 05/15/2018 12:00 AM  
 PLATELET 858 90/86/7808 12:00 AM  
  
 
 Basic Metabolic Profile Lab Results Component Value Date/Time  Sodium 144 05/15/2018 12:00 AM  
 Potassium 4.4 05/15/2018 12:00 AM  
 Chloride 100 05/15/2018 12:00 AM  
 CO2 22 05/15/2018 12:00 AM  
 Anion gap 8 11/24/2015 10:47 AM  
 Glucose 58 (L) 05/15/2018 12:00 AM  
 BUN 16 05/15/2018 12:00 AM  
 Creatinine 0.80 05/15/2018 12:00 AM  
 BUN/Creatinine ratio 20 05/15/2018 12:00 AM  
 GFR est AA 93 05/15/2018 12:00 AM  
 GFR est non-AA 80 05/15/2018 12:00 AM  
 Calcium 9.0 05/15/2018 12:00 AM  
  
  
LFT Lab Results Component Value Date/Time ALT (SGPT) 10 05/15/2018 12:00 AM  
 AST (SGOT) 18 05/15/2018 12:00 AM  
 Alk. phosphatase 60 05/15/2018 12:00 AM  
 Bilirubin, direct 0.06 05/15/2018 12:00 AM  
 Bilirubin, total 0.2 05/15/2018 12:00 AM  
 
 
 
Cholesterol Lab Results Component Value Date/Time  Cholesterol, total 240 (H) 05/15/2018 12:00 AM  
 HDL Cholesterol 50 05/15/2018 12:00 AM  
 LDL, calculated 152 (H) 05/15/2018 12:00 AM  
 Triglyceride 188 (H) 05/15/2018 12:00 AM  
 CHOL/HDL Ratio 5.9 (H) 11/24/2015 10:47 AM

## 2018-09-21 NOTE — PROGRESS NOTES
Chelsea Beavers is a 63 y.o. female (: 1955) presenting to address: sinus pain, and generalized body aches. Patient unsure about taking the flu vaccine today. Flu vaccine administered IM to the patients left deltoid on 2018 by Mohan Buckner LPN with consent. Patient tolerated procedure well, with no bleeding after administration noted. No reactions noted. Chief Complaint Patient presents with  Sinus Pain  Generalized Body Aches Vitals:  
 18 1019 BP: 150/90 Pulse: 81 Temp: 98.6 °F (37 °C) TempSrc: Oral  
SpO2: 98% Weight: 150 lb (68 kg) Height: 5' 2\" (1.575 m) PainSc:   7 PainLoc: Back LMP: 2002 Hearing/Vision: No exam data present Learning Assessment:  
 
Learning Assessment 10/9/2017 PRIMARY LEARNER Patient HIGHEST LEVEL OF EDUCATION - PRIMARY LEARNER  -  
BARRIERS PRIMARY LEARNER -  
CO-LEARNER CAREGIVER -  
PRIMARY LANGUAGE ENGLISH  
LEARNER PREFERENCE PRIMARY READING  
  LISTENING  
  PICTURES  
  VIDEOS  
  DEMONSTRATION  
ANSWERED BY self RELATIONSHIP SELF Depression Screening: PHQ over the last two weeks 5/15/2018 Little interest or pleasure in doing things Not at all Feeling down, depressed, irritable, or hopeless Not at all Total Score PHQ 2 0 Fall Risk Assessment:  
 
Fall Risk Assessment, last 12 mths 2018 Able to walk? Yes Fall in past 12 months? Yes Fall with injury? No  
Number of falls in past 12 months 1 Fall Risk Score 1 Abuse Screening:  
 
Abuse Screening Questionnaire 5/15/2018 Do you ever feel afraid of your partner? Charlee Santamaria Are you in a relationship with someone who physically or mentally threatens you? Charlee Santamaria Is it safe for you to go home? Demetri Spine Coordination of Care Questionaire: 1. Have you been to the ER, urgent care clinic since your last visit? Hospitalized since your last visit? NO 
 
2.  Have you seen or consulted any other health care providers outside of the [facility] since your last visit? Include any pap smears or colon screening. YES Radiofrequency ablasion on right side on 08/08/18 Dr. Christensen July. Advanced Directive: 1. Do you have an Advanced Directive? YES 
 
2. Would you like information on Advanced Directives?  NO

## 2018-09-21 NOTE — MR AVS SNAPSHOT
[address]  [address]7 [facility] [id number] [phone] Patient: Charlie Avila MRN: [id number] :1955 Visit Information Date & Time Provider Department Dept. Phone Encounter #  
 2018 10:15 AM Hayley Jo, 3 [facility] [phone] [id number] Follow-up Instructions Return if symptoms worsen or fail to improve. Your Appointments 2018  2:20 PM  
Follow Up with CORY Marti [facility] ([facility] SCHEDULING) Appt Note: Return in about 3 months (around 2018). [address] [address]  
[phone] [facility] [address] [id number] Allergies as of 2018  Review Complete On: 2018 By: Hayley Jo MD  
  
 Severity Noted Reaction Type Reactions Adhesive    Rash Aspirin  2007    Other (comments), Nausea and Vomiting G6PD 
G6PD 
GI BLEED AND ULCER Contrast Agent [Iodine]    Rash Allergic to CT Dye  
 Dilantin [Phenytoin Sodium Extended]    Other (comments) Difficulty waking Iodinated Contrast- Oral And Iv Dye  2016    Rash \"bumps on face\" Lipitor [Atorvastatin]  2016    Other (comments) PKU Pcn [Penicillins]    Rash, Hives \"sores all over the body\" Phenytoin  2016    Unknown (comments) \"Trouble waking up\" Sulfa (Sulfonamide Antibiotics)    Rash, Nausea and Vomiting G6PD 
G6PD Tegretol [Carbamazepine]    Other (comments), Vertigo \"Trouble waking up\" Difficulty waking up Current Immunizations  Reviewed on 2018 Name Date Influenza Vaccine 2015  7:48 AM  
 Influenza Vaccine (Quad) PF  Incomplete, 2018  2:28 PM  
 Tdap 2015 Not reviewed this visit You Were Diagnosed With   
  
 Codes Comments  Acute non-recurrent maxillary sinusitis    -  Primary ICD-10-CM: J01.00 
 ICD-9-CM: 461.0 Encounter for immunization     ICD-10-CM: A30 ICD-9-CM: V03.89 Vitals BP Pulse Temp Height(growth percentile) Weight(growth percentile) LMP  
 150/90 (BP 1 Location: Left arm, BP Patient Position: Sitting) 81 98.6 °F (37 °C) (Oral) 5' 2\" (1.575 m) 150 lb (68 kg) 08/28/2002 SpO2 BMI OB Status Smoking Status 98% 27.44 kg/m2 Postmenopausal Never Smoker BMI and BSA Data Body Mass Index Body Surface Area  
 27.44 kg/m 2 1.72 m 2 Preferred Pharmacy Pharmacy Name Phone [address] [facility] [phone] [facility] [phone] Your Updated Medication List  
  
   
This list is accurate as of 9/21/18 11:00 AM.  Always use your most recent med list.  
  
  
  
  
 acetaminophen 500 mg tablet Commonly known as:  TYLENOL Take  by mouth every six (6) hours as needed for Pain. cetirizine 10 mg tablet Commonly known as:  ZYRTEC Take 10 mg by mouth daily. CLARITIN 10 mg tablet Generic drug:  loratadine Take 10 mg by mouth daily as needed. diclofenac 3 % topical gel Commonly known as:  Krista Boxer Apply 2-4 g to affected area two (2) times a day for 30 days. As needed for joint pain. Apply to painful areas  
  
 doxycycline 100 mg tablet Commonly known as:  ADOXA Take 1 Tab by mouth two (2) times a day for 10 days. * fentaNYL 37.5 mcg/hour Pt72  
1 Patch by TransDERmal route every seventy-two (72) hours. Max Daily Amount: 1 Patch. for chronic, severe, refractory pain. Mylan, Sandoz, or Mallinckrodt brands preferred * fentaNYL 12 mcg/hr patch Commonly known as:  DURAGESIC  
1 Patch by TransDERmal route every seventy-two (72) hours for 30 days. Max Daily Amount: 1 Patch. fluticasone 50 mcg/actuation nasal spray Commonly known as:  Gm Romero 2 Sprays by Both Nostrils route daily. levothyroxine 100 mcg tablet Commonly known as:  SYNTHROID  
 TAKE 1 TABLET DAILY BEFORE BREAKFAST  
  
 lidocaine 5 % ointment Commonly known as:  XYLOCAINE Apply 1-2 g to affected area as needed for Pain. To painful areas  
  
 methocarbamol 750 mg tablet Commonly known as:  ROBAXIN  
TAKE 1 TAB BY MOUTH THREE (3) TIMES DAILY. AS NEEDED FOR MUSCLE SPASMS MOVANTIK 12.5 mg Tab tablet Generic drug:  naloxegol Take 12.5 mg by mouth Daily (before breakfast). ofloxacin 0.3 % otic solution Commonly known as:  FLOXIN Administer 5 Drops in left ear daily. omega 3-DHA-EPA-fish oil 1,000 mg (120 mg-180 mg) capsule Take  by mouth. * ondansetron 8 mg disintegrating tablet Commonly known as:  ZOFRAN ODT Take 1 Tab by mouth every eight (8) hours as needed for Nausea. * ondansetron 8 mg disintegrating tablet Commonly known as:  ZOFRAN ODT Take 1 Tab by mouth every eight (8) hours as needed for Nausea. * traMADol 50 mg tablet Commonly known as:  ULTRAM  
Take 1-2 Tabs by mouth three (3) times daily as needed for Pain for up to 30 days. Max Daily Amount: 300 mg.  
  
 * traMADol 50 mg tablet Commonly known as:  ULTRAM  
Take 1-2 Tabs by mouth three (3) times daily as needed for Pain for up to 30 days. Max Daily Amount: 300 mg. Start taking on:  10/2/2018 * traMADol 50 mg tablet Commonly known as:  ULTRAM  
Take 1-2 Tabs by mouth three (3) times daily as needed for Pain for up to 30 days. Max Daily Amount: 300 mg. Start taking on:  11/1/2018  
  
 traZODone 50 mg tablet Commonly known as:  DESYREL  
TAKE 2 TABS BY MOUTH NIGHTLY. AS NEEDED FOR INSOMNIA  
  
 VITAMIN D3 2,000 unit Tab Generic drug:  cholecalciferol (vitamin D3) Take  by mouth. XIIDRA 5 % Dpet Generic drug:  lifitegrast  
Apply  to eye. ZANTAC 150 mg tablet Generic drug:  raNITIdine Take 150 mg by mouth daily as needed. * Notice:   This list has 7 medication(s) that are the same as other medications prescribed for you. Read the directions carefully, and ask your doctor or other care provider to review them with you. Prescriptions Sent to Pharmacy Refills  
 doxycycline (ADOXA) 100 mg tablet 0 Sig: Take 1 Tab by mouth two (2) times a day for 10 days. Class: Normal  
 Pharmacy: [address] [place] [address], [id number] [address] [facility] #: [phone] Route: Oral  
  
We Performed the Following INFLUENZA VIRUS VAC QUAD,SPLIT,PRESV FREE SYRINGE IM [id number] CPT(R)] DC IMMUNIZ ADMIN,1 SINGLE/COMB VAC/TOXOID [id number] CPT(R)] Follow-up Instructions Return if symptoms worsen or fail to improve. Patient Instructions Sinusitis: Care Instructions Your Care Instructions Sinusitis is an infection of the lining of the sinus cavities in your head. Sinusitis often follows a cold. It causes pain and pressure in your head and face. In most cases, sinusitis gets better on its own in 1 to 2 weeks. But some mild symptoms may last for several weeks. Sometimes antibiotics are needed. Follow-up care is a key part of your treatment and safety. Be sure to make and go to all appointments, and call your doctor if you are having problems. It's also a good idea to know your test results and keep a list of the medicines you take. How can you care for yourself at home? · Take an over-the-counter pain medicine, such as acetaminophen (Tylenol), ibuprofen (Advil, Motrin), or naproxen (Aleve). Read and follow all instructions on the label. · If the doctor prescribed antibiotics, take them as directed. Do not stop taking them just because you feel better. You need to take the full course of antibiotics. · Be careful when taking over-the-counter cold or flu medicines and Tylenol at the same time. Many of these medicines have acetaminophen, which is Tylenol.  Read the labels to make sure that you are not taking more than the recommended dose. Too much acetaminophen (Tylenol) can be harmful. · Breathe warm, moist air from a steamy shower, a hot bath, or a sink filled with hot water. Avoid cold, dry air. Using a humidifier in your home may help. Follow the directions for cleaning the machine. · Use saline (saltwater) nasal washes to help keep your nasal passages open and wash out mucus and bacteria. You can buy saline nose drops at a grocery store or drugstore. Or you can make your own at home by adding 1 teaspoon of salt and 1 teaspoon of baking soda to 2 cups of distilled water. If you make your own, fill a bulb syringe with the solution, insert the tip into your nostril, and squeeze gently. Coralyn [place] your nose. · Put a hot, wet towel or a warm gel pack on your face 3 or 4 times a day for 5 to 10 minutes each time. · Try a decongestant nasal spray like oxymetazoline (Afrin). Do not use it for more than 3 days in a row. Using it for more than 3 days can make your congestion worse. When should you call for help? Call your doctor now or seek immediate medical care if: 
  · You have new or worse swelling or redness in your face or around your eyes.  
  · You have a new or higher fever.  
 Watch closely for changes in your health, and be sure to contact your doctor if: 
  · You have new or worse facial pain.  
  · The mucus from your nose becomes thicker (like pus) or has new blood in it.  
  · You are not getting better as expected. Where can you learn more? Go to http://celia-staci.info/. Enter U162 in the search box to learn more about \"Sinusitis: Care Instructions. \" Current as of: May 12, 2017 Content Version: 11.7 © 2745-9364 [employer], [employer]. Care instructions adapted under license by [employer] (which disclaims liability or warranty for this information).  If you have questions about a medical condition or this instruction, always ask your healthcare professional. [place] [address] any warranty or liability for your use of this information. Introducing [facility]! Ismael [place] introduces [employer] patient portal. Now you can access parts of your medical record, email your doctor's office, and request medication refills online. 1. In your internet browser, go to [email or web address] 2. Click on the First Time User? Click Here link in the Sign In box. You will see the New Member Sign Up page. 3. Enter your [employer] Access Code exactly as it appears below. You will not need to use this code after youve completed the sign-up process. If you do not sign up before the expiration date, you must request a new code. · [employer] Access Code: [email or web address] Expires: 11/27/2018  3:04 PM 
 
4. Enter the last four digits of your Social Security Number (xxxx) and Date of Birth (mm/dd/yyyy) as indicated and click Submit. You will be taken to the next sign-up page. 5. Create a [employer] ID. This will be your [employer] login ID and cannot be changed, so think of one that is secure and easy to remember. 6. Create a [employer] password. You can change your password at any time. 7. Enter your Password Reset Question and Answer. This can be used at a later time if you forget your password. 8. Enter your e-mail address. You will receive e-mail notification when new information is available in [address] 9. Click Sign Up. You can now view and download portions of your medical record. 10. Click the Download Summary menu link to download a portable copy of your medical information. If you have questions, please visit the Frequently Asked Questions section of the [employer] website. Remember, [employer] is NOT to be used for urgent needs. For medical emergencies, dial 911. Now available from your iPhone and Android! Please provide this summary of care documentation to your next provider. Your primary care clinician is listed as Mahesh 51. If you have any questions after today's visit, please call [phone].

## 2018-09-26 ENCOUNTER — TELEPHONE (OUTPATIENT)
Dept: FAMILY MEDICINE CLINIC | Age: 63
End: 2018-09-26

## 2018-09-26 NOTE — TELEPHONE ENCOUNTER
Patient left message on nurses station voicemail she was seen 9/21/18 and given doxycycline and is half way through her 10 day dose and feels worse she is requesting a new antibiotic be sent to her pharmacy

## 2018-09-27 RX ORDER — AZITHROMYCIN 500 MG/1
500 TABLET, FILM COATED ORAL DAILY
Qty: 7 TAB | Refills: 0 | Status: SHIPPED | OUTPATIENT
Start: 2018-09-27 | End: 2018-10-04

## 2018-10-01 ENCOUNTER — TELEPHONE (OUTPATIENT)
Dept: PAIN MANAGEMENT | Age: 63
End: 2018-10-01

## 2018-10-01 NOTE — TELEPHONE ENCOUNTER
Patient identified using two patient identifiers (name and )    Contacted patient regarding refill request for fentanyl; patient advised that she just filled fentanyl on 18 and that she would be due to refill on 10/20/18; patient advised that it is too early to refill at this time; patient verbalizes understanding and states that she was going by the written date and not the date she last filled; patient acknowledges understanding and states she will call back around 10/15/18.

## 2018-10-15 ENCOUNTER — TELEPHONE (OUTPATIENT)
Dept: PAIN MANAGEMENT | Age: 63
End: 2018-10-15

## 2018-10-16 NOTE — TELEPHONE ENCOUNTER
Called patient, Patient identified using two patient identifiers (name and ). I informed the patient that the refill for their Fentanyl 12 mcg/hr patch cannot be processed at this time, d/t the provider, Hayden Collins PA-C stating in his note to d/c it after being on it for 1 month. Patient states that Maritza Ko told her (as well as her  and ) that she would have another month of her Fentanyl. I told the patient that I can only go by what the provider stated in his note, since he is no longer with our practice. I then stated that if the patient is feel like they are experiencing sx/sm of withdrawals, to contact our office. Patient was upset and stated that she does not want to go through withdrawals. I told the patient that I will contact provider LUC Salgado(since she has an appt with her on 18) for advisement and/or to see whether or not she wants her to come in sooner. Patient verbalized understanding.  shows last fill date of Fentanyl 12 mcg/hr patch as 18.

## 2018-10-17 NOTE — TELEPHONE ENCOUNTER
Called patient back, Patient identified using two patient identifiers (name and ). I informed the patient what the provider, LUC So informed me. Patient stated she would like to be seen as soon as possible. I scheduled the patient for 2018 with Emily Sandoval at 1;15 pm. RBV'd by patient. Reminded patient to arrive 30 mins prior to appointment and to bring their medication bottles/boxes that we prescribe, even if empty. Patient verbalized understanding. Provider made aware.

## 2018-10-19 ENCOUNTER — OFFICE VISIT (OUTPATIENT)
Dept: PAIN MANAGEMENT | Age: 63
End: 2018-10-19

## 2018-10-19 VITALS
TEMPERATURE: 97.5 F | RESPIRATION RATE: 14 BRPM | BODY MASS INDEX: 26.68 KG/M2 | DIASTOLIC BLOOD PRESSURE: 88 MMHG | HEART RATE: 87 BPM | HEIGHT: 62 IN | WEIGHT: 145 LBS | SYSTOLIC BLOOD PRESSURE: 132 MMHG

## 2018-10-19 DIAGNOSIS — M51.37 DEGENERATION OF LUMBAR OR LUMBOSACRAL INTERVERTEBRAL DISC: ICD-10-CM

## 2018-10-19 DIAGNOSIS — M54.41 CHRONIC MIDLINE LOW BACK PAIN WITH RIGHT-SIDED SCIATICA: Primary | ICD-10-CM

## 2018-10-19 DIAGNOSIS — G89.29 CHRONIC MIDLINE LOW BACK PAIN WITH RIGHT-SIDED SCIATICA: Primary | ICD-10-CM

## 2018-10-19 DIAGNOSIS — G89.4 CHRONIC PAIN SYNDROME: ICD-10-CM

## 2018-10-19 DIAGNOSIS — M96.1 LUMBAR POST-LAMINECTOMY SYNDROME: ICD-10-CM

## 2018-10-19 DIAGNOSIS — Z79.899 ENCOUNTER FOR LONG-TERM (CURRENT) USE OF HIGH-RISK MEDICATION: ICD-10-CM

## 2018-10-19 RX ORDER — TRAMADOL HYDROCHLORIDE 50 MG/1
100 TABLET ORAL
Qty: 240 TAB | Refills: 0 | Status: SHIPPED | OUTPATIENT
Start: 2018-11-13 | End: 2018-12-31 | Stop reason: SDUPTHER

## 2018-10-19 RX ORDER — ACETAMINOPHEN 500 MG
1000 TABLET ORAL EVERY 8 HOURS
Qty: 180 TAB | Refills: 2 | Status: SHIPPED | OUTPATIENT
Start: 2018-10-19 | End: 2018-11-18
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
## 2018-10-19 NOTE — PATIENT INSTRUCTIONS
Learning About Sleeping Well  What does sleeping well mean? Sleeping well means getting enough sleep. How much sleep is enough varies among people. The number of hours you sleep is not as important as how you feel when you wake up. If you do not feel refreshed, you probably need more sleep. Another sign of not getting enough sleep is feeling tired during the day. The average total nightly sleep time is 7½ to 8 hours. Healthy adults may need a little more or a little less than this. Why is getting enough sleep important? Getting enough quality sleep is a basic part of good health. When your sleep suffers, your mood and your thoughts can suffer too. You may find yourself feeling more grumpy or stressed. Not getting enough sleep also can lead to serious problems, including injury, accidents, anxiety, and depression. What might cause poor sleeping? Many things can cause sleep problems, including:  · Stress. Stress can be caused by fear about a single event, such as giving a speech. Or you may have ongoing stress, such as worry about work or school. · Depression, anxiety, and other mental or emotional conditions. · Changes in your sleep habits or surroundings. This includes changes that happen where you sleep, such as noise, light, or sleeping in a different bed. It also includes changes in your sleep pattern, such as having jet lag or working a late shift. · Health problems, such as pain, breathing problems, and restless legs syndrome. · Lack of regular exercise. How can you help yourself? Here are some tips that may help you sleep more soundly and wake up feeling more refreshed. Your sleeping area  · Use your bedroom only for sleeping and sex. A bit of light reading may help you fall asleep. But if it doesn't, do your reading elsewhere in the house. Don't watch TV in bed. · Be sure your bed is big enough to stretch out comfortably, especially if you have a sleep partner.   · Keep your bedroom quiet, dark, and cool. Use curtains, blinds, or a sleep mask to block out light. To block out noise, use earplugs, soothing music, or a \"white noise\" machine. Your evening and bedtime routine  · Create a relaxing bedtime routine. You might want to take a warm shower or bath, listen to soothing music, or drink a cup of noncaffeinated tea. · Go to bed at the same time every night. And get up at the same time every morning, even if you feel tired. What to avoid  · Limit caffeine (coffee, tea, caffeinated sodas) during the day, and don't have any for at least 4 to 6 hours before bedtime. · Don't drink alcohol before bedtime. Alcohol can cause you to wake up more often during the night. · Don't smoke or use tobacco, especially in the evening. Nicotine can keep you awake. · Don't take naps during the day, especially close to bedtime. · Don't lie in bed awake for too long. If you can't fall asleep, or if you wake up in the middle of the night and can't get back to sleep within 15 minutes or so, get out of bed and go to another room until you feel sleepy. · Don't take medicine right before bed that may keep you awake or make you feel hyper or energized. Your doctor can tell you if your medicine may do this and if you can take it earlier in the day. If you can't sleep  · Imagine yourself in a peaceful, pleasant scene. Focus on the details and feelings of being in a place that is relaxing. · Get up and do a quiet or boring activity until you feel sleepy. · Don't drink any liquids after 6 p.m. if you wake up often because you have to go to the bathroom. Where can you learn more? Go to http://celia-staci.info/. Enter B478 in the search box to learn more about \"Learning About Sleeping Well. \"  Current as of: December 7, 2017  Content Version: 11.8  © 1894-5641 [employer], [employer]  Care instructions adapted under license by [employer] (which disclaims liability or warranty for this information). If you have questions about a medical condition or this instruction, always ask your healthcare professional. [address] any warranty or liability for your use of this information. Safe Use of Opioid Pain Medicine: Care Instructions  Your Care Instructions  Pain is your body's way of warning you that something is wrong. Pain feels different for everybody. Only you can describe your pain. A doctor can suggest or prescribe many types of medicines for pain. These range from over-the-counter medicines like acetaminophen (Tylenol) to powerful medicines called opioids. Examples of opioids are fentanyl, hydrocodone, morphine, and oxycodone. Heroin is an illegal opioid  Opioids are strong medicines. They can help you manage pain when you use them the right way. But if you misuse them, they can cause serious harm and even death. For these reasons, doctors are very careful about how they prescribe opioids. If you decide to take opioids, here are some things to remember. · Keep your doctor informed. You can get addicted to opioids. The risk is higher if you have a history of substance use. Your doctor will monitor you closely for signs of misuse and addiction and to figure out when you no longer need to take opioids. · Make a treatment plan. The goal of your plan is to be able to function and do the things you need to do, even if you still have some pain. You might be able to manage your pain with other non-opioid options like physical therapy, relaxation, or over-the-counter pain medicines. · Be aware of the side effects. Opioids can cause serious side effects, such as constipation, dry mouth, and nausea. And over time, you may need a higher dose to get pain relief. This is called tolerance. Your body also gets used to opioids. This is called physical dependence. If you suddenly stop taking them, you may have withdrawal symptoms.   The doctor carefully considered what pain medicine is right for you. You may not have received opioids if your doctor was concerned about drug interactions or your safety, or if he or she had other concerns. It is best to have one doctor or clinic treat your pain. This way you will get the pain medicine that will help you the most. And a doctor will be able to watch for any problems that the medicine might cause. The doctor has checked you carefully, but problems can develop later. If you notice any problems or new symptoms,  get medical treatment right away. Follow-up care is a key part of your treatment and safety. Be sure to make and go to all appointments, and call your doctor if you are having problems. It's also a good idea to know your test results and keep a list of the medicines you take. How can you care for yourself at home? · If you need to take opioids to manage your pain, remember these safety tips. ? Follow directions carefully. It's easy to misuse opioids if you take a dose other than what's prescribed by your doctor. This can lead to overdose and even death. Even sharing them with someone they weren't meant for is misuse. ? Be cautious. Opioids may affect your judgment and decision making. Do not drive or operate machinery until you can think clearly. Talk with your doctor about when it is safe to drive. ? Reduce the risk of drug interactions. Opioids can be dangerous if you take them with alcohol or with certain drugs like sleeping pills and muscle relaxers. Make sure your doctor knows about all the other medicines you take, including over-the-counter medicines. Don't start any new medicines before you talk to your doctor or pharmacist.  ? Keep others safe. Store opioids in a safe and secure place. Make sure that pets, children, friends, and family can't get to them. When you're done using opioids, make sure to properly dispose of them.  You can either use a community drug take-back program or your drugstore's mail-back program. If one of these programs isn't available, you can flush opioid skin patches and unused opioid pills down the toilet. ? Reduce the risk of overdose. Misuse of opioids can be very dangerous. Protect yourself by asking your doctor about a naloxone rescue kit. It can help you--and even save your life--if you take too much of an opioid. · Try other ways to reduce pain. ? Relax, and reduce stress. Relaxation techniques such as deep breathing or meditation can help. ? Keep moving. Gentle, daily exercise can help reduce pain over the long run. Try low- or no-impact exercises such as walking, swimming, and stationary biking. Do stretches to stay flexible. ? Try heat, cold packs, and massage. ? Get enough sleep. Pain can make you tired and drain your energy. Talk with your doctor if you have trouble sleeping because of pain. ? Think positive. Your thoughts can affect your pain level. Do things that you enjoy to distract yourself when you have pain instead of focusing on the pain. See a movie, read a book, listen to music, or spend time with a friend. · If you are not taking a prescription pain medicine, ask your doctor if you can take an over-the-counter medicine. When should you call for help? Call your doctor now or seek immediate medical care if:    · You have a new kind of pain.     · You have new symptoms, such as a fever or rash, along with the pain.    Watch closely for changes in your health, and be sure to contact your doctor if:    · You think you might be using too much pain medicine, and you need help to use less or stop.     · Your pain gets worse.     · You would like a referral to a doctor or clinic that specializes in pain management. Where can you learn more? Go to http://celia-staci.info/. Enter R108 in the search box to learn more about \"Safe Use of Opioid Pain Medicine: Care Instructions. \"  Current as of: September 10, 2017  Content Version: 11.8  © 8387-6505 [employer]. Care instructions adapted under license by [employer] (which disclaims liability or warranty for this information). If you have questions about a medical condition or this instruction, always ask your healthcare professional. [address] any warranty or liability for your use of this information.

## 2018-10-19 NOTE — PROGRESS NOTES
Nursing Notes    Patient presents to the office today in follow-up. Patient rates her pain at 8/10 on the numerical pain scale. Reviewed medications with counts as follows:    Rx Date filled Qty Dispensed Pill Count Last Dose Short   Fentanyl 12mcg 9/20/18 10 1 +1 on  yesterday no   Tramadol 50mg 10/15/18 180 166 today no                      PHQ over the last two weeks 10/19/2018   Little interest or pleasure in doing things Not at all   Feeling down, depressed, irritable, or hopeless Not at all   Total Score PHQ 2 0                 Last opioid agreement 12/6/17  Last urine drug screen 6/5/18    Comments:     POC UDS was not performed in office today    Any new labs or imaging since last appointment? NO    Have you been to an emergency room (ER) or urgent care clinic since your last visit? NO            Have you been hospitalized since your last visit? NO     If yes, where, when, and reason for visit? Have you seen or consulted any other health care providers outside of the [address] [place] [address]  since your last visit? YES , RFA done   If yes, where, when, and reason for visit? Ms. Nick Galvan has a reminder for a \"due or due soon\" health maintenance. I have asked that she contact her primary care provider for follow-up on this health maintenance.

## 2018-11-01 ENCOUNTER — DOCUMENTATION ONLY (OUTPATIENT)
Dept: PAIN MANAGEMENT | Age: 63
End: 2018-11-01

## 2018-11-13 ENCOUNTER — OFFICE VISIT (OUTPATIENT)
Dept: FAMILY MEDICINE CLINIC | Age: 63
End: 2018-11-13

## 2018-11-13 VITALS
HEART RATE: 85 BPM | HEIGHT: 62 IN | BODY MASS INDEX: 27.02 KG/M2 | RESPIRATION RATE: 18 BRPM | WEIGHT: 146.8 LBS | SYSTOLIC BLOOD PRESSURE: 131 MMHG | TEMPERATURE: 98.6 F | DIASTOLIC BLOOD PRESSURE: 78 MMHG | OXYGEN SATURATION: 100 %

## 2018-11-13 DIAGNOSIS — J01.40 ACUTE PANSINUSITIS, RECURRENCE NOT SPECIFIED: Primary | ICD-10-CM

## 2018-11-13 RX ORDER — METHYLPREDNISOLONE 4 MG/1
TABLET ORAL
Qty: 1 DOSE PACK | Refills: 0 | Status: SHIPPED | OUTPATIENT
Start: 2018-11-13 | End: 2019-01-15 | Stop reason: ALTCHOICE

## 2018-11-13 RX ORDER — AZITHROMYCIN 250 MG/1
TABLET, FILM COATED ORAL
Qty: 6 TAB | Refills: 0 | Status: SHIPPED | OUTPATIENT
Start: 2018-11-13 | End: 2018-11-18

## 2018-11-13 NOTE — PROGRESS NOTES
HISTORY OF PRESENT ILLNESS  Severiano Jaeger is a 63 y.o. female. URI    The history is provided by the patient and medical records. This is a new problem. Episode onset: 1 week ago. Associated symptoms include sinus pain (frontal) and cough. Pertinent negatives include no chest pain, no nausea, no vomiting and no sore throat. 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, T45.1X5A    Chest wall pain following surgery R07.89, G89.18    Renal insufficiency N28.9    Proteinuria R80.9    Chronic insomnia F51.04    Paroxysmal sleep G47.419    Encounter for long-term (current) use of high-risk medication Z79.899    Foraminal stenosis of lumbar region M99.83    Lumbar facet arthropathy M47.816    Lumbar post-laminectomy syndrome M96.1    Lumbar neuritis M54.16    Spasm of back muscles M62.830    Drug-induced constipation K59.03    Bilateral sacroiliitis (HCC) M46.1    Drug-induced nausea and vomiting R11.2, T50.905A    Irritable bowel syndrome with both constipation and diarrhea K58.2    Fibromyalgia M79.7    Anxiety F41.9    Depression F32.9    Hypersomnolence G47.10    Chronic pain syndrome G89.4    Recurrent depression (HCC) F33.9       Current Outpatient Medications:     pseudoephedrine ER (SUDAFED 24 HOUR) 240 mg Tb24 tablet, Take 240 mg by mouth every twenty-four (24) hours. , Disp: , Rfl:     traMADol (ULTRAM) 50 mg tablet, Take 2 Tabs by mouth every six (6) hours as needed for Pain.  Max Daily Amount: 400 mg., Disp: 240 Tab, Rfl: 0    acetaminophen (TYLENOL EXTRA STRENGTH) 500 mg tablet, Take 2 Tabs by mouth every eight (8) hours for 30 days. , Disp: 180 Tab, Rfl: 2    levothyroxine (SYNTHROID) 100 mcg tablet, TAKE 1 TABLET DAILY BEFORE BREAKFAST, Disp: 90 Tab, Rfl: 1    cholecalciferol, vitamin D3, (VITAMIN D3) 2,000 unit tab, Take  by mouth., Disp: , Rfl:     methocarbamol (ROBAXIN) 750 mg tablet, TAKE 1 TAB BY MOUTH THREE (3) TIMES DAILY. AS NEEDED FOR MUSCLE SPASMS, Disp: 90 Tab, Rfl: 2    fluticasone (FLONASE) 50 mcg/actuation nasal spray, 2 Sprays by Both Nostrils route daily. , Disp: 1 Bottle, Rfl: 0    ondansetron (ZOFRAN ODT) 8 mg disintegrating tablet, Take 1 Tab by mouth every eight (8) hours as needed for Nausea., Disp: 30 Tab, Rfl: 2    Omega-3-DHA-EPA-Fish Oil 1,000 mg (120 mg-180 mg) cap, Take  by mouth., Disp: , Rfl:     ranitidine (ZANTAC) 150 mg tablet, Take 150 mg by mouth daily as needed. , Disp: , Rfl:     loratadine (CLARITIN) 10 mg tablet, Take 10 mg by mouth daily as needed. , Disp: , Rfl:     Allergies   Allergen Reactions    Adhesive Rash    Aspirin Other (comments) and Nausea and Vomiting     G6PD  G6PD  GI BLEED AND ULCER    Contrast Agent [Iodine] Rash     Allergic to CT Dye    Dilantin [Phenytoin Sodium Extended] Other (comments)     Difficulty waking    Iodinated Contrast- Oral And Iv Dye Rash     \"bumps on face\"    Lipitor [Atorvastatin] Other (comments)     PKU     Pcn [Penicillins] Rash and Hives     \"sores all over the body\"    Phenytoin Unknown (comments)     \"Trouble waking up\"    Sulfa (Sulfonamide Antibiotics) Rash and Nausea and Vomiting     G6PD  G6PD    Tegretol [Carbamazepine] Other (comments) and Vertigo     \"Trouble waking up\"  Difficulty waking up         Review of Systems   Constitutional: Positive for chills. Negative for fever. HENT: Positive for sinus pain (frontal). Negative for sore throat.          Heavy post nasal drainage, \"head pressure\"   Respiratory: Positive for cough and sputum production (sometimes yellow). Cardiovascular: Negative for chest pain and palpitations. Gastrointestinal: Negative for nausea and vomiting. Visit Vitals  /78 (BP 1 Location: Left arm, BP Patient Position: Sitting)   Pulse 85   Temp 98.6 °F (37 °C) (Oral)   Resp 18   Ht 5' 2\" (1.575 m)   Wt 146 lb 12.8 oz (66.6 kg)   LMP 08/28/2002   SpO2 100%   BMI 26.85 kg/m²       Physical Exam   Constitutional: She is oriented to person, place, and time. She appears well-developed and well-nourished. HENT:   Head: Normocephalic. Right Ear: Tympanic membrane and ear canal normal.   Left Ear: Tympanic membrane and ear canal normal.   Nose: Right sinus exhibits no maxillary sinus tenderness and no frontal sinus tenderness. Left sinus exhibits maxillary sinus tenderness. Left sinus exhibits no frontal sinus tenderness. Mouth/Throat: Oropharynx is clear and moist.   Eyes: Conjunctivae and EOM are normal.   Neck: Neck supple. Cardiovascular: Normal rate, regular rhythm and normal heart sounds. Pulmonary/Chest: Effort normal and breath sounds normal.   Lymphadenopathy:     She has no cervical adenopathy. Neurological: She is alert and oriented to person, place, and time. Skin: Skin is warm and dry. Psychiatric: She has a normal mood and affect. Her behavior is normal.   Nursing note and vitals reviewed. ASSESSMENT and PLAN    ICD-10-CM ICD-9-CM    1. Acute pansinusitis, recurrence not specified J01.40 461.8 azithromycin (ZITHROMAX) 250 mg tablet      methylPREDNISolone (MEDROL DOSEPACK) 4 mg tablet   Follow dose pack instructions  Complete prescribed course of antibiotics. Follow up for new symptoms, worsening symptoms or failure to improve.

## 2018-11-13 NOTE — PROGRESS NOTES
Regis Cavanaugh is a 63 y.o. female (: 1955) presenting to address:    Chief Complaint   Patient presents with    URI     Here for URI x 1 week. There were no vitals filed for this visit. Hearing/Vision:   No exam data present    Learning Assessment:     Learning Assessment 10/9/2017   PRIMARY LEARNER Patient   HIGHEST LEVEL OF EDUCATION - PRIMARY LEARNER  -   BARRIERS PRIMARY LEARNER -   CO-LEARNER CAREGIVER -   PRIMARY LANGUAGE ENGLISH   LEARNER PREFERENCE PRIMARY READING     LISTENING     PICTURES     VIDEOS     DEMONSTRATION   ANSWERED BY self   RELATIONSHIP SELF     Depression Screening:     PHQ over the last two weeks 10/19/2018   Little interest or pleasure in doing things Not at all   Feeling down, depressed, irritable, or hopeless Not at all   Total Score PHQ 2 0     Fall Risk Assessment:     Fall Risk Assessment, last 12 mths 10/19/2018   Able to walk? Yes   Fall in past 12 months? No   Fall with injury? -   Number of falls in past 12 months -   Fall Risk Score -     Abuse Screening:     Abuse Screening Questionnaire 10/19/2018   Do you ever feel afraid of your partner? N   Are you in a relationship with someone who physically or mentally threatens you? N   Is it safe for you to go home? Y     Coordination of Care Questionaire:   1. Have you been to the ER, urgent care clinic since your last visit? Hospitalized since your last visit? NO    2. Have you seen or consulted any other health care providers outside of the [address] [place] [address] since your last visit? Include any pap smears or colon screening. YES    Advanced Directive:   1. Do you have an Advanced Directive? NO    2. Would you like information on Advanced Directives?  NO

## 2018-11-13 NOTE — PATIENT INSTRUCTIONS
Follow dose pack instructions  Complete prescribed course of antibiotics. Follow up for new symptoms, worsening symptoms or failure to improve. Sinusitis: Care Instructions  Your Care Instructions    Sinusitis is an infection of the lining of the sinus cavities in your head. Sinusitis often follows a cold. It causes pain and pressure in your head and face. In most cases, sinusitis gets better on its own in 1 to 2 weeks. But some mild symptoms may last for several weeks. Sometimes antibiotics are needed. Follow-up care is a key part of your treatment and safety. Be sure to make and go to all appointments, and call your doctor if you are having problems. It's also a good idea to know your test results and keep a list of the medicines you take. How can you care for yourself at home? · Take an over-the-counter pain medicine, such as acetaminophen (Tylenol), ibuprofen (Advil, Motrin), or naproxen (Aleve). Read and follow all instructions on the label. · If the doctor prescribed antibiotics, take them as directed. Do not stop taking them just because you feel better. You need to take the full course of antibiotics. · Be careful when taking over-the-counter cold or flu medicines and Tylenol at the same time. Many of these medicines have acetaminophen, which is Tylenol. Read the labels to make sure that you are not taking more than the recommended dose. Too much acetaminophen (Tylenol) can be harmful. · Breathe warm, moist air from a steamy shower, a hot bath, or a sink filled with hot water. Avoid cold, dry air. Using a humidifier in your home may help. Follow the directions for cleaning the machine. · Use saline (saltwater) nasal washes to help keep your nasal passages open and wash out mucus and bacteria. You can buy saline nose drops at a grocery store or drugstore. Or you can make your own at home by adding 1 teaspoon of salt and 1 teaspoon of baking soda to 2 cups of distilled water.  If you make your own, fill a bulb syringe with the solution, insert the tip into your nostril, and squeeze gently. Rainbow Drain your nose. · Put a hot, wet towel or a warm gel pack on your face 3 or 4 times a day for 5 to 10 minutes each time. · Try a decongestant nasal spray like oxymetazoline (Afrin). Do not use it for more than 3 days in a row. Using it for more than 3 days can make your congestion worse. When should you call for help? Call your doctor now or seek immediate medical care if:    · You have new or worse swelling or redness in your face or around your eyes.     · You have a new or higher fever.    Watch closely for changes in your health, and be sure to contact your doctor if:    · You have new or worse facial pain.     · The mucus from your nose becomes thicker (like pus) or has new blood in it.     · You are not getting better as expected. Where can you learn more? Go to http://celia-staci.info/. Enter E680 in the search box to learn more about \"Sinusitis: Care Instructions. \"  Current as of: March 28, 2018  Content Version: 11.8  © 3431-9511 [employer]. Care instructions adapted under license by [employer] (which disclaims liability or warranty for this information). If you have questions about a medical condition or this instruction, always ask your healthcare professional. [address] any warranty or liability for your use of this information.

## 2018-11-15 ENCOUNTER — TELEPHONE (OUTPATIENT)
Dept: PAIN MANAGEMENT | Age: 63
End: 2018-11-15

## 2018-11-15 NOTE — TELEPHONE ENCOUNTER
Patient called the nurse triage line requesting refill of their Tramadol. I called the patient back, Patient identified using two patient identifiers (name and ). I told the patient that they should already have the prescription, since according to EDIN Joseph's notes, she provided the patient with the prescription on 10/19/18. Patient verbalized understanding and stated that she forgot about that. No further questions by the patient at this time.

## 2018-11-20 ENCOUNTER — OFFICE VISIT (OUTPATIENT)
Dept: FAMILY MEDICINE CLINIC | Age: 63
End: 2018-11-20

## 2018-11-20 VITALS
BODY MASS INDEX: 26.83 KG/M2 | RESPIRATION RATE: 17 BRPM | OXYGEN SATURATION: 97 % | HEART RATE: 80 BPM | HEIGHT: 62 IN | WEIGHT: 145.8 LBS | TEMPERATURE: 98.7 F | DIASTOLIC BLOOD PRESSURE: 90 MMHG | SYSTOLIC BLOOD PRESSURE: 132 MMHG

## 2018-11-20 DIAGNOSIS — J10.1 INFLUENZA B: Primary | ICD-10-CM

## 2018-11-20 DIAGNOSIS — R68.83 CHILLS (WITHOUT FEVER): ICD-10-CM

## 2018-11-20 NOTE — PATIENT INSTRUCTIONS
Influenza (Flu): Care Instructions  Your Care Instructions    Influenza (flu) is an infection in the lungs and breathing passages. It is caused by the influenza virus. There are different strains, or types, of the flu virus from year to year. Unlike the common cold, the flu comes on suddenly and the symptoms, such as a cough, congestion, fever, chills, fatigue, aches, and pains, are more severe. These symptoms may last up to 10 days. Although the flu can make you feel very sick, it usually doesn't cause serious health problems. Home treatment is usually all you need for flu symptoms. But your doctor may prescribe antiviral medicine to prevent other health problems, such as pneumonia, from developing. Older people and those who have a long-term health condition, such as lung disease, are most at risk for having pneumonia or other health problems. Follow-up care is a key part of your treatment and safety. Be sure to make and go to all appointments, and call your doctor if you are having problems. It's also a good idea to know your test results and keep a list of the medicines you take. How can you care for yourself at home? · Get plenty of rest.  · Drink plenty of fluids, enough so that your urine is light yellow or clear like water. If you have kidney, heart, or liver disease and have to limit fluids, talk with your doctor before you increase the amount of fluids you drink. · Take an over-the-counter pain medicine if needed, such as acetaminophen (Tylenol), ibuprofen (Advil, Motrin), or naproxen (Aleve), to relieve fever, headache, and muscle aches. Read and follow all instructions on the label. No one younger than 20 should take aspirin. It has been linked to Reye syndrome, a serious illness. · Do not smoke. Smoking can make the flu worse. If you need help quitting, talk to your doctor about stop-smoking programs and medicines. These can increase your chances of quitting for good.   · Breathe moist air from a hot shower or from a sink filled with hot water to help clear a stuffy nose. · Before you use cough and cold medicines, check the label. These medicines may not be safe for young children or for people with certain health problems. · If the skin around your nose and lips becomes sore, put some petroleum jelly on the area. · To ease coughing:  ? Drink fluids to soothe a scratchy throat. ? Suck on cough drops or plain hard candy. ? Take an over-the-counter cough medicine that contains dextromethorphan to help you get some sleep. Read and follow all instructions on the label. ? Raise your head at night with an extra pillow. This may help you rest if coughing keeps you awake. · Take any prescribed medicine exactly as directed. Call your doctor if you think you are having a problem with your medicine. To avoid spreading the flu  · Wash your hands regularly, and keep your hands away from your face. · Stay home from school, work, and other public places until you are feeling better and your fever has been gone for at least 24 hours. The fever needs to have gone away on its own without the help of medicine. · Ask people living with you to talk to their doctors about preventing the flu. They may get antiviral medicine to keep from getting the flu from you. · To prevent the flu in the future, get a flu vaccine every fall. Encourage people living with you to get the vaccine. · Cover your mouth when you cough or sneeze. When should you call for help? Call 911 anytime you think you may need emergency care.  For example, call if:    · You have severe trouble breathing.    Call your doctor now or seek immediate medical care if:    · You have new or worse trouble breathing.     · You seem to be getting much sicker.     · You feel very sleepy or confused.     · You have a new or higher fever.     · You get a new rash.    Watch closely for changes in your health, and be sure to contact your doctor if:    · You begin to get better and then get worse.     · You are not getting better after 1 week. Where can you learn more? Go to http://celia-staci.info/. Enter B741 in the search box to learn more about \"Influenza (Flu): Care Instructions. \"  Current as of: December 6, 2017  Content Version: 11.8  © 1121-4630 [employer]. Care instructions adapted under license by [employer] (which disclaims liability or warranty for this information). If you have questions about a medical condition or this instruction, always ask your healthcare professional. [place] [address] any warranty or liability for your use of this information.

## 2018-11-20 NOTE — PROGRESS NOTES
Assessment/Plan:    *Diagnoses and all orders for this visit:    1. Chills (without fever)  -     AMB POC CHRISTEL INFLUENZA A/B TEST      Symptoms have already been x 5 days so Tx will be offered as it is past the window of time for this. Rest, push fluids, try to eat to build up strength. The plan was discussed with the patient. The patient verbalized understanding and is in agreement with the plan. All medication potential side effects were discussed with the patient.    -------------------------------------------------------------------------------------------------------------------        Susan George is a 63 y.o. female and presents with Sinus Pain and Headache         Subjective:  Pt here to f/u after her visit with Dr. Marcelino Zimmer last week. Was treated for sinusitis with Z-pack and Medrol dose pack. She stopped medrol after 3 days, could not tolerate it. Since completing the Abx, she has developed a terrible cough, with chills and body aches. Poor appetite. She had the flu last year. ROS:  Review of Systems - Negative except as above        The problem list was updated as a part of today's visit.   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, T45.1X5A    Chest wall pain following surgery R07.89, G89.18    Renal insufficiency N28.9    Proteinuria R80.9    Chronic insomnia F51.04    Paroxysmal sleep G47.419    Encounter for long-term (current) use of high-risk medication Z79.899    Foraminal stenosis of lumbar region M99.83    Lumbar facet arthropathy M47.816    Lumbar post-laminectomy syndrome M96.1    Lumbar neuritis M54.16    Spasm of back muscles M62.830    Drug-induced constipation K59.03    Bilateral sacroiliitis (HCC) M46.1    Drug-induced nausea and vomiting R11.2, T50.905A    Irritable bowel syndrome with both constipation and diarrhea K58.2    Fibromyalgia M79.7    Anxiety F41.9    Depression F32.9    Hypersomnolence G47.10    Chronic pain syndrome G89.4    Recurrent depression (HCC) F33.9       The PSH, FH were reviewed. SH:  Social History     Tobacco Use    Smoking status: Never Smoker    Smokeless tobacco: Never Used   Substance Use Topics    Alcohol use: No    Drug use: No       Medications/Allergies:  Current Outpatient Medications on File Prior to Visit   Medication Sig Dispense Refill    traMADol (ULTRAM) 50 mg tablet Take 2 Tabs by mouth every six (6) hours as needed for Pain. Max Daily Amount: 400 mg. 240 Tab 0    levothyroxine (SYNTHROID) 100 mcg tablet TAKE 1 TABLET DAILY BEFORE BREAKFAST 90 Tab 1    cholecalciferol, vitamin D3, (VITAMIN D3) 2,000 unit tab Take  by mouth.  methocarbamol (ROBAXIN) 750 mg tablet TAKE 1 TAB BY MOUTH THREE (3) TIMES DAILY. AS NEEDED FOR MUSCLE SPASMS 90 Tab 2    fluticasone (FLONASE) 50 mcg/actuation nasal spray 2 Sprays by Both Nostrils route daily. 1 Bottle 0    ondansetron (ZOFRAN ODT) 8 mg disintegrating tablet Take 1 Tab by mouth every eight (8) hours as needed for Nausea. 30 Tab 2    Omega-3-DHA-EPA-Fish Oil 1,000 mg (120 mg-180 mg) cap Take  by mouth.  ranitidine (ZANTAC) 150 mg tablet Take 150 mg by mouth daily as needed.  loratadine (CLARITIN) 10 mg tablet Take 10 mg by mouth daily as needed.  pseudoephedrine ER (SUDAFED 24 HOUR) 240 mg Tb24 tablet Take 240 mg by mouth every twenty-four (24) hours.       methylPREDNISolone (MEDROL DOSEPACK) 4 mg tablet Follow dose pack instructions 1 Dose Pack 0     No current facility-administered medications on file prior to visit. Allergies   Allergen Reactions    Adhesive Rash    Aspirin Other (comments) and Nausea and Vomiting     G6PD  G6PD  GI BLEED AND ULCER    Contrast Agent [Iodine] Rash     Allergic to CT Dye    Dilantin [Phenytoin Sodium Extended] Other (comments)     Difficulty waking    Iodinated Contrast- Oral And Iv Dye Rash     \"bumps on face\"    Lipitor [Atorvastatin] Other (comments)     PKU     Pcn [Penicillins] Rash and Hives     \"sores all over the body\"    Phenytoin Unknown (comments)     \"Trouble waking up\"    Sulfa (Sulfonamide Antibiotics) Rash and Nausea and Vomiting     G6PD  G6PD    Tegretol [Carbamazepine] Other (comments) and Vertigo     \"Trouble waking up\"  Difficulty waking up         Health Maintenance:   Health Maintenance   Topic Date Due    Pneumococcal 19-64 Highest Risk (1 of 3 - PCV13) 12/23/1976    Shingrix Vaccine Age 50> (1 of 2) 12/23/2007    MEDICARE YEARLY EXAM  10/19/2018    PAP AKA CERVICAL CYTOLOGY  12/13/2019    BREAST CANCER SCRN MAMMOGRAM  02/28/2020    COLONOSCOPY  06/22/2021    DTaP/Tdap/Td series (2 - Td) 04/01/2025    Hepatitis C Screening  Completed    Influenza Age 5 to Adult  Completed       Objective:  Visit Vitals  /90 (BP 1 Location: Left arm, BP Patient Position: Sitting)   Pulse 80   Temp 98.7 °F (37.1 °C) (Oral)   Resp 17   Ht 5' 2\" (1.575 m)   Wt 145 lb 12.8 oz (66.1 kg)   LMP 08/28/2002   SpO2 97%   BMI 26.67 kg/m²          Nurses notes and VS reviewed.       Physical Examination: General appearance - ill-appearing  Ears - bilateral TM's and external ear canals normal  Mouth - erythematous  Neck - supple, no significant adenopathy  Chest - rhonchi noted scattered  Heart - normal rate and regular rhythm          Labwork and Ancillary Studies:    CBC w/Diff  Lab Results   Component Value Date/Time    WBC 6.5 05/15/2018 12:00 AM    HGB 11.1 05/15/2018 12:00 AM    PLATELET 785 05/15/2018 12:00 AM         Basic Metabolic Profile  Lab Results   Component Value Date/Time    Sodium 144 05/15/2018 12:00 AM    Potassium 4.4 05/15/2018 12:00 AM    Chloride 100 05/15/2018 12:00 AM    CO2 22 05/15/2018 12:00 AM    Anion gap 8 11/24/2015 10:47 AM    Glucose 58 (L) 05/15/2018 12:00 AM    BUN 16 05/15/2018 12:00 AM    Creatinine 0.80 05/15/2018 12:00 AM    BUN/Creatinine ratio 20 05/15/2018 12:00 AM    GFR est AA 93 05/15/2018 12:00 AM    GFR est non-AA 80 05/15/2018 12:00 AM    Calcium 9.0 05/15/2018 12:00 AM         LFT  Lab Results   Component Value Date/Time    ALT (SGPT) 10 05/15/2018 12:00 AM    AST (SGOT) 18 05/15/2018 12:00 AM    Alk.  phosphatase 60 05/15/2018 12:00 AM    Bilirubin, direct 0.06 05/15/2018 12:00 AM    Bilirubin, total 0.2 05/15/2018 12:00 AM         Cholesterol  Lab Results   Component Value Date/Time    Cholesterol, total 240 (H) 05/15/2018 12:00 AM    HDL Cholesterol 50 05/15/2018 12:00 AM    LDL, calculated 152 (H) 05/15/2018 12:00 AM    Triglyceride 188 (H) 05/15/2018 12:00 AM    CHOL/HDL Ratio 5.9 (H) 11/24/2015 10:47 AM

## 2018-11-20 NOTE — PROGRESS NOTES
Brianna Payton is a 63 y.o. female (: 1955) presenting to address:    Chief Complaint   Patient presents with    Sinus Pain    Headache       Vitals:    18 0929   BP: 132/90   Pulse: 80   Resp: 17   Temp: 98.7 °F (37.1 °C)   TempSrc: Oral   SpO2: 97%   Weight: 145 lb 12.8 oz (66.1 kg)   Height: 5' 2\" (1.575 m)   LMP: 2002       Hearing/Vision:   No exam data present    Learning Assessment:     Learning Assessment 10/9/2017   PRIMARY LEARNER Patient   HIGHEST LEVEL OF EDUCATION - PRIMARY LEARNER  -   BARRIERS PRIMARY LEARNER -   CO-LEARNER CAREGIVER -   PRIMARY LANGUAGE ENGLISH   LEARNER PREFERENCE PRIMARY READING     LISTENING     PICTURES     VIDEOS     DEMONSTRATION   ANSWERED BY self   RELATIONSHIP SELF     Depression Screening:     PHQ over the last two weeks 10/19/2018   Little interest or pleasure in doing things Not at all   Feeling down, depressed, irritable, or hopeless Not at all   Total Score PHQ 2 0     Fall Risk Assessment:     Fall Risk Assessment, last 12 mths 10/19/2018   Able to walk? Yes   Fall in past 12 months? No   Fall with injury? -   Number of falls in past 12 months -   Fall Risk Score -     Abuse Screening:     Abuse Screening Questionnaire 10/19/2018   Do you ever feel afraid of your partner? N   Are you in a relationship with someone who physically or mentally threatens you? N   Is it safe for you to go home? Y     Coordination of Care Questionaire:   1. Have you been to the ER, urgent care clinic since your last visit? Hospitalized since your last visit? No     2. Have you seen or consulted any other health care providers outside of the [address] [place] [address] since your last visit? Include any pap smears or colon screening. No     Advanced Directive:   1. Do you have an Advanced Directive? Yes     2. Would you like information on Advanced Directives?   No       Health Maintenance Due   Topic Date Due    Pneumococcal 19-64 Highest Risk (1 of 3 - PCV13) 12/23/1976    Shingrix Vaccine Age 50> (1 of 2) 12/23/2007    MEDICARE YEARLY EXAM  10/19/2018

## 2018-11-27 ENCOUNTER — OFFICE VISIT (OUTPATIENT)
Dept: FAMILY MEDICINE CLINIC | Age: 63
End: 2018-11-27

## 2018-11-27 ENCOUNTER — HOSPITAL ENCOUNTER (OUTPATIENT)
Dept: LAB | Age: 63
Discharge: HOME | End: 2018-11-27
Payer: MEDICARE

## 2018-11-27 VITALS
RESPIRATION RATE: 16 BRPM | SYSTOLIC BLOOD PRESSURE: 124 MMHG | BODY MASS INDEX: 26.87 KG/M2 | OXYGEN SATURATION: 98 % | DIASTOLIC BLOOD PRESSURE: 84 MMHG | HEIGHT: 62 IN | WEIGHT: 146 LBS | TEMPERATURE: 98.8 F | HEART RATE: 94 BPM

## 2018-11-27 DIAGNOSIS — R30.0 DYSURIA: ICD-10-CM

## 2018-11-27 DIAGNOSIS — N30.00 ACUTE CYSTITIS WITHOUT HEMATURIA: Primary | ICD-10-CM

## 2018-11-27 DIAGNOSIS — N30.00 ACUTE CYSTITIS WITHOUT HEMATURIA: ICD-10-CM

## 2018-11-27 LAB
BILIRUB UR QL STRIP: NEGATIVE
GLUCOSE UR-MCNC: NEGATIVE MG/DL
KETONES P FAST UR STRIP-MCNC: NEGATIVE MG/DL
PH UR STRIP: 5.5 [PH] (ref 4.6–8)
PROT UR QL STRIP: NEGATIVE
SP GR UR STRIP: 1.02 (ref 1–1.03)
UA UROBILINOGEN AMB POC: NORMAL (ref 0.2–1)
URINALYSIS CLARITY POC: NORMAL
URINALYSIS COLOR POC: YELLOW
URINE BLOOD POC: NEGATIVE
URINE LEUKOCYTES POC: NEGATIVE
URINE NITRITES POC: NEGATIVE

## 2018-11-27 PROCEDURE — 87086 URINE CULTURE/COLONY COUNT: CPT

## 2018-11-27 RX ORDER — CIPROFLOXACIN 500 MG/1
500 TABLET ORAL 2 TIMES DAILY
Qty: 14 TAB | Refills: 0 | Status: SHIPPED | OUTPATIENT
Start: 2018-11-27 | End: 2018-12-04

## 2018-11-27 NOTE — PATIENT INSTRUCTIONS
Urinary Tract Infection in Women: Care Instructions  Your Care Instructions    A urinary tract infection, or UTI, is a general term for an infection anywhere between the kidneys and the urethra (where urine comes out). Most UTIs are bladder infections. They often cause pain or burning when you urinate. UTIs are caused by bacteria and can be cured with antibiotics. Be sure to complete your treatment so that the infection goes away. Follow-up care is a key part of your treatment and safety. Be sure to make and go to all appointments, and call your doctor if you are having problems. It's also a good idea to know your test results and keep a list of the medicines you take. How can you care for yourself at home? · Take your antibiotics as directed. Do not stop taking them just because you feel better. You need to take the full course of antibiotics. · Drink extra water and other fluids for the next day or two. This may help wash out the bacteria that are causing the infection. (If you have kidney, heart, or liver disease and have to limit fluids, talk with your doctor before you increase your fluid intake.)  · Avoid drinks that are carbonated or have caffeine. They can irritate the bladder. · Urinate often. Try to empty your bladder each time. · To relieve pain, take a hot bath or lay a heating pad set on low over your lower belly or genital area. Never go to sleep with a heating pad in place. To prevent UTIs  · Drink plenty of water each day. This helps you urinate often, which clears bacteria from your system. (If you have kidney, heart, or liver disease and have to limit fluids, talk with your doctor before you increase your fluid intake.)  · Urinate when you need to. · Urinate right after you have sex. · Change sanitary pads often. · Avoid douches, bubble baths, feminine hygiene sprays, and other feminine hygiene products that have deodorants.   · After going to the bathroom, wipe from front to back.  When should you call for help? Call your doctor now or seek immediate medical care if:    · Symptoms such as fever, chills, nausea, or vomiting get worse or appear for the first time.     · You have new pain in your back just below your rib cage. This is called flank pain.     · There is new blood or pus in your urine.     · You have any problems with your antibiotic medicine.    Watch closely for changes in your health, and be sure to contact your doctor if:    · You are not getting better after taking an antibiotic for 2 days.     · Your symptoms go away but then come back. Where can you learn more? Go to http://celia-staci.info/. Enter K211 in the search box to learn more about \"Urinary Tract Infection in Women: Care Instructions. \"  Current as of: March 21, 2018  Content Version: 11.8  © 5280-4663 [employer]. Care instructions adapted under license by [employer] (which disclaims liability or warranty for this information). If you have questions about a medical condition or this instruction, always ask your healthcare professional. [address] any warranty or liability for your use of this information.

## 2018-11-27 NOTE — PROGRESS NOTES
Assessment/Plan:    *Diagnoses and all orders for this visit:    1. Acute cystitis without hematuria  -     CULTURE, URINE; Future  -     ciprofloxacin HCl (CIPRO) 500 mg tablet; Take 1 Tab by mouth two (2) times a day for 7 days. 2. Dysuria  -     CULTURE, URINE; Future  -     AMB POC URINALYSIS DIP STICK AUTO W/O MICRO        The plan was discussed with the patient. The patient verbalized understanding and is in agreement with the plan. All medication potential side effects were discussed with the patient.    -------------------------------------------------------------------------------------------------------------------        Rose Gonzalez is a 63 y.o. female and presents with Dysuria         Subjective: For 3 days, having urethral irritation, frequency of urination, lower pelvic pain pressure, cloudy, +chills. ROS:  Review of Systems - Negative except as above        The problem list was updated as a part of today's visit.   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, T45.1X5A    Chest wall pain following surgery R07.89, G89.18    Renal insufficiency N28.9    Proteinuria R80.9    Chronic insomnia F51.04    Paroxysmal sleep G47.419    Encounter for long-term (current) use of high-risk medication Z79.899    Foraminal stenosis of lumbar region M99.83    Lumbar facet arthropathy M47.816    Lumbar post-laminectomy syndrome M96.1    Lumbar neuritis M54.16    Spasm of back muscles M62.830    Drug-induced constipation K59.03    Bilateral sacroiliitis (HCC) M46.1    Drug-induced nausea and vomiting R11.2, T50.905A    Irritable bowel syndrome with both constipation and diarrhea K58.2    Fibromyalgia M79.7    Anxiety F41.9    Depression F32.9    Hypersomnolence G47.10    Chronic pain syndrome G89.4    Recurrent depression (HCC) F33.9       The PSH, FH were reviewed. SH:  Social History     Tobacco Use    Smoking status: Never Smoker    Smokeless tobacco: Never Used   Substance Use Topics    Alcohol use: No    Drug use: No       Medications/Allergies:  Current Outpatient Medications on File Prior to Visit   Medication Sig Dispense Refill    pseudoephedrine ER (SUDAFED 24 HOUR) 240 mg Tb24 tablet Take 240 mg by mouth every twenty-four (24) hours.  traMADol (ULTRAM) 50 mg tablet Take 2 Tabs by mouth every six (6) hours as needed for Pain. Max Daily Amount: 400 mg. 240 Tab 0    levothyroxine (SYNTHROID) 100 mcg tablet TAKE 1 TABLET DAILY BEFORE BREAKFAST 90 Tab 1    cholecalciferol, vitamin D3, (VITAMIN D3) 2,000 unit tab Take  by mouth.  methocarbamol (ROBAXIN) 750 mg tablet TAKE 1 TAB BY MOUTH THREE (3) TIMES DAILY. AS NEEDED FOR MUSCLE SPASMS 90 Tab 2    fluticasone (FLONASE) 50 mcg/actuation nasal spray 2 Sprays by Both Nostrils route daily. 1 Bottle 0    ondansetron (ZOFRAN ODT) 8 mg disintegrating tablet Take 1 Tab by mouth every eight (8) hours as needed for Nausea. 30 Tab 2    Omega-3-DHA-EPA-Fish Oil 1,000 mg (120 mg-180 mg) cap Take  by mouth.  ranitidine (ZANTAC) 150 mg tablet Take 150 mg by mouth daily as needed.  loratadine (CLARITIN) 10 mg tablet Take 10 mg by mouth daily as needed.  methylPREDNISolone (MEDROL DOSEPACK) 4 mg tablet Follow dose pack instructions 1 Dose Pack 0     No current facility-administered medications on file prior to visit.          Allergies   Allergen Reactions    Adhesive Rash    Aspirin Other (comments) and Nausea and Vomiting     G6PD  G6PD  GI BLEED AND ULCER    Contrast Agent [Iodine] Rash     Allergic to CT Dye    Dilantin [Phenytoin Sodium Extended] Other (comments)     Difficulty waking    Iodinated Contrast- Oral And Iv Dye Rash     \"bumps on face\"    Lipitor [Atorvastatin] Other (comments)     PKU     Pcn [Penicillins] Rash and Hives     \"sores all over the body\"    Phenytoin Unknown (comments)     \"Trouble waking up\"    Sulfa (Sulfonamide Antibiotics) Rash and Nausea and Vomiting     G6PD  G6PD    Tegretol [Carbamazepine] Other (comments) and Vertigo     \"Trouble waking up\"  Difficulty waking up         Health Maintenance:   Health Maintenance   Topic Date Due    Pneumococcal 19-64 Highest Risk (1 of 3 - PCV13) 12/23/1976    Shingrix Vaccine Age 50> (1 of 2) 12/23/2007    MEDICARE YEARLY EXAM  10/19/2018    PAP AKA CERVICAL CYTOLOGY  12/13/2019    BREAST CANCER SCRN MAMMOGRAM  02/28/2020    COLONOSCOPY  06/22/2021    DTaP/Tdap/Td series (2 - Td) 04/01/2025    Hepatitis C Screening  Completed    Influenza Age 5 to Adult  Completed       Objective:  Visit Vitals  /84 (BP 1 Location: Left arm, BP Patient Position: Sitting)   Pulse 94   Temp 98.8 °F (37.1 °C) (Oral)   Resp 16   Ht 5' 2\" (1.575 m)   Wt 146 lb (66.2 kg)   LMP 08/28/2002   SpO2 98%   BMI 26.70 kg/m²          Nurses notes and VS reviewed.       Physical Examination: General appearance - alert, well appearing, and in no distress  Abdomen - tenderness noted suprapubic region          Labwork and Ancillary Studies:    CBC w/Diff  Lab Results   Component Value Date/Time    WBC 6.5 05/15/2018 12:00 AM    HGB 11.1 05/15/2018 12:00 AM    PLATELET 213 30/10/5773 12:00 AM         Basic Metabolic Profile  Lab Results   Component Value Date/Time    Sodium 144 05/15/2018 12:00 AM    Potassium 4.4 05/15/2018 12:00 AM    Chloride 100 05/15/2018 12:00 AM    CO2 22 05/15/2018 12:00 AM    Anion gap 8 11/24/2015 10:47 AM    Glucose 58 (L) 05/15/2018 12:00 AM BUN 16 05/15/2018 12:00 AM    Creatinine 0.80 05/15/2018 12:00 AM    BUN/Creatinine ratio 20 05/15/2018 12:00 AM    GFR est AA 93 05/15/2018 12:00 AM    GFR est non-AA 80 05/15/2018 12:00 AM    Calcium 9.0 05/15/2018 12:00 AM         LFT  Lab Results   Component Value Date/Time    ALT (SGPT) 10 05/15/2018 12:00 AM    AST (SGOT) 18 05/15/2018 12:00 AM    Alk.  phosphatase 60 05/15/2018 12:00 AM    Bilirubin, direct 0.06 05/15/2018 12:00 AM    Bilirubin, total 0.2 05/15/2018 12:00 AM         Cholesterol  Lab Results   Component Value Date/Time    Cholesterol, total 240 (H) 05/15/2018 12:00 AM    HDL Cholesterol 50 05/15/2018 12:00 AM    LDL, calculated 152 (H) 05/15/2018 12:00 AM    Triglyceride 188 (H) 05/15/2018 12:00 AM    CHOL/HDL Ratio 5.9 (H) 11/24/2015 10:47 AM

## 2018-11-27 NOTE — PROGRESS NOTES
Regis Cavanaugh is a 63 y.o. female (: 1955) presenting to address:    Chief Complaint   Patient presents with    Dysuria       Vitals:    18 1406   BP: 124/84   Pulse: 94   Resp: 16   Temp: 98.8 °F (37.1 °C)   TempSrc: Oral   SpO2: 98%   Weight: 146 lb (66.2 kg)   Height: 5' 2\" (1.575 m)   PainSc:   7   PainLoc: Pelvic   LMP: 2002       Hearing/Vision:   No exam data present    Learning Assessment:     Learning Assessment 10/9/2017   PRIMARY LEARNER Patient   HIGHEST LEVEL OF EDUCATION - PRIMARY LEARNER  -   BARRIERS PRIMARY LEARNER -   CO-LEARNER CAREGIVER -   PRIMARY LANGUAGE ENGLISH   LEARNER PREFERENCE PRIMARY READING     LISTENING     PICTURES     VIDEOS     DEMONSTRATION   ANSWERED BY self   RELATIONSHIP SELF     Depression Screening:     PHQ over the last two weeks 10/19/2018   Little interest or pleasure in doing things Not at all   Feeling down, depressed, irritable, or hopeless Not at all   Total Score PHQ 2 0     Fall Risk Assessment:     Fall Risk Assessment, last 12 mths 10/19/2018   Able to walk? Yes   Fall in past 12 months? No   Fall with injury? -   Number of falls in past 12 months -   Fall Risk Score -     Abuse Screening:     Abuse Screening Questionnaire 10/19/2018   Do you ever feel afraid of your partner? N   Are you in a relationship with someone who physically or mentally threatens you? N   Is it safe for you to go home? Y     Coordination of Care Questionaire:   1. Have you been to the ER, urgent care clinic since your last visit? Hospitalized since your last visit? NO    2. Have you seen or consulted any other health care providers outside of the [address] [place] [address] since your last visit? Include any pap smears or colon screening. NO    Advanced Directive:   1. Do you have an Advanced Directive? YES    2. Would you like information on Advanced Directives?  NO

## 2018-11-29 LAB
BACTERIA SPEC CULT: NORMAL
SERVICE CMNT-IMP: NORMAL

## 2018-11-30 NOTE — PROGRESS NOTES
Notify pt her culture was fine. Will just monitor things from here. No further recommendations at this time.

## 2018-12-10 ENCOUNTER — TELEPHONE (OUTPATIENT)
Dept: FAMILY MEDICINE CLINIC | Age: 63
End: 2018-12-10

## 2018-12-10 RX ORDER — CIPROFLOXACIN 500 MG/1
500 TABLET ORAL 2 TIMES DAILY
Qty: 14 TAB | Refills: 0 | Status: SHIPPED | OUTPATIENT
Start: 2018-12-10 | End: 2018-12-17

## 2018-12-10 NOTE — TELEPHONE ENCOUNTER
I sent in a week of Cipro. If she does not improve after this, she will need to see Urology for further evaluation.

## 2018-12-10 NOTE — TELEPHONE ENCOUNTER
Patient called to request another round of antibiotics said she felt like she was improving on her last three days then 3 days after completing it she started feeling bad again.

## 2018-12-14 ENCOUNTER — OFFICE VISIT (OUTPATIENT)
Dept: FAMILY MEDICINE CLINIC | Age: 63
End: 2018-12-14

## 2018-12-14 VITALS
RESPIRATION RATE: 18 BRPM | SYSTOLIC BLOOD PRESSURE: 126 MMHG | HEART RATE: 88 BPM | HEIGHT: 62 IN | OXYGEN SATURATION: 99 % | BODY MASS INDEX: 26.72 KG/M2 | DIASTOLIC BLOOD PRESSURE: 86 MMHG | TEMPERATURE: 98.8 F | WEIGHT: 145.2 LBS

## 2018-12-14 DIAGNOSIS — J01.01 ACUTE RECURRENT MAXILLARY SINUSITIS: Primary | ICD-10-CM

## 2018-12-14 RX ORDER — FLUTICASONE PROPIONATE 50 MCG
2 SPRAY, SUSPENSION (ML) NASAL DAILY
Qty: 1 BOTTLE | Refills: 0 | Status: SHIPPED | OUTPATIENT
Start: 2018-12-14 | End: 2019-08-21

## 2018-12-14 RX ORDER — DOXYCYCLINE 100 MG/1
100 TABLET ORAL 2 TIMES DAILY
Qty: 28 TAB | Refills: 0 | Status: SHIPPED | OUTPATIENT
Start: 2018-12-14 | End: 2018-12-28

## 2018-12-14 NOTE — PROGRESS NOTES
Assessment/Plan:    *Diagnoses and all orders for this visit:    1. Acute recurrent maxillary sinusitis  -     doxycycline (ADOXA) 100 mg tablet; Take 1 Tab by mouth two (2) times a day for 14 days. -     fluticasone (FLONASE) 50 mcg/actuation nasal spray; 2 Sprays by Both Nostrils route daily. She will continue Sudafed, Saline spray and get more Flonase. Will await allergist recommendations. The plan was discussed with the patient. The patient verbalized understanding and is in agreement with the plan. All medication potential side effects were discussed with the patient.    -------------------------------------------------------------------------------------------------------------------        Darylene Hires is a 63 y.o. female and presents with Sore Throat         Subjective:  Pt returns once again saying that she is not getting better with regards to her sinus issues, + cough, + terrible sore throat. She was treated in Nov by Dr. Jorge Sanders with a Z-pack, then was diagnosed with Flu. She has recovered from the Flu then developed a UTI. Says now that she never really recovered from the sinus infection and feels she has gotten worse. She does see an allergist and gets shots q 6 weeks. She has already called and made herself an appt with her allergist for [place] to discuss things further. I mentioned to pt my concerns regarding her taking so many Abxs in a short time interval and the risk for developing C diff colitis. 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 ROS:  Review of Systems - Negative except as above. The problem list was updated as a part of today's visit.   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, T45.1X5A    Chest wall pain following surgery R07.89, G89.18    Renal insufficiency N28.9    Proteinuria R80.9    Chronic insomnia F51.04    Paroxysmal sleep G47.419    Encounter for long-term (current) use of high-risk medication Z79.899    Foraminal stenosis of lumbar region M99.83    Lumbar facet arthropathy M47.816    Lumbar post-laminectomy syndrome M96.1    Lumbar neuritis M54.16    Spasm of back muscles M62.830    Drug-induced constipation K59.03    Bilateral sacroiliitis (HCC) M46.1    Drug-induced nausea and vomiting R11.2, T50.905A    Irritable bowel syndrome with both constipation and diarrhea K58.2    Fibromyalgia M79.7    Anxiety F41.9    Depression F32.9    Hypersomnolence G47.10    Chronic pain syndrome G89.4    Recurrent depression (HCC) F33.9       The PSH, FH were reviewed. SH:  Social History     Tobacco Use    Smoking status: Never Smoker    Smokeless tobacco: Never Used   Substance Use Topics    Alcohol use: No    Drug use: No       Medications/Allergies:  Current Outpatient Medications on File Prior to Visit   Medication Sig Dispense Refill    ciprofloxacin HCl (CIPRO) 500 mg tablet Take 1 Tab by mouth two (2) times a day for 7 days. 14 Tab 0    pseudoephedrine ER (SUDAFED 24 HOUR) 240 mg Tb24 tablet Take 240 mg by mouth every twenty-four (24) hours.  traMADol (ULTRAM) 50 mg tablet Take 2 Tabs by mouth every six (6) hours as needed for Pain. Max Daily Amount: 400 mg. 240 Tab 0    levothyroxine (SYNTHROID) 100 mcg tablet TAKE 1 TABLET DAILY BEFORE BREAKFAST 90 Tab 1    cholecalciferol, vitamin D3, (VITAMIN D3) 2,000 unit tab Take  by mouth.  methocarbamol (ROBAXIN) 750 mg tablet TAKE 1 TAB BY MOUTH THREE (3) TIMES DAILY.  AS NEEDED FOR MUSCLE SPASMS 90 Tab 2    ondansetron (ZOFRAN ODT) 8 mg disintegrating tablet Take 1 Tab by mouth every eight (8) hours as needed for Nausea. 30 Tab 2    Omega-3-DHA-EPA-Fish Oil 1,000 mg (120 mg-180 mg) cap Take  by mouth.  ranitidine (ZANTAC) 150 mg tablet Take 150 mg by mouth daily as needed.  loratadine (CLARITIN) 10 mg tablet Take 10 mg by mouth daily as needed.  methylPREDNISolone (MEDROL DOSEPACK) 4 mg tablet Follow dose pack instructions 1 Dose Pack 0     No current facility-administered medications on file prior to visit.          Allergies   Allergen Reactions    Adhesive Rash    Aspirin Other (comments) and Nausea and Vomiting     G6PD  G6PD  GI BLEED AND ULCER    Contrast Agent [Iodine] Rash     Allergic to CT Dye    Dilantin [Phenytoin Sodium Extended] Other (comments)     Difficulty waking    Iodinated Contrast- Oral And Iv Dye Rash     \"bumps on face\"    Lipitor [Atorvastatin] Other (comments)     PKU     Pcn [Penicillins] Rash and Hives     \"sores all over the body\"    Phenytoin Unknown (comments)     \"Trouble waking up\"    Sulfa (Sulfonamide Antibiotics) Rash and Nausea and Vomiting     G6PD  G6PD    Tegretol [Carbamazepine] Other (comments) and Vertigo     \"Trouble waking up\"  Difficulty waking up         Health Maintenance:   Health Maintenance   Topic Date Due    Pneumococcal 19-64 Highest Risk (1 of 3 - PCV13) 12/23/1976    Shingrix Vaccine Age 50> (1 of 2) 12/23/2007    MEDICARE YEARLY EXAM  10/19/2018    PAP AKA CERVICAL CYTOLOGY  12/13/2019    BREAST CANCER SCRN MAMMOGRAM  02/28/2020    COLONOSCOPY  06/22/2021    DTaP/Tdap/Td series (2 - Td) 04/01/2025    Hepatitis C Screening  Completed    Influenza Age 5 to Adult  Completed       Objective:  Visit Vitals  /86 (BP 1 Location: Left arm, BP Patient Position: Sitting)   Pulse 88   Temp 98.8 °F (37.1 °C) (Oral)   Resp 18   Ht 5' 2\" (1.575 m)   Wt 145 lb 3.2 oz (65.9 kg)   LMP 08/28/2002   SpO2 99%   BMI 26.56 kg/m²          Nurses notes and VS reviewed. Physical Examination: General appearance - ill-appearing  Ears - bilateral TM's and external ear canals normal  Nose - mucosal congestion and mucosal erythema  Mouth - erythematous  Neck - supple, no significant adenopathy  Chest - clear to auscultation, no wheezes, rales or rhonchi, symmetric air entry  Heart - normal rate, regular rhythm, normal S1, S2, no murmurs, rubs, clicks or gallops          Labwork and Ancillary Studies:    CBC w/Diff  Lab Results   Component Value Date/Time    WBC 6.5 05/15/2018 12:00 AM    HGB 11.1 05/15/2018 12:00 AM    PLATELET 172 03/70/4144 12:00 AM         Basic Metabolic Profile  Lab Results   Component Value Date/Time    Sodium 144 05/15/2018 12:00 AM    Potassium 4.4 05/15/2018 12:00 AM    Chloride 100 05/15/2018 12:00 AM    CO2 22 05/15/2018 12:00 AM    Anion gap 8 11/24/2015 10:47 AM    Glucose 58 (L) 05/15/2018 12:00 AM    BUN 16 05/15/2018 12:00 AM    Creatinine 0.80 05/15/2018 12:00 AM    BUN/Creatinine ratio 20 05/15/2018 12:00 AM    GFR est AA 93 05/15/2018 12:00 AM    GFR est non-AA 80 05/15/2018 12:00 AM    Calcium 9.0 05/15/2018 12:00 AM         LFT  Lab Results   Component Value Date/Time    ALT (SGPT) 10 05/15/2018 12:00 AM    AST (SGOT) 18 05/15/2018 12:00 AM    Alk.  phosphatase 60 05/15/2018 12:00 AM    Bilirubin, direct 0.06 05/15/2018 12:00 AM    Bilirubin, total 0.2 05/15/2018 12:00 AM         Cholesterol  Lab Results   Component Value Date/Time    Cholesterol, total 240 (H) 05/15/2018 12:00 AM    HDL Cholesterol 50 05/15/2018 12:00 AM    LDL, calculated 152 (H) 05/15/2018 12:00 AM    Triglyceride 188 (H) 05/15/2018 12:00 AM    CHOL/HDL Ratio 5.9 (H) 11/24/2015 10:47 AM

## 2018-12-14 NOTE — PROGRESS NOTES
Aura Lopez is a 63 y.o. female (: 1955) presenting to address:  Patient has already received the flu vaccine. Chief Complaint   Patient presents with    Sore Throat       Vitals:    18 1044   BP: 126/86   Pulse: 88   Resp: 18   Temp: 98.8 °F (37.1 °C)   TempSrc: Oral   SpO2: 99%   Weight: 145 lb 3.2 oz (65.9 kg)   Height: 5' 2\" (1.575 m)   PainSc:   7   PainLoc: Generalized   LMP: 2002       Hearing/Vision:   No exam data present    Learning Assessment:     Learning Assessment 10/9/2017   PRIMARY LEARNER Patient   HIGHEST LEVEL OF EDUCATION - PRIMARY LEARNER  -   BARRIERS PRIMARY LEARNER -   CO-LEARNER CAREGIVER -   PRIMARY LANGUAGE ENGLISH   LEARNER PREFERENCE PRIMARY READING     LISTENING     PICTURES     VIDEOS     DEMONSTRATION   ANSWERED BY self   RELATIONSHIP SELF     Depression Screening:     PHQ over the last two weeks 10/19/2018   Little interest or pleasure in doing things Not at all   Feeling down, depressed, irritable, or hopeless Not at all   Total Score PHQ 2 0     Fall Risk Assessment:     Fall Risk Assessment, last 12 mths 10/19/2018   Able to walk? Yes   Fall in past 12 months? No   Fall with injury? -   Number of falls in past 12 months -   Fall Risk Score -     Abuse Screening:     Abuse Screening Questionnaire 10/19/2018   Do you ever feel afraid of your partner? N   Are you in a relationship with someone who physically or mentally threatens you? N   Is it safe for you to go home? Y     Coordination of Care Questionaire:   1. Have you been to the ER, urgent care clinic since your last visit? Hospitalized since your last visit? NO    2. Have you seen or consulted any other health care providers outside of the [address] [place] [address] since your last visit? Include any pap smears or colon screening. NO    Advanced Directive:   1. Do you have an Advanced Directive? YES    2. Would you like information on Advanced Directives?  NO

## 2018-12-14 NOTE — PATIENT INSTRUCTIONS
Sinusitis: Care Instructions  Your Care Instructions    Sinusitis is an infection of the lining of the sinus cavities in your head. Sinusitis often follows a cold. It causes pain and pressure in your head and face. In most cases, sinusitis gets better on its own in 1 to 2 weeks. But some mild symptoms may last for several weeks. Sometimes antibiotics are needed. Follow-up care is a key part of your treatment and safety. Be sure to make and go to all appointments, and call your doctor if you are having problems. It's also a good idea to know your test results and keep a list of the medicines you take. How can you care for yourself at home? · Take an over-the-counter pain medicine, such as acetaminophen (Tylenol), ibuprofen (Advil, Motrin), or naproxen (Aleve). Read and follow all instructions on the label. · If the doctor prescribed antibiotics, take them as directed. Do not stop taking them just because you feel better. You need to take the full course of antibiotics. · Be careful when taking over-the-counter cold or flu medicines and Tylenol at the same time. Many of these medicines have acetaminophen, which is Tylenol. Read the labels to make sure that you are not taking more than the recommended dose. Too much acetaminophen (Tylenol) can be harmful. · Breathe warm, moist air from a steamy shower, a hot bath, or a sink filled with hot water. Avoid cold, dry air. Using a humidifier in your home may help. Follow the directions for cleaning the machine. · Use saline (saltwater) nasal washes to help keep your nasal passages open and wash out mucus and bacteria. You can buy saline nose drops at a grocery store or drugstore. Or you can make your own at home by adding 1 teaspoon of salt and 1 teaspoon of baking soda to 2 cups of distilled water. If you make your own, fill a bulb syringe with the solution, insert the tip into your nostril, and squeeze gently. Versa Dienes your nose.   · Put a hot, wet towel or a warm gel pack on your face 3 or 4 times a day for 5 to 10 minutes each time. · Try a decongestant nasal spray like oxymetazoline (Afrin). Do not use it for more than 3 days in a row. Using it for more than 3 days can make your congestion worse. When should you call for help? Call your doctor now or seek immediate medical care if:    · You have new or worse swelling or redness in your face or around your eyes.     · You have a new or higher fever.    Watch closely for changes in your health, and be sure to contact your doctor if:    · You have new or worse facial pain.     · The mucus from your nose becomes thicker (like pus) or has new blood in it.     · You are not getting better as expected. Where can you learn more? Go to http://celia-staci.info/. Enter G767 in the search box to learn more about \"Sinusitis: Care Instructions. \"  Current as of: March 28, 2018  Content Version: 11.8  © 3233-5069 [employer]. Care instructions adapted under license by [email or web address] (which disclaims liability or warranty for this information). If you have questions about a medical condition or this instruction, always ask your healthcare professional. [place] [address] any warranty or liability for your use of this information.

## 2018-12-31 DIAGNOSIS — M96.1 LUMBAR POST-LAMINECTOMY SYNDROME: ICD-10-CM

## 2018-12-31 DIAGNOSIS — G89.4 CHRONIC PAIN SYNDROME: ICD-10-CM

## 2018-12-31 NOTE — TELEPHONE ENCOUNTER
Maci Connelly has called requesting a refill of their controlled medication, Tramadol 50 mg tab, for the management of chronic low back pain and polyarthralgia. Last office visit date: 10/19/18 with Sandip and has a f/u appt on 1/15/19 with Best Maldonado. Last opioid care agreement 12/6/17  Last UDS was done 6/5/18    Date last  was pulled and reviewed : 12/31/18. Last fill date: 11/17/18    Was the patient compliant when the above report was pulled? yes    Analgesia: Patient reports 70% pain relief on current regimen. Aberrancies: No aberrancies noted in the last 30 days. ADL's: Patient states they are able to perform ADL's on current regimen. Adverse Reaction: Patient reports no adverse reactions at this time. Provider's last note and plan of care reviewed? yes  Request forwarded to provider for review.

## 2019-01-02 DIAGNOSIS — Z79.899 ENCOUNTER FOR LONG-TERM (CURRENT) USE OF HIGH-RISK MEDICATION: Primary | ICD-10-CM

## 2019-01-02 RX ORDER — TRAMADOL HYDROCHLORIDE 50 MG/1
100 TABLET ORAL
Qty: 180 TAB | Refills: 0 | Status: SHIPPED | OUTPATIENT
Start: 2019-01-02 | End: 2019-02-20 | Stop reason: SDUPTHER

## 2019-01-02 RX ORDER — NALOXONE HYDROCHLORIDE 4 MG/.1ML
SPRAY NASAL
Qty: 2 EACH | Refills: 0 | Status: SHIPPED | OUTPATIENT
Start: 2019-01-02

## 2019-01-02 NOTE — TELEPHONE ENCOUNTER
Reviewed, patient needs UDS and updated opioid care agreement prior to picking up prescription thank you

## 2019-01-02 NOTE — TELEPHONE ENCOUNTER
Patient identified using two patient identifiers (name and ); patient advised prescriptions are ready for pick-up. I also informed patient that she would have to be the one to pick prescription up. Patient verbalized understanding.

## 2019-01-04 ENCOUNTER — OFFICE VISIT (OUTPATIENT)
Dept: PAIN MANAGEMENT | Age: 64
End: 2019-01-04

## 2019-01-04 DIAGNOSIS — Z79.899 ENCOUNTER FOR LONG-TERM (CURRENT) USE OF HIGH-RISK MEDICATION: Primary | ICD-10-CM

## 2019-01-04 LAB
ALCOHOL UR POC: NORMAL
AMPHETAMINES UR POC: NEGATIVE
BARBITURATES UR POC: NORMAL
BENZODIAZEPINES UR POC: NEGATIVE
BUPRENORPHINE UR POC: NEGATIVE
CANNABINOIDS UR POC: NEGATIVE
CARISOPRODOL UR POC: NORMAL
COCAINE UR POC: NEGATIVE
FENTANYL UR POC: NORMAL
MDMA/ECSTASY UR POC: NORMAL
METHADONE UR POC: NEGATIVE
METHAMPHETAMINE UR POC: NORMAL
METHYLPHENIDATE UR POC: NORMAL
OPIATES UR POC: NEGATIVE
OXYCODONE UR POC: NORMAL
PHENCYCLIDINE UR POC: NORMAL
PROPOXYPHENE UR POC: NORMAL
TRAMADOL UR POC: NORMAL
TRICYCLICS UR POC: NORMAL

## 2019-01-15 ENCOUNTER — OFFICE VISIT (OUTPATIENT)
Dept: PAIN MANAGEMENT | Age: 64
End: 2019-01-15

## 2019-01-15 VITALS
RESPIRATION RATE: 18 BRPM | DIASTOLIC BLOOD PRESSURE: 85 MMHG | TEMPERATURE: 98.2 F | HEIGHT: 62 IN | HEART RATE: 91 BPM | OXYGEN SATURATION: 96 % | SYSTOLIC BLOOD PRESSURE: 133 MMHG | BODY MASS INDEX: 26.56 KG/M2

## 2019-01-15 DIAGNOSIS — M54.5 CHRONIC BILATERAL LOW BACK PAIN, WITH SCIATICA PRESENCE UNSPECIFIED: ICD-10-CM

## 2019-01-15 DIAGNOSIS — M76.891 ENTHESOPATHY OF HIP REGION ON BOTH SIDES: ICD-10-CM

## 2019-01-15 DIAGNOSIS — G57.00 PIRIFORMIS SYNDROME, UNSPECIFIED LATERALITY: ICD-10-CM

## 2019-01-15 DIAGNOSIS — G89.29 CHRONIC BILATERAL LOW BACK PAIN, WITH SCIATICA PRESENCE UNSPECIFIED: ICD-10-CM

## 2019-01-15 DIAGNOSIS — Z79.899 ENCOUNTER FOR LONG-TERM (CURRENT) USE OF HIGH-RISK MEDICATION: ICD-10-CM

## 2019-01-15 DIAGNOSIS — M76.892 ENTHESOPATHY OF HIP REGION ON BOTH SIDES: ICD-10-CM

## 2019-01-15 DIAGNOSIS — M51.37 DEGENERATION, INTERVERTEBRAL DISC, LUMBOSACRAL: Primary | ICD-10-CM

## 2019-01-15 RX ORDER — ACETAMINOPHEN 500 MG
500 TABLET ORAL
Qty: 200 TAB | Refills: 2 | Status: SHIPPED | OUTPATIENT
Start: 2019-01-15 | End: 2019-02-14

## 2019-01-15 RX ORDER — DOCUSATE SODIUM 100 MG/1
100 CAPSULE, LIQUID FILLED ORAL
Qty: 60 CAP | Refills: 2 | Status: SHIPPED | OUTPATIENT
Start: 2019-01-15 | End: 2019-04-15

## 2019-01-15 NOTE — PROGRESS NOTES
Social History Socioeconomic History  Marital status:  Spouse name: Not on file  Number of children: Not on file  Years of education: Not on file  Highest education level: Not on file Social Needs  Financial resource strain: Not on file  Food insecurity - worry: Not on file  Food insecurity - inability: Not on file  Transportation needs - medical: Not on file  Transportation needs - non-medical: Not on file Occupational History  Not on file Tobacco Use  Smoking status: Never Smoker  Smokeless tobacco: Never Used Substance and Sexual Activity  Alcohol use: No  
 Drug use: No  
 Sexual activity: Not Currently Comment: Last mentrual  Other Topics Concern  Not on file Social History Narrative  Not on file Family History Problem Relation Age of Onset  Diabetes Mother  Heart Attack Mother  Heart Disease Mother   
     age 64-   Stroke Other   
     aunts  Hypertension Other   
     great grandparents  Diabetes Other   
     great grandmother Allergies Allergen Reactions  Adhesive Rash  Aspirin Other (comments) and Nausea and Vomiting G6PD 
G6PD 
GI BLEED AND ULCER  
 Contrast Agent [Iodine] Rash Allergic to CT Dye  
 Dilantin [Phenytoin Sodium Extended] Other (comments) Difficulty waking  Iodinated Contrast- Oral And Iv Dye Rash \"bumps on face\"  Lipitor [Atorvastatin] Other (comments) PKU  Pcn [Penicillins] Rash and Hives \"sores all over the body\"  Phenytoin Unknown (comments) \"Trouble waking up\"  Sulfa (Sulfonamide Antibiotics) Rash and Nausea and Vomiting G6PD 
G6PD  Tegretol [Carbamazepine] Other (comments) and Vertigo \"Trouble waking up\" Difficulty waking up Past Medical History:  
Diagnosis Date  Anemia  Asthma   
 on allergy shots, no inhalaers  Back injury  Back pain  [facility] 2011 Dr. Sahara Millan; Dr. Boyd Labrador  Bruises easily  Carpal tunnel syndrome, right EMG done only in left however  Chronic pain   
 pelvic pain  Constipation  G6PD (glucose 6 phosphatase deficiency)  Gastroparesis  GERD (gastroesophageal reflux disease)   
 acid reflux  H/O colonoscopy 3/15/2013 Dr. Timi Santiago  Hepatitis A   
 History of abscessed tooth  Hypertension   
 no mediacations, doctor is monitoring  Hyperthyroidism Grave's- radiation  IBS (irritable bowel syndrome)  Lymphedema 2012 in her right underarm  Numbness   
 legs  Osteopenia 3/19/2013  Other and unspecified hyperlipidemia  PUD (peptic ulcer disease) pyloric ulcer  Unspecified hypothyroidism Past Surgical History:  
Procedure Laterality Date  HX BREAST RECONSTRUCTION  2012  HX BREAST RECONSTRUCTION    
 HX CARPAL TUNNEL RELEASE  HX CYST INCISION AND DRAINAGE    
 tooth abscess  HX MASTECTOMY  2012  
 right side  HX MASTECTOMY Right  HX MOHS PROCEDURES    
 HX ORTHOPAEDIC    
 rotator cuff repair on left- Dr. Abbott Stands  HX ORTHOPAEDIC  05/2016  
 decompression, spinal fusion  HX OTHER SURGICAL    
 spinal chord stimulator inssertion/ did not stay in  
 
Current Outpatient Medications on File Prior to Visit Medication Sig  
 traMADol (ULTRAM) 50 mg tablet Take 2 Tabs by mouth every eight (8) hours as needed for Pain for up to 30 days. Max Daily Amount: 300 mg.  
 fluticasone (FLONASE) 50 mcg/actuation nasal spray 2 Sprays by Both Nostrils route daily.  pseudoephedrine ER (SUDAFED 24 HOUR) 240 mg Tb24 tablet Take 120 mg by mouth every twenty-four (24) hours.  levothyroxine (SYNTHROID) 100 mcg tablet TAKE 1 TABLET DAILY BEFORE BREAKFAST  cholecalciferol, vitamin D3, (VITAMIN D3) 2,000 unit tab Take 2,000 Units by mouth daily.  methocarbamol (ROBAXIN) 750 mg tablet TAKE 1 TAB BY MOUTH THREE (3) TIMES DAILY. AS NEEDED FOR MUSCLE SPASMS  ondansetron (ZOFRAN ODT) 8 mg disintegrating tablet Take 1 Tab by mouth every eight (8) hours as needed for Nausea.  Omega-3-DHA-EPA-Fish Oil 1,000 mg (120 mg-180 mg) cap Take 1 Cap by mouth daily.  ranitidine (ZANTAC) 150 mg tablet Take 150 mg by mouth daily as needed.  loratadine (CLARITIN) 10 mg tablet Take 10 mg by mouth daily as needed.  naloxone (NARCAN) 4 mg/actuation nasal spray Use 1 spray intranasally into 1 nostril as needed for opioid respiratory emergency only. (Patient taking differently: 1 [place] by IntraNASal route once as needed (pt states she has this at home). Use 1 spray intranasally into 1 nostril as needed for opioid respiratory emergency only.) No current facility-administered medications on file prior to visit. Referred by Hanna around 1995. Pt was last seen here on October 19, 2018 Calculated MEQ -up to 40 Naloxone rescue -yes Prophylactic bowel program -no Date of last OCA January 2019. Last UDS January 4, 2019 , date checked  today, findings were consistent GIC-4 and 7 ROBERT -72% COMM-6 HPI:Pt dealing with significant sinusitis with PCP for almost 4 weeks now. Roxann Ruth is a 66 y.o. female here for f/u visit for ongoing evaluation of chronic lower back pain and right lower extremity pain. .  Pain began with an acute onset during a lumbar flexion and rotation injury with a heavy load in 1991. This eventually led to an L5-S1 laminectomy and discectomy in 1994. Eventually she had an L4-5 and L5-S1 fusion in 2016. Pt denies interval changes in the character or distribution of pain. Pain is located at the lumbosacral junction as a constant stabbing pain that now ranges from 4-7/10. She also has pain all that radiates through the gluteal region to the bilateral greater trochanter region that is stabbing to burning in nature. The right side is worse than the left side. Symptoms worsen with prolonged static postures, pushing, pulling, cold weather, and riding in the car. Symptoms improve with lying down and with aquatic therapy. --RFA done in October has been helpful. --She did not get the compound cream  
--Diclofenac 3% gel works fairly well. --Aquatic therapy for the low back pain was in approximately 2017 patient reports significant benefit with this activity. --tylenol was not obtained following last visit. --robaxin continues to be helpful. --tramadol 50mg 1-2 tabs up to tid with 180 tabs. Pt reports partial but incomplete analgesia with the current opioid regimen which helps to improve activity tolerance and function. Pt denies aberrant behaviors or any adverse effects except constipation which is controlled with medication. Patient states that she has not required as many tablets as had been provided for 30-day supply. She has tolerated discontinuation of fentanyl and is reporting improved overall pain and function since then. ROS:Review of systems is negative for diarrhea, chest pain, acute changes in hearing, falls bladder incontinence, bowel incontinence, depression, anxiety, suicidal ideation, homicidal ideation, alcohol use. Review of systems positive for constipation(she has senna at home we will provide her with Colace.),  Because of the recent sinusitis she reports fever, chills, nausea, vomiting, difficulty breathing, trouble swallowing, dizziness, abdominal pain and generalized weakness. Imaging: Report from lumbar plain films done on May 23, 2017 showed,\"\"\"\"\"\"\"\"\"\"\"\"\"\"\"\"\"FINDINGS:  
Posterior fixation L4-S1. No slade-hardware lucencies. No subluxation with 
flexion or extension. Mild degenerative spurring in the upper lumbar vertebra. No slade-hardware lucencies. No compression deformities. 
  
IMPRESSION IMPRESSION: 
  
1. No acute compression deformity. 2. No subluxation with flexion or extension.  
 \"\"\"\"\"\"\"\"\"\"\"\"\"\"\"\"' Vitals: 01/15/19 1351 BP: 133/85 Pulse: 91  
Resp: 18 Temp: 98.2 °F (36.8 °C) TempSrc: Oral  
SpO2: 96% Height: 5' 2\" (1.575 m) PainSc:   5 PainLoc: Back LMP: 08/28/2002 PE: 
AFVSS, no acute distress, normal body habitus. A&OXs 3. 
normocephalic, atraumatic. Conjugate gaze, clear sclerae. Speech is clear and appropriate. Mood is pleasant and appropriate. Patient is cooperative. Active range of motion for lumbar flexion is reduced by approximately 50% Active range of motion for lumbar extension is near full without significant reproduction of primary pain complaint. Lumbar flexion actually mildly reproduces pain over bilateral right greater than left, piriformis muscle regions. There is no significant tenderness to palpation along the lumbar spine or bilateral lumbar paraspinals. There is mild tenderness to palpation at bilateral piriformis muscles and at bilateral greater trochanters again right greater than left. No significant tenderness to palpation at bilateral sacroiliac joint regions. The left lower extremity mildly(less than 5 mm) shorter than the right. Negative seated straight leg raise bilaterally. Negative FABERs on the right and the left. Negative Stinchfield's on the right and a left. Negative FADIRS on the right and the left. Gait is within functional limits with mild antalgia. Balance is within functional limits. Primary Care Physician Poli Mckeon Wero [facility] [address] [facility] [address] [place] [address] 
[phone] PHQ -- . PHQ over the last two weeks 1/15/2019 Little interest or pleasure in doing things Not at all Feeling down, depressed, irritable, or hopeless Not at all Total Score PHQ 2 0 Assessment/Plan: ICD-10-CM ICD-9-CM 1. Degeneration, intervertebral disc, lumbosacral M51.37 722.52   
2. Chronic bilateral low back pain, with sciatica presence unspecified M54.5 724.2 G89.29 338.29   
3. Encounter for long-term (current) use of high-risk medication Z79.899 V58.69 4. Piriformis syndrome, unspecified laterality G57.00 355.0 5. Enthesopathy of hip region on both sides M76.891 726.5 V95.430    
  
 
--I do not recommend long-term opioid therapy for this person's chronic pain. I believe the risks outweigh any potential benefits. We will progress with a gradual opioid wean. Patient was educated on signs and symptoms of opioid withdrawal and advised to call the clinic should these symptoms arise so that we may provide support as needed. --At time of refill(due on February 3) patient with reduced supply of tramadol 50 mg tablets to be taken as needed with a maximum of 6 tablets daily and 160 tablets to be budgeted over 30 days. At the following refill (March) reduce the total number of tablets to 140 tablets for 30 days. --Consider need for Elavil trial at future visits.  Continue methocarbamol as needed, continue Tylenol 500 mg as needed up to 4 times daily. --Patient with signs and symptoms of trochanteric enthesopathy and piriformis muscle pain but sacroiliac joint pain was not elicited today. This can be a symptom of adjacent level syndrome since the patient is fused at L5-S1 level the next potential mobile segment would be a sacroiliac joint which is largely stabilized through piriformis muscle which attaches in turn to greater trochanter. Patient may return as needed for piriformis muscle injection or for greater trochanteric injection at the enthesis. --She has responded positively in the past sacroiliac joint injections and lumbar radiofrequency ablations. --Patient to independently study either yoga and or dread chi for people with chronic pain. --Today she was given educational handouts on piriformis stretching and this should be added to her daily regimen.  
 
GOALS: 
To establish complementary and integrative plan of care to address chronic pain issues while minimizing pharmaceuticals to maximize patient's function improve quality of life. Education: 
Patient again educated on the importance of strict compliance with the opioid care agreement while on opioid therapy. Patient also again educated that they should avoid driving while on chronic opioid therapy. Also advised to avoid alcohol and to avoid benzodiazepines while on opioid therapy. F/u:. Follow-up Disposition: 
Return in about 3 months (around 4/15/2019) for 30 min.

## 2019-01-15 NOTE — PATIENT INSTRUCTIONS
Piriformis Syndrome: Exercises Your Care Instructions Here are some examples of typical rehabilitation exercises for your condition. Start each exercise slowly. Ease off the exercise if you start to have pain. Your doctor or physical therapist will tell you when you can start these exercises and which ones will work best for you. How to do the exercises Hip rotator stretch 1. Lie on your back with both knees bent and your feet flat on the floor. 2. Put the ankle of your affected leg on your opposite thigh near your knee. 3. Use your hand to gently push your knee (on your affected leg) away from your body until you feel a gentle stretch around your hip. 4. Hold the stretch for 15 to 30 seconds. 5. Repeat 2 to 4 times. 6. Switch legs and repeat steps 1 through 5. Piriformis stretch 1. Lie on your back with your legs straight. 2. Lift your affected leg and bend your knee. With your opposite hand, reach across your body, and then gently pull your knee toward your opposite shoulder. 3. Hold the stretch for 15 to 30 seconds. 4. Repeat with your other leg. 5. Repeat 2 to 4 times on each side. Lower abdominal strengthening 1. Lie on your back with your knees bent and your feet flat on the floor. 2. Tighten your belly muscles by pulling your belly button in toward your spine. 3. Lift one foot off the floor and bring your knee toward your chest, so that your knee is straight above your hip and your leg is bent like the letter \"L. \" 
4. Lift the other knee up to the same position. 5. Lower one leg at a time to the starting position. 6. Keep alternating legs until you have lifted each leg 8 to 12 times. 7. Be sure to keep your belly muscles tight and your back still as you are moving your legs. Be sure to breathe normally. Follow-up care is a key part of your treatment and safety.  Be sure to make and go to all appointments, and call your doctor if you are having problems. It's also a good idea to know your test results and keep a list of the medicines you take. Where can you learn more? Go to http://celia-staci.info/. Enter E524 in the search box to learn more about \"Piriformis Syndrome: Exercises. \" Current as of: November 29, 2017 Content Version: 11.8 © 1407-7934 [employer]. Care instructions adapted under license by [employer] (which disclaims liability or warranty for this information). If you have questions about a medical condition or this instruction, always ask your healthcare professional. [place] [address] any warranty or liability for your use of this information.

## 2019-01-15 NOTE — PROGRESS NOTES
Nursing Notes Patient presents to the office today in follow-up. Patient rates her pain at 5/10 on the numerical pain scale. Reviewed medications with counts as follows:   
Rx Date filled Qty Dispensed Pill Count Last Dose Short Tramadol 50 mg  01/04/19 180 150 This a.m. no  
       
       
       
        
Last opioid agreement 01/04/19 Last urine drug screen 01/04/19 Comments: POC UDS was not performed in office today Any new labs or imaging since last appointment? NO Have you been to an emergency room (ER) or urgent care clinic since your last visit? NO Have you been hospitalized since your last visit? NO If yes, where, when, and reason for visit? Have you seen or consulted any other health care providers outside of the [address] [place] [address]  since your last visit? YES If yes, where, when, and reason for visit? pcp several times for being sick. All notes in [facility] Ms. Samantha Borges has a reminder for a \"due or due soon\" health maintenance. I have asked that she contact her primary care provider for follow-up on this health maintenance. PHQ over the last two weeks 1/15/2019 Little interest or pleasure in doing things Not at all Feeling down, depressed, irritable, or hopeless Not at all Total Score PHQ 2 0

## 2019-01-16 ENCOUNTER — TELEPHONE (OUTPATIENT)
Dept: PAIN MANAGEMENT | Age: 64
End: 2019-01-16

## 2019-01-16 NOTE — TELEPHONE ENCOUNTER
Script for [employer] and Tylenol was called over to the [facility] on [address].  It was sent over electronically to Lance Garcia the patient states that she is workmans comp and it goes through [facility].

## 2019-02-04 DIAGNOSIS — M96.1 LUMBAR POST-LAMINECTOMY SYNDROME: ICD-10-CM

## 2019-02-04 DIAGNOSIS — G89.4 CHRONIC PAIN SYNDROME: ICD-10-CM

## 2019-02-19 ENCOUNTER — TELEPHONE (OUTPATIENT)
Dept: FAMILY MEDICINE CLINIC | Age: 64
End: 2019-02-19

## 2019-02-19 NOTE — TELEPHONE ENCOUNTER
Patient called to notify Dr. Nicole Martino that she is going to see an ENT at [facility] (Dr. Cornelio Gary) for recurrent sinus infections. She has given them Dr. Stiven Estevez information so that they can send over office notes and other information for the patients records.

## 2019-02-20 NOTE — TELEPHONE ENCOUNTER
Derek Phillisp has called requesting a refill of their controlled medication, Tramadol 50 mg tab, for the management of chronic back pain. Last office visit date: 1/15/19 with Ramón Collazo and has a f/u appt on 4/11/19 with them. Last opioid care agreement 1/4/19  Last UDS was done 1/4/19    Date last  was pulled and reviewed : 2/20/19  Last fill date for medication was 1/4/19    Was the patient compliant when the above report was pulled? yes     Analgesia: Patient reports 70% pain relief on current regimen. Aberrancies: No aberrancies noted in the last 30 days. ADL's: Patient states they are able to perform ADL's on current regimen. Adverse Reaction: Patient reports no adverse reactions at this time since taking Senna and Colace. Provider's last note and plan of care reviewed? yes  Request forwarded to provider for review. Just and FYI patient went to the ER on 1/18/19 for acute recurrent sinusitis. I didn't see patient filled any Rxs for controlled substances from this encounter.

## 2019-02-21 RX ORDER — TRAMADOL HYDROCHLORIDE 50 MG/1
TABLET ORAL
Qty: 160 TAB | Refills: 0 | Status: SHIPPED | OUTPATIENT
Start: 2019-02-21 | End: 2019-04-11 | Stop reason: SDUPTHER

## 2019-02-21 NOTE — TELEPHONE ENCOUNTER
11:55 am The patient outgoing message stated the patient's full name at cell I LVM stating that her Rx for Tramadol is ready for .

## 2019-03-01 ENCOUNTER — TELEPHONE (OUTPATIENT)
Dept: PAIN MANAGEMENT | Age: 64
End: 2019-03-01

## 2019-03-15 DIAGNOSIS — E03.9 HYPOTHYROIDISM, UNSPECIFIED TYPE: ICD-10-CM

## 2019-03-15 DIAGNOSIS — E78.00 PURE HYPERCHOLESTEROLEMIA: Primary | ICD-10-CM

## 2019-03-15 DIAGNOSIS — E55.9 HYPOVITAMINOSIS D: ICD-10-CM

## 2019-03-15 RX ORDER — LEVOTHYROXINE SODIUM 100 UG/1
TABLET ORAL
Qty: 90 TAB | Refills: 0 | Status: SHIPPED | OUTPATIENT
Start: 2019-03-15 | End: 2019-06-12 | Stop reason: SDUPTHER

## 2019-03-15 NOTE — TELEPHONE ENCOUNTER
Pt called need a refill on levothyroxine.  Pt would like this sent to [facility] right source  [phone]

## 2019-03-15 NOTE — TELEPHONE ENCOUNTER
Synthroid last filled 09/05/18; Quantity of 90 with 1 refill. Patients last office visit 12/14/2018  No upcoming appointments scheduled.

## 2019-03-25 NOTE — TELEPHONE ENCOUNTER
Called the patient to set up an appointment, the patient stated that she was sick with the flu and would call back to set up an appointment.

## 2019-04-11 ENCOUNTER — OFFICE VISIT (OUTPATIENT)
Dept: PAIN MANAGEMENT | Age: 64
End: 2019-04-11

## 2019-04-11 VITALS
HEART RATE: 77 BPM | BODY MASS INDEX: 25.4 KG/M2 | DIASTOLIC BLOOD PRESSURE: 88 MMHG | SYSTOLIC BLOOD PRESSURE: 162 MMHG | HEIGHT: 62 IN | RESPIRATION RATE: 16 BRPM | WEIGHT: 138 LBS | TEMPERATURE: 97.3 F | OXYGEN SATURATION: 97 %

## 2019-04-11 DIAGNOSIS — M54.5 CHRONIC BILATERAL LOW BACK PAIN, WITH SCIATICA PRESENCE UNSPECIFIED: ICD-10-CM

## 2019-04-11 DIAGNOSIS — M51.37 DEGENERATION, INTERVERTEBRAL DISC, LUMBOSACRAL: ICD-10-CM

## 2019-04-11 DIAGNOSIS — G89.29 CHRONIC BILATERAL LOW BACK PAIN, WITH SCIATICA PRESENCE UNSPECIFIED: ICD-10-CM

## 2019-04-11 DIAGNOSIS — Z79.899 ENCOUNTER FOR LONG-TERM (CURRENT) USE OF HIGH-RISK MEDICATION: ICD-10-CM

## 2019-04-11 DIAGNOSIS — M96.1 LUMBAR POST-LAMINECTOMY SYNDROME: Primary | ICD-10-CM

## 2019-04-11 DIAGNOSIS — M46.1 BILATERAL SACROILIITIS (HCC): ICD-10-CM

## 2019-04-11 RX ORDER — TRAZODONE HYDROCHLORIDE 50 MG/1
50 TABLET ORAL
COMMUNITY
End: 2019-08-21

## 2019-04-11 RX ORDER — TRAMADOL HYDROCHLORIDE 50 MG/1
100 TABLET ORAL
Qty: 160 TAB | Refills: 0 | Status: SHIPPED | OUTPATIENT
Start: 2019-04-11 | End: 2019-05-09 | Stop reason: SDUPTHER

## 2019-04-11 RX ORDER — IPRATROPIUM BROMIDE 42 UG/1
2 SPRAY, METERED NASAL 3 TIMES DAILY
COMMUNITY

## 2019-04-11 RX ORDER — AZELASTINE HCL 205.5 UG/1
2 SPRAY NASAL 2 TIMES DAILY
COMMUNITY

## 2019-04-11 RX ORDER — ACETAMINOPHEN 500 MG
1000 TABLET ORAL
COMMUNITY
End: 2019-05-14 | Stop reason: SDUPTHER

## 2019-04-11 NOTE — PATIENT INSTRUCTIONS

## 2019-04-11 NOTE — PROGRESS NOTES
Re Worker's Compensation ferred  1995 chronic lower back and right lower extremity pain Pt was last seen here on  January 15, 2019 Calculated MEQ - up to 30 Naloxone rescue -yes Prophylactic bowel program -yes Date of last OCA  January 2019 Last UDS  January 4, 2019,findings consistent  checked today and findings were consistent. GIC-4 and 7 ROBERT -72% COMM- 7 
  
HPI:  
Jatin Raza  Is a 66 y.o. female here for f/u visit for ongoing evaluation of chronic lower back and right lower extremity pain. Pt denies interval changes in the character or distribution of pain. Pain is located at the lumbosacral junction as a stabbing pain and over bilateral sacroiliac joints is a stabbing pain through the gluteal region and overlying the lateral hips bilaterally. She has burning pain in bilateral gluteal regions and intermittent radiating symptoms in the right lower extremity down to the lateral foot described as aching and crushing. The pain  ranges from 5 to 7/10. History of L5-S1 fusion. --She receives her interventional care from Dr Alina Nick, [facility] and has repeat lumbar RFA scheduled for May 20th. She has responded positively to these procedures multiple times in the past. 
--Tylenol 500mg up to qid still working work  Methocarbamol still working well. --Tramadol 50 mg up to 3 times daily with 150 tablets for 30 days. Pt reports partial but incomplete analgesia with the current opioid regimen which helps to improve activity tolerance and function. Pt denies aberrant behaviors or any adverse effects. Most recent physical therapy was quite a while and this worked best when she was in aquatic therapy this was around 2017 ROS:Review of systems is negative for fever, chills, nausea, vomiting, diarrhea, constipation, chest pain, shortness of breath, abdominal pain, focal weakness, trouble swallowing, acute changes in vision, acute changes in hearing, dizziness, falls, depression, anxiety, suicidal ideation, homicidal ideation, alcohol use. Review of systems positive for  multiple recent illnesses which finally seem to be resolving. Imaging:  X-ray report of lumbar spine study done May 23, 2017 showed,\"\"\"\"\"\"\"\"\"\"\"\"\"\"\"\"\"\"\"\"FINDINGS:  
Posterior fixation L4-S1. No slade-hardware lucencies. No subluxation with 
flexion or extension. Mild degenerative spurring in the upper lumbar vertebra. No slade-hardware lucencies. No compression deformities. 
  
IMPRESSION IMPRESSION: 
  
1. No acute compression deformity. 2. No subluxation with flexion or extension\"\"\"\"\"\"\"\"\"\"\"\"\"\"\"\"\"\"\"\" Visit Vitals /88 (BP 1 Location: Left arm, BP Patient Position: Sitting) Pulse 77 Temp 97.3 °F (36.3 °C) (Oral) Resp 16 Ht 5' 2\" (1.575 m) Wt 62.6 kg (138 lb) SpO2 97% BMI 25.24 kg/m² PE: 
AFVSS with elevated blood pressure, no acute distress, normal body habitus. A&OXs 3. Speech is clear and appropriate. Mood is pleasant and appropriate. Patient is cooperative. Breathing is nonlabored Patient has reproduction of her primary pain with endrange lumbar flexion and interestingly has no reproduction of pain with active lumbar extension. Strength for right lower extremity is 5/5 for hip flexion, knee extension, dorsiflexion, extensor hallucis longus, 4/5 plantar flexion. Strength for left lower extremity is 5/5 for hip flexion, knee extension, dorsiflexion, extensor hallucis longus, plantar flexion. Muscle Stretch reflexes for right lower extremity are absent for L4, S1.  
Muscle Stretch reflexes for left  lower extremity are absent for L4, S1.  
Negative ankle clonus on the right and the left. Negative Hoffmans on the right and the left. Negative seated straight leg raise bilaterally. Positive FABERs on the right and negative on the left. Gait is within functional limits with mild antalgia and mildly decreased nadir. Balance is within functional limits. Sensation to light touch is intact for right L2-L5 and left L2-S2 but decreased sensation to light touch at right S1 and S2. 
  
  
Primary Care Physician Janice Hawkins, [address] [facility] [address] [facility] [address] [phone] PHQ -- . 
3 most recent PHQ Screens 4/11/2019 Little interest or pleasure in doing things Not at all Feeling down, depressed, irritable, or hopeless Not at all Total Score PHQ 2 0 Assessment/Plan:  
Encounter Diagnoses ICD-10-CM ICD-9-CM 1. Lumbar post-laminectomy syndrome M96.1 722.83 2. Degeneration, intervertebral disc, lumbosacral M51.37 722.52  
3. Chronic bilateral low back pain, with sciatica presence unspecified M54.5 724.2 G89.29 338.29  
4. Bilateral sacroiliitis (HCC) M46.1 720.2 5. Encounter for long-term (current) use of high-risk medication Z79.899 V58.69  
 
--I do not recommend long-term opioid therapy for this person's chronic pain. I believe the risks outweigh any potential benefits. We will pause the gradual opioid wean as the patient recovers from multiple back to back illnesses and is currently undergoing workup for newly discovered breast lump with a history of previous breast cancer. Patient was educated on signs and symptoms of opioid withdrawal and advised to call the clinic should these symptoms arise so that we may provide support as needed. --Provided with a refill of tramadol 50 mg 1-2 tablets as needed up to 3 times daily with a maximum of 6 tablets daily and 160 tablets to be budgeted over 30 days. Maintain this rate until next office visit as patient has ongoing workup for other medical conditions. When she returns for next office visit consider resuming opioid weaning. --Continue methocarbamol 
--Continue Tylenol 500-1000 mg milligrams daily as needed for pain.  
--Continue follow-up and treatment with Dr. Bernardino Gilford, as Dr. Bernardino Gilford to consider bilateral sacroiliac, piriformis, greater trochanteric injections as needed. Patient given educational handouts on sleep hygiene. --Patient advised to continue daily piriformis stretching bilaterally. Also advised to continue to investigate yoga for people with chronic pain or possibly dread chi. 
--Today we requested of bilateral sacroiliac orthotic to help provide stability and decreased pain to bilateral sacroiliac joints. --Follow-up  in 3 months with Edson/Jake/XI 
 
GOALS: 
To establish complementary and integrative plan of care to address chronic pain issues while minimizing pharmaceuticals to maximize patient's function improve quality of life. Education: 
Patient again educated on the importance of strict compliance with the opioid care agreement while on opioid therapy. Patient also again educated that they should avoid driving while on chronic opioid therapy. Also advised to avoid alcohol and to avoid benzodiazepines while on opioid therapy. A total of 34 minutes were spent with the patient, of which more than half of the time was spent counseling and/or coordinating care. F/u:. Follow-up Disposition: 
Follow-up and Dispositions · Return in about 3 months (around 7/11/2019) for 30 min.

## 2019-04-11 NOTE — PROGRESS NOTES
Nursing Notes Patient presents to the office today in follow-up. Patient rates her pain at 7/10 on the numerical pain scale. Reviewed medications with counts as follows:   
Rx Date filled Qty Dispensed Pill Count Last Dose Short Tramadol 50 mg  03/01/19 150 10 This a.m. no  
       
       
       
          
 reviewed YES Any aberrancies noted on  NO Last opioid agreement 01/04/19 Last urine drug screen 01/04/19 Comments: POC UDS was not performed in office today Any new labs or imaging since last appointment? YES. Pt had a CT scan done of her head for her sinuses. This was ordered by ENT Have you been to an emergency room (ER) or urgent care clinic since your last visit? NO Have you been hospitalized since your last visit? NO If yes, where, when, and reason for visit? Have you seen or consulted any other health care providers outside of the [address] [place] [address]  since your last visit? YES If yes, where, when, and reason for visit? ENT and Dr. Vernell Rodriguez -interventional 
Ms. Jj Hayden has a reminder for a \"due or due soon\" health maintenance. I have asked that she contact her primary care provider for follow-up on this health maintenance. 3 most recent PHQ Screens 4/11/2019 Little interest or pleasure in doing things Not at all Feeling down, depressed, irritable, or hopeless Not at all Total Score PHQ 2 0

## 2019-04-18 LAB — MAMMOGRAPHY, EXTERNAL: NORMAL

## 2019-05-07 ENCOUNTER — TELEPHONE (OUTPATIENT)
Dept: PAIN MANAGEMENT | Age: 64
End: 2019-05-07

## 2019-05-14 ENCOUNTER — TELEPHONE (OUTPATIENT)
Dept: PAIN MANAGEMENT | Age: 64
End: 2019-05-14

## 2019-05-14 DIAGNOSIS — Z79.899 ENCOUNTER FOR LONG-TERM (CURRENT) USE OF HIGH-RISK MEDICATION: ICD-10-CM

## 2019-05-14 DIAGNOSIS — M96.1 LUMBAR POST-LAMINECTOMY SYNDROME: Primary | ICD-10-CM

## 2019-05-14 DIAGNOSIS — M96.1 LUMBAR POST-LAMINECTOMY SYNDROME: ICD-10-CM

## 2019-05-14 RX ORDER — ACETAMINOPHEN 500 MG
1000 TABLET ORAL
Qty: 180 TAB | Refills: 2 | Status: SHIPPED | OUTPATIENT
Start: 2019-05-14 | End: 2019-07-09 | Stop reason: SDUPTHER

## 2019-05-14 RX ORDER — SENNOSIDES 8.6 MG/1
1 TABLET ORAL
Qty: 30 TAB | Refills: 2 | Status: SHIPPED | OUTPATIENT
Start: 2019-05-14 | End: 2019-07-09 | Stop reason: CLARIF

## 2019-05-14 RX ORDER — SENNOSIDES 8.6 MG/1
1 TABLET ORAL
Qty: 30 TAB | Refills: 2 | Status: SHIPPED | OUTPATIENT
Start: 2019-05-14 | End: 2019-05-14 | Stop reason: SDUPTHER

## 2019-05-14 RX ORDER — ACETAMINOPHEN 500 MG
1000 TABLET ORAL
Qty: 180 TAB | Refills: 2 | Status: SHIPPED | OUTPATIENT
Start: 2019-05-14 | End: 2019-05-14 | Stop reason: SDUPTHER

## 2019-05-14 NOTE — TELEPHONE ENCOUNTER
Tala Espinal has called requesting a refill of their medication, acetaminophen ES and senna plus. Last office visit date: 4/11/19 with Uli Lock, next 7/9/19 with Edson. [facility] [address] [address]

## 2019-06-12 NOTE — TELEPHONE ENCOUNTER
Requested Prescriptions     Pending Prescriptions Disp Refills    levothyroxine (SYNTHROID) 100 mcg tablet 90 Tab 0     Sig: TAKE 1 TABLET DAILY BEFORE BREAKFAST   . Pt wants this medication sent to the [facility]. She would also like a call once it is sent so she is aware.

## 2019-06-14 DIAGNOSIS — M46.1 BILATERAL SACROILIITIS (HCC): ICD-10-CM

## 2019-06-14 DIAGNOSIS — M51.37 DEGENERATION, INTERVERTEBRAL DISC, LUMBOSACRAL: ICD-10-CM

## 2019-06-14 DIAGNOSIS — M96.1 LUMBAR POST-LAMINECTOMY SYNDROME: Primary | ICD-10-CM

## 2019-06-14 RX ORDER — TRAMADOL HYDROCHLORIDE 50 MG/1
50-100 TABLET ORAL
Qty: 160 TAB | Refills: 0 | Status: SHIPPED | OUTPATIENT
Start: 2019-06-14 | End: 2019-07-09 | Stop reason: SDUPTHER

## 2019-06-14 RX ORDER — LEVOTHYROXINE SODIUM 100 UG/1
TABLET ORAL
Qty: 90 TAB | Refills: 0 | Status: SHIPPED | OUTPATIENT
Start: 2019-06-14 | End: 2019-08-20 | Stop reason: SDUPTHER

## 2019-06-14 NOTE — TELEPHONE ENCOUNTER
Wandy Acuña has called requesting a refill of their controlled medication, tramadol, for the management of back pain. Last office visit date: 4/11/19 with Kulwinder Rangel, next 7/9/19 with Edson  Last opioid care agreement 1/10/19  Last UDS was done 1/9/19    Date last  was pulled and reviewed : 6/14/19  Last fill date for medication was 5/14/19    Was the patient compliant when the above report was pulled? yes    Analgesia: 70%    Aberrancies: none    ADL's: yes    Adverse Reaction: constipation patient reports she takes Senna + with good results    Provider's last note and plan of care reviewed? yes  Request forwarded to provider for review.

## 2019-06-14 NOTE — TELEPHONE ENCOUNTER
Patient identified using two patient identifiers (name and ); patient advised prescriptions are ready for pick-up. Patient also informed  hours are Mon-Fri [id number]. Patient verbalized understanding.

## 2019-06-17 ENCOUNTER — TELEPHONE (OUTPATIENT)
Dept: PAIN MANAGEMENT | Age: 64
End: 2019-06-17

## 2019-06-17 DIAGNOSIS — M51.37 DEGENERATION, INTERVERTEBRAL DISC, LUMBOSACRAL: ICD-10-CM

## 2019-06-17 DIAGNOSIS — M96.1 LUMBAR POST-LAMINECTOMY SYNDROME: Primary | ICD-10-CM

## 2019-06-17 DIAGNOSIS — M46.1 BILATERAL SACROILIITIS (HCC): ICD-10-CM

## 2019-06-17 RX ORDER — AMOXICILLIN 250 MG
1 CAPSULE ORAL DAILY
Qty: 30 TAB | Refills: 0 | Status: SHIPPED | OUTPATIENT
Start: 2019-06-17 | End: 2019-07-09 | Stop reason: CLARIF

## 2019-06-17 RX ORDER — METHOCARBAMOL 750 MG/1
TABLET, FILM COATED ORAL
Qty: 90 TAB | Refills: 2 | Status: SHIPPED | OUTPATIENT
Start: 2019-06-17 | End: 2019-09-25 | Stop reason: SDUPTHER

## 2019-06-17 RX ORDER — AMOXICILLIN 250 MG
1 CAPSULE ORAL DAILY
Qty: 30 TAB | Refills: 0 | Status: SHIPPED | OUTPATIENT
Start: 2019-06-17 | End: 2019-06-17 | Stop reason: SDUPTHER

## 2019-06-17 RX ORDER — METHOCARBAMOL 750 MG/1
TABLET, FILM COATED ORAL
Qty: 90 TAB | Refills: 2 | Status: SHIPPED | OUTPATIENT
Start: 2019-06-17 | End: 2019-06-17 | Stop reason: SDUPTHER

## 2019-06-17 NOTE — TELEPHONE ENCOUNTER
Attempted to contact patient to inform them their prescriptions for their robaxin and senna plus was sent electronically to [facility] pharmacy, but patient did not answer the phone and had to leave a message for the patient to call office back. Clinic number provided.

## 2019-06-17 NOTE — TELEPHONE ENCOUNTER
Manny Gamino Foil I did not let you know the pharmacy. Needs to be [facility] [address] [facility] on [address] [place] [address] Road. For Robaxin and Senna Plus.  Thanks

## 2019-06-18 DIAGNOSIS — Z00.00 ANNUAL PHYSICAL EXAM: Primary | ICD-10-CM

## 2019-06-18 NOTE — TELEPHONE ENCOUNTER
Patient scheduled her physical for July 16th, and stated that she will come in the Friday before to get her labs done.

## 2019-07-09 ENCOUNTER — OFFICE VISIT (OUTPATIENT)
Dept: PAIN MANAGEMENT | Age: 64
End: 2019-07-09

## 2019-07-09 VITALS
WEIGHT: 138 LBS | TEMPERATURE: 98 F | HEART RATE: 81 BPM | RESPIRATION RATE: 16 BRPM | OXYGEN SATURATION: 98 % | HEIGHT: 62 IN | DIASTOLIC BLOOD PRESSURE: 95 MMHG | SYSTOLIC BLOOD PRESSURE: 159 MMHG | BODY MASS INDEX: 25.4 KG/M2

## 2019-07-09 DIAGNOSIS — M54.5 CHRONIC BILATERAL LOW BACK PAIN, WITH SCIATICA PRESENCE UNSPECIFIED: ICD-10-CM

## 2019-07-09 DIAGNOSIS — M51.37 DEGENERATION, INTERVERTEBRAL DISC, LUMBOSACRAL: Primary | ICD-10-CM

## 2019-07-09 DIAGNOSIS — G89.4 CHRONIC PAIN SYNDROME: ICD-10-CM

## 2019-07-09 DIAGNOSIS — M96.1 LUMBAR POST-LAMINECTOMY SYNDROME: ICD-10-CM

## 2019-07-09 DIAGNOSIS — G89.29 CHRONIC BILATERAL LOW BACK PAIN, WITH SCIATICA PRESENCE UNSPECIFIED: ICD-10-CM

## 2019-07-09 DIAGNOSIS — M46.1 BILATERAL SACROILIITIS (HCC): ICD-10-CM

## 2019-07-09 DIAGNOSIS — Z79.899 ENCOUNTER FOR LONG-TERM (CURRENT) USE OF HIGH-RISK MEDICATION: ICD-10-CM

## 2019-07-09 LAB
ALCOHOL UR POC: NORMAL
AMPHETAMINES UR POC: NEGATIVE
BARBITURATES UR POC: NORMAL
BENZODIAZEPINES UR POC: NORMAL
BUPRENORPHINE UR POC: NEGATIVE
CANNABINOIDS UR POC: NEGATIVE
CARISOPRODOL UR POC: NORMAL
COCAINE UR POC: NEGATIVE
FENTANYL UR POC: NORMAL
MDMA/ECSTASY UR POC: NORMAL
METHADONE UR POC: NEGATIVE
METHAMPHETAMINE UR POC: NORMAL
METHYLPHENIDATE UR POC: NORMAL
OPIATES UR POC: NEGATIVE
OXYCODONE UR POC: NEGATIVE
PHENCYCLIDINE UR POC: NORMAL
PROPOXYPHENE UR POC: NORMAL
TRAMADOL UR POC: NORMAL
TRICYCLICS UR POC: NORMAL

## 2019-07-09 RX ORDER — TRAMADOL HYDROCHLORIDE 50 MG/1
50-100 TABLET ORAL
Qty: 160 TAB | Refills: 0 | Status: SHIPPED | OUTPATIENT
Start: 2019-07-17 | End: 2019-08-16

## 2019-07-09 RX ORDER — METHYLPREDNISOLONE 4 MG/1
TABLET ORAL
Refills: 0 | COMMUNITY
Start: 2019-07-01 | End: 2019-08-21

## 2019-07-09 RX ORDER — ACETAMINOPHEN 500 MG
1000 TABLET ORAL
Qty: 180 TAB | Refills: 2 | Status: SHIPPED | OUTPATIENT
Start: 2019-07-09 | End: 2019-08-08

## 2019-07-09 RX ORDER — BENZOCAINE .13; .15; .5; 2 G/100G; G/100G; G/100G; G/100G
1 GEL ORAL
COMMUNITY

## 2019-07-09 RX ORDER — DICLOFENAC SODIUM 30 MG/G
2-4 GEL TOPICAL 2 TIMES DAILY
Qty: 100 G | Refills: 5 | Status: SHIPPED | OUTPATIENT
Start: 2019-07-09 | End: 2019-08-08

## 2019-07-09 RX ORDER — AMOXICILLIN 250 MG
2 CAPSULE ORAL DAILY
Qty: 60 TAB | Refills: 2 | Status: SHIPPED | OUTPATIENT
Start: 2019-07-09 | End: 2019-08-08

## 2019-07-09 RX ORDER — DICLOFENAC SODIUM 30 MG/G
2 GEL TOPICAL
COMMUNITY
End: 2019-07-09 | Stop reason: SDUPTHER

## 2019-07-09 NOTE — PROGRESS NOTES
Re Worker's Compensation ferred  1995 chronic lower back and right lower extremity pain      Calculated MEQ - 30  Naloxone rescue - yes  Prophylactic bowel program - yes  Date of last OCA 1/4/19  Last UDS today, consistent POC, pending confirmatory testing  Prior UDS 1/4/19, consistent   date checked today, findings consistent      Today   Last Visit  PGIC - 4 & 7  4 & 7  ROBERT - 70%  72%  COMM - 6  7      HPI:  Niall York is a 66 y.o. female here for f/u visit for ongoing evaluation of chronic lower back and right LE pain. Hx L5-S1 fusion. Pt was last seen here on 4/11/19. Pt denies interval changes on the character or distribution of pain. Pain is located lumbosacral junction described as stabbing and over bilateral SIJ through gluteal region and overlying both hips. Burning in bilateral gluteal regions with intermittent radiating symptoms in the RLE down to the lateral foot. Pain at its best is 4/10. Pain at its worse is 8/10. The pain is worsened by prolonged sitting, standing and walking. Symptoms are improved by tylenol, robaxin and tramadol. She receives interventional care from Dr Reymundo Field at [facility]. Most recent PT was quite a while and this worked best when she was in aquatic therapy this was around 2017. Since last visit, she had right lumbar RFA on 5/20/19 and left RFA on 7/1/19. Pt states she is having balance issues and is not sure if this is related to sinus problems she is currently experiencing or s/p procedure. She plans to see her PCP or UC about her sinuses this week. ROS:  Reports nausea, abdominal pain, constipation and dizziness. Denies fever, chills, vomiting, diarrhea, chest pain, shortness or breath/trouble breathing, weakness, trouble swallowing, changes in vision, changes in hearing, falls, bladder incontinence, bowel incontinence, depression, anxiety, suicidal ideations, homicidal ideations or alcohol use. Opioid specific risk: asthma and GERD.     Vitals: 07/09/19 1428 07/09/19 1444   BP: (!) 154/98 (!) 159/95   Pulse:  81   Resp:  16   Temp:  98 °F (36.7 °C)   TempSrc:  Oral   SpO2:  98%   Weight:  62.6 kg (138 lb)   Height:  5' 2\" (1.575 m)   PainSc:   6    PainLoc: Back    LMP: 08/28/2002        Physical exam:  AFVSS with elevated blood pressure, no acute distress, normal body habitus. A&OXs 3. Normocephalic, atraumatic. Conjugate gaze, clear sclerae. EOM intact. Speech is clear and appropriate. Mood is appropriate and patient is cooperative. Gait is mildly antalgic with mild decrease nadir but within functional limits. Balance is within functional limits. Non-labored breathing. Even rise of chest wall. AROM lumbar flexion decreased by ~25% with reproduction of primary pain. AROM lumbar extension decreased by ~25% without reproduction of primary pain. Strength for right lower extremity is 5/5 for hip flexion, knee extension, dorsiflexion, extensor hallucis longus, plantar flexion. Strength for left lower extremity is 5/5 for hip flexion, knee extension, dorsiflexion, extensor hallucis longus, plantar flexion. Sensation to light touch is intact for right L2-L5 and left L2-S2 and decreased sensation to light touch at right S1 and S2.  Negative seated straight leg raise bilaterally. TTP midline lumbar region and bilateral SIJ regions      Primary Care Physician  Disha Wynn  [address]      [facility] -- .  3 most recent [address] [facility] 7/9/2019   Little interest or pleasure in doing things Not at all   Feeling down, depressed, irritable, or hopeless Not at all   Total Score PHQ 2 0         Assessment/Plan:     ICD-10-CM ICD-9-CM    1. Degeneration, intervertebral disc, lumbosacral M51.37 722.52 traMADol (ULTRAM) 50 mg tablet      diclofenac (SOLARAZE) 3 % topical gel   2.  Chronic bilateral low back pain, with sciatica presence unspecified M54.5 724.2 diclofenac (SOLARAZE) 3 % topical gel    G89.29 338.29    3. Lumbar post-laminectomy syndrome M96.1 722.83 traMADol (ULTRAM) 50 mg tablet      acetaminophen (TYLENOL EXTRA STRENGTH) 500 mg tablet      diclofenac (SOLARAZE) 3 % topical gel   4. Bilateral sacroiliitis (HCC) M46.1 720.2 traMADol (ULTRAM) 50 mg tablet      diclofenac (SOLARAZE) 3 % topical gel   5. Chronic pain syndrome G89.4 338.4 diclofenac (SOLARAZE) 3 % topical gel   6. Encounter for long-term (current) use of high-risk medication Z79.899 V58.69 DRUG SCREEN      AMB POC DRUG SCREEN ()      senna-docusate (SENNA PLUS) 8.6-50 mg per tablet        --I do not recommend long term opioid therapy for this patient at this time for their chronic pain; the risks outweigh the potential benefits. Pt currently taking Tramadol 50mg, 1-2 tabs up to 3 times a day as needed with a total of 160 tabs to be budgeted over 30 days. Their MME is 30. --Today, we will pause the weaning of patients opioid medication as she recovers from multiple illnesses and is currently undergoing workup for newly discovered breast lump with a history of previous breast cancer. End goal of being opioid free, pending safety and compliance. Pt was educated on signs and symptoms of opioid withdrawal and advised to call the clinic should these symptoms arise so that we may provide support as needed. --Pt instructed to call 5-7 days before they run out of their medications for refill. --At next office visit, the plan is to provide patient with Tramadol 50mg, 1-2 tabs up to 3 times a day as needed with a total of 155 tabs to be budgeted over 30 days. If patient has difficulty with the wean or difficulty with cravings we will consider referral to mental health for ongoing assessment and treatment for opioid use disorder. --Continue Robaxin. --Continue diclofenac gel; refill provided today. --Senna-Plus ordered today as this is the only thing that helps with her opiate induced constipation.    --Continue optimizing your tylenol; max of 3g per 24 hour period. --Continue f/u with Dr Madie Taylor; consider B/L SIJ, piriformis and greater trochanter injections as needed. .  --Next visit, discuss if she has been performing daily piriformis stretching as previously recommended. --Next visit, follow up to see if she has investigated yoga for people with chronic pain or possibly dread chi.  --Next visit, follow up to see if she has obtained SIJ orthotic ordered last visit to help provide stability and decrease pain to bilateral SIJ. --Follow up ongoing assessment and ongoing development of integrative and comprehensive plan of care for chronic pain. Goals: To establish complementary and integrative plan of care to address chronic pain issues while minimizing pharmaceuticals to maximize patient's function improve quality of life. Education:  Patient again educated on the importance of strict compliance with the opioid care agreement while on opioid therapy. Patient also again educated that they should avoid driving while on chronic opioid therapy. Also advised to avoid alcohol and to avoid benzodiazepines while on opioid therapy. Handouts given regarding opioid safety. Patient Homework:  Continue to independently investigate yoga for persons with chronic pain. Follow-up and Dispositions    · Return in about 3 months (around 10/9/2019) for 30 min.               Social History     Socioeconomic History    Marital status:      Spouse name: Not on file    Number of children: Not on file    Years of education: Not on file    Highest education level: Not on file   Occupational History    Not on file   Social Needs    Financial resource strain: Not on file    Food insecurity:     Worry: Not on file     Inability: Not on file    Transportation needs:     Medical: Not on file     Non-medical: Not on file   Tobacco Use    Smoking status: Never Smoker    Smokeless tobacco: Never Used   Substance and Sexual Activity    Alcohol use: No    Drug use: No    Sexual activity: Not Currently     Comment: Last mentrual    Lifestyle    Physical activity:     Days per week: Not on file     Minutes per session: Not on file    Stress: Not on file   Relationships    Social connections:     Talks on phone: Not on file     Gets together: Not on file     Attends Restorationist service: Not on file     Active member of club or organization: Not on file     Attends meetings of clubs or organizations: Not on file     Relationship status: Not on file    Intimate partner violence:     Fear of current or ex partner: Not on file     Emotionally abused: Not on file     Physically abused: Not on file     Forced sexual activity: Not on file   Other Topics Concern    Not on file   Social History Narrative    Not on file     Family History   Problem Relation Age of Onset    Diabetes Mother     Heart Attack Mother     Heart Disease Mother         age 64-    [facility] Stroke Other         aunts    Hypertension Other         great grandparents    Diabetes Other         great grandmother     Allergies   Allergen Reactions    Adhesive Rash    Aspirin Other (comments) and Nausea and Vomiting     G6PD  G6PD  GI BLEED AND ULCER    Contrast Agent [Iodine] Rash     Allergic to CT Dye    Dilantin [Phenytoin Sodium Extended] Other (comments)     Difficulty waking    Iodinated Contrast- Oral And Iv Dye Rash     \"bumps on face\"    Lipitor [Atorvastatin] Other (comments)     PKU     Pcn [Penicillins] Hives and Rash     \"sores all over the body\"    Pt states as of 19 they are no longer allergic to penicillin per allergy testing.     Phenytoin Unknown (comments)     \"Trouble waking up\"    Sulfa (Sulfonamide Antibiotics) Rash and Nausea and Vomiting     G6PD  G6PD    Tegretol [Carbamazepine] Other (comments) and Vertigo     \"Trouble waking up\"  Difficulty waking up     Past Medical History:   Diagnosis Date    Anemia     Asthma     on allergy shots, no inhalaers    Back injury     Back pain     Breast cancer ([facility] [address] 12/6/2011    Dr. Mami Castillo; Dr. Mayda Aguilar easily     Carpal tunnel syndrome, right     EMG done only in left however    Chronic pain     pelvic pain    Constipation     G6PD (glucose 6 phosphatase deficiency)     Gastroparesis     GERD (gastroesophageal reflux disease)     acid reflux    H/O colonoscopy 3/15/2013    Dr. Lti Bernardo Hepatitis A     History of abscessed tooth     Hypertension     no mediacations, doctor is monitoring    Hyperthyroidism     Grave's- radiation    IBS (irritable bowel syndrome)     Lymphedema 2012    in her right underarm    Numbness     legs    Osteopenia 3/19/2013    Other and unspecified hyperlipidemia     PUD (peptic ulcer disease)     pyloric ulcer    Unspecified hypothyroidism      Past Surgical History:   Procedure Laterality Date    HX BREAST RECONSTRUCTION  2012    HX BREAST RECONSTRUCTION      HX CARPAL TUNNEL RELEASE      HX CYST INCISION AND DRAINAGE      tooth abscess    HX MASTECTOMY  2012    right side    HX MASTECTOMY Right     HX MOHS PROCEDURES      HX ORTHOPAEDIC      rotator cuff repair on left- Dr. Jesusita Nixon HX [address]  05/2016    decompression, spinal fusion    HX OTHER SURGICAL      spinal chord stimulator inssertion/ did not stay in     Current Outpatient Medications on File Prior to Visit   Medication Sig    budesonide (RHINOCORT AQUA) 32 mcg/actuation nasal spray 1 Spray by Nasal route.  methylPREDNISolone (MEDROL DOSEPACK) 4 mg tablet TAKE 6 TABLETS ON DAY 1 AS DIRECTED ON PACKAGE AND DECREASE BY 1 TAB EACH DAY FOR A TOTAL OF 6 DAYS    methocarbamol (ROBAXIN) 750 mg tablet TAKE 1 TAB BY MOUTH THREE (3) TIMES DAILY. AS NEEDED FOR MUSCLE SPASMS    levothyroxine (SYNTHROID) 100 mcg tablet TAKE 1 TABLET DAILY BEFORE BREAKFAST    traZODone (DESYREL) 50 mg tablet Take 50 mg by mouth nightly as needed.     ipratropium (ATROVENT) 0.03 % nasal spray 2 Sprays three (3) times daily.  azelastine (ASTEPRO) 0.15 % (205.5 mcg) 2 Sprays by Both Nostrils route two (2) times a day.  cholecalciferol, vitamin D3, (VITAMIN D3) 2,000 unit tab Take 2,000 Units by mouth daily.  ondansetron (ZOFRAN ODT) 8 mg disintegrating tablet Take 1 Tab by mouth every eight (8) hours as needed for Nausea.  Omega-3-DHA-EPA-Fish Oil 1,000 mg (120 mg-180 mg) cap Take 1 Cap by mouth daily.  ranitidine (ZANTAC) 150 mg tablet Take 150 mg by mouth daily as needed.  loratadine (CLARITIN) 10 mg tablet Take 10 mg by mouth daily as needed.  naloxone (NARCAN) 4 mg/actuation nasal spray Use 1 spray intranasally into 1 nostril as needed for opioid respiratory emergency only. (Patient taking differently: 1 [place] by IntraNASal route once as needed (pt states she has this at home). Use 1 spray intranasally into 1 nostril as needed for opioid respiratory emergency only.)    fluticasone (FLONASE) 50 mcg/actuation nasal spray 2 Sprays by Both Nostrils route daily.  pseudoephedrine ER (SUDAFED 24 HOUR) 240 mg Tb24 tablet Take 120 mg by mouth every twenty-four (24) hours. No current facility-administered medications on file prior to visit. [address] was used for portions of this report. Unintended errors may occur.

## 2019-07-09 NOTE — PATIENT INSTRUCTIONS

## 2019-07-09 NOTE — PROGRESS NOTES
Nursing Notes    Patient presents to the office today in follow-up. Patient rates her pain at 6/10 on the numerical pain scale. Reviewed medications with counts as follows:    Rx Date filled Qty Dispensed Pill Count Last Dose Short   Tramadol 50 mg tab 6/17/19 160 64 today no                                       reviewed YES  Any aberrancies noted on  NO  Last opioid agreement 1/4/19  Last urine drug screen today    Comments:     POC UDS was performed in office today per verbal order and correct read back from provider. Any new labs or imaging since last appointment? YES, RFA 7/1/19    Have you been to an emergency room (ER) or urgent care clinic since your last visit? NO            Have you been hospitalized since your last visit? NO     If yes, where, when, and reason for visit? Have you seen or consulted any other health care providers outside of the [address] [place] [address]  since your last visit? YES,      If yes, where, when, and reason for visit? Ms. Kaylyn Olivares has a reminder for a \"due or due soon\" health maintenance. I have asked that she contact her primary care provider for follow-up on this health maintenance.     3 most recent PHQ Screens 7/9/2019   Little interest or pleasure in doing things Not at all   Feeling down, depressed, irritable, or hopeless Not at all   Total Score PHQ 2 0

## 2019-07-10 ENCOUNTER — OFFICE VISIT (OUTPATIENT)
Dept: FAMILY MEDICINE CLINIC | Age: 64
End: 2019-07-10

## 2019-07-10 VITALS
SYSTOLIC BLOOD PRESSURE: 136 MMHG | OXYGEN SATURATION: 99 % | DIASTOLIC BLOOD PRESSURE: 83 MMHG | WEIGHT: 148 LBS | HEART RATE: 78 BPM | RESPIRATION RATE: 16 BRPM | HEIGHT: 62 IN | BODY MASS INDEX: 27.23 KG/M2 | TEMPERATURE: 98.1 F

## 2019-07-10 DIAGNOSIS — R35.0 URINARY FREQUENCY: ICD-10-CM

## 2019-07-10 DIAGNOSIS — N30.01 ACUTE CYSTITIS WITH HEMATURIA: ICD-10-CM

## 2019-07-10 DIAGNOSIS — J06.9 UPPER RESPIRATORY TRACT INFECTION, UNSPECIFIED TYPE: Primary | ICD-10-CM

## 2019-07-10 LAB
BILIRUB UR QL STRIP: NEGATIVE
GLUCOSE UR-MCNC: NEGATIVE MG/DL
KETONES P FAST UR STRIP-MCNC: NEGATIVE MG/DL
PH UR STRIP: 6 [PH] (ref 4.6–8)
PROT UR QL STRIP: NEGATIVE
SP GR UR STRIP: 1.01 (ref 1–1.03)
UA UROBILINOGEN AMB POC: NORMAL (ref 0.2–1)
URINALYSIS CLARITY POC: CLEAR
URINALYSIS COLOR POC: YELLOW
URINE BLOOD POC: NORMAL
URINE LEUKOCYTES POC: NORMAL
URINE NITRITES POC: NEGATIVE

## 2019-07-10 RX ORDER — AMOXICILLIN AND CLAVULANATE POTASSIUM 500; 125 MG/1; MG/1
1 TABLET, FILM COATED ORAL 2 TIMES DAILY
Qty: 20 TAB | Refills: 0 | Status: SHIPPED | OUTPATIENT
Start: 2019-07-10 | End: 2019-07-20

## 2019-07-10 RX ORDER — LANOLIN ALCOHOL/MO/W.PET/CERES
CREAM (GRAM) TOPICAL
COMMUNITY
End: 2019-10-15

## 2019-07-10 RX ORDER — AMOXICILLIN AND CLAVULANATE POTASSIUM 500; 125 MG/1; MG/1
1 TABLET, FILM COATED ORAL 2 TIMES DAILY
Qty: 20 TAB | Refills: 0 | Status: SHIPPED | OUTPATIENT
Start: 2019-07-10 | End: 2019-07-10 | Stop reason: SDUPTHER

## 2019-07-10 NOTE — PATIENT INSTRUCTIONS

## 2019-07-10 NOTE — PROGRESS NOTES
Assessment/Plan:    *Diagnoses and all orders for this visit:    1. Upper respiratory tract infection, unspecified type  -     amoxicillin-clavulanate (AUGMENTIN) 500-125 mg per tablet; Take 1 Tab by mouth two (2) times a day for 10 days. 2. Urinary frequency  -     AMB POC URINALYSIS DIP STICK AUTO W/O MICRO    3. Acute cystitis with hematuria  -     CULTURE, URINE; Future  -     amoxicillin-clavulanate (AUGMENTIN) 500-125 mg per tablet; Take 1 Tab by mouth two (2) times a day for 10 days. Will await culture. Will return in a few weeks for her PE. Will do labs today. The plan was discussed with the patient. The patient verbalized understanding and is in agreement with the plan. All medication potential side effects were discussed with the patient.    -------------------------------------------------------------------------------------------------------------------        Floresita Duke is a 66 y.o. female and presents with Urinary Retention; Sinus Pain; Ear Pain; and Urinary Frequency         Subjective:  Pt here for increased frequency and urgency with urination for the last few weeks. .    Has also had sinus pressure and congestion x 4 days, green mucus, + post nasal drainage,       ROS:  Review of Systems - Negative except as above        The problem list was updated as a part of today's visit.   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, T45.1X5A    Chest wall pain following surgery R07.89, G89.18    Renal insufficiency N28.9    Proteinuria R80.9    Chronic insomnia F51.04    Paroxysmal sleep G47.419    Encounter for long-term (current) use of high-risk medication Z79.899    Foraminal stenosis of lumbar region M99.83    Lumbar facet arthropathy M47.816    Lumbar post-laminectomy syndrome M96.1    Lumbar neuritis M54.16    Spasm of back muscles M62.830    Drug-induced constipation K59.03    Bilateral sacroiliitis (HCC) M46.1    Drug-induced nausea and vomiting R11.2, T50.905A    Irritable bowel syndrome with both constipation and diarrhea K58.2    Fibromyalgia M79.7    Anxiety F41.9    Depression F32.9    Hypersomnolence G47.10    Chronic pain syndrome G89.4    Recurrent depression (HCC) F33.9       The PSH, FH were reviewed. SH:  Social History     Tobacco Use    Smoking status: Never Smoker    Smokeless tobacco: Never Used   Substance Use Topics    Alcohol use: No    Drug use: No       Medications/Allergies:  Current Outpatient Medications on File Prior to Visit   Medication Sig Dispense Refill    melatonin 3 mg tablet Take  by mouth nightly.  budesonide (RHINOCORT AQUA) 32 mcg/actuation nasal spray 1 Spray by Nasal route.  methylPREDNISolone (MEDROL DOSEPACK) 4 mg tablet TAKE 6 TABLETS ON DAY 1 AS DIRECTED ON PACKAGE AND DECREASE BY 1 TAB EACH DAY FOR A TOTAL OF 6 DAYS  0    [START ON 7/17/2019] traMADol (ULTRAM) 50 mg tablet Take 1-2 Tabs by mouth three (3) times daily as needed for Pain (maximum of 6 tablets daily and 160 tablets to be budgeted over 30 days) for up to 30 days. Max Daily Amount: 300 mg. 160 Tab 0    senna-docusate (SENNA PLUS) 8.6-50 mg per tablet Take 2 Tabs by mouth daily for 30 days. 60 Tab 2    acetaminophen (TYLENOL EXTRA STRENGTH) 500 mg tablet Take 2 Tabs by mouth three (3) times daily as needed for Pain for up to 30 days.  180 Tab 2    diclofenac (SOLARAZE) 3 % topical gel Apply 2-4 g to affected area two (2) times a day for 30 days. As needed for joint pain. Apply to painful areas 100 g 5    methocarbamol (ROBAXIN) 750 mg tablet TAKE 1 TAB BY MOUTH THREE (3) TIMES DAILY. AS NEEDED FOR MUSCLE SPASMS 90 Tab 2    levothyroxine (SYNTHROID) 100 mcg tablet TAKE 1 TABLET DAILY BEFORE BREAKFAST 90 Tab 0    traZODone (DESYREL) 50 mg tablet Take 50 mg by mouth nightly as needed.  ipratropium (ATROVENT) 0.03 % nasal spray 2 Sprays three (3) times daily.  azelastine (ASTEPRO) 0.15 % (205.5 mcg) 2 Sprays by Both Nostrils route two (2) times a day.  naloxone (NARCAN) 4 mg/actuation nasal spray Use 1 spray intranasally into 1 nostril as needed for opioid respiratory emergency only. (Patient taking differently: 1 [place] by IntraNASal route once as needed (pt states she has this at home). Use 1 spray intranasally into 1 nostril as needed for opioid respiratory emergency only.) 2 Each 0    cholecalciferol, vitamin D3, (VITAMIN D3) 2,000 unit tab Take 2,000 Units by mouth daily.  ondansetron (ZOFRAN ODT) 8 mg disintegrating tablet Take 1 Tab by mouth every eight (8) hours as needed for Nausea. 30 Tab 2    Omega-3-DHA-EPA-Fish Oil 1,000 mg (120 mg-180 mg) cap Take 1 Cap by mouth daily.  ranitidine (ZANTAC) 150 mg tablet Take 150 mg by mouth daily as needed.  loratadine (CLARITIN) 10 mg tablet Take 10 mg by mouth daily as needed.  fluticasone (FLONASE) 50 mcg/actuation nasal spray 2 Sprays by Both Nostrils route daily. (Patient taking differently: 2 Sprays by Both Nostrils route two (2) times a day.) 1 Bottle 0    pseudoephedrine ER (SUDAFED 24 HOUR) 240 mg Tb24 tablet Take 120 mg by mouth every twenty-four (24) hours. No current facility-administered medications on file prior to visit.          Allergies   Allergen Reactions    Adhesive Rash    Aspirin Other (comments) and Nausea and Vomiting     G6PD  G6PD  GI BLEED AND ULCER    Contrast Agent [Iodine] Rash     Allergic to CT Dye    Dilantin [Phenytoin Sodium Extended] Other (comments)     Difficulty waking    Iodinated Contrast- Oral And Iv Dye Rash     \"bumps on face\"    Lipitor [Atorvastatin] Other (comments)     CPK    Phenytoin Unknown (comments)     \"Trouble waking up\"    Sulfa (Sulfonamide Antibiotics) Rash and Nausea and Vomiting     G6PD  G6PD    Tegretol [Carbamazepine] Other (comments) and Vertigo     \"Trouble waking up\"  Difficulty waking up         Health Maintenance:   Health Maintenance   Topic Date Due    Shingrix Vaccine Age 49> (1 of 2) 12/23/2007    MEDICARE YEARLY EXAM  10/19/2018    Influenza Age 9 to Adult  08/01/2019    PAP AKA CERVICAL CYTOLOGY  12/13/2019    BREAST CANCER SCRN MAMMOGRAM  02/28/2020    COLONOSCOPY  06/22/2021    Bone Densitometry (Dexa) Screening  12/23/2022    DTaP/Tdap/Td series (2 - Td) 04/01/2025    Hepatitis C Screening  Completed    Pneumococcal 0-64 years  Aged Out       Objective:  Visit Vitals  /83 (BP 1 Location: Left arm, BP Patient Position: Sitting)   Pulse 78   Temp 98.1 °F (36.7 °C) (Oral)   Resp 16   Ht 5' 2\" (1.575 m)   Wt 148 lb (67.1 kg)   LMP 08/28/2002   SpO2 99%   BMI 27.07 kg/m²          Nurses notes and VS reviewed.       Physical Examination: General appearance - alert, well appearing, and in no distress  Ears - bilateral TM's and external ear canals normal  Nose - sinus tenderness noted RT maxillary and ethmoid regions  Mouth - erythematous  Neck - supple, no significant adenopathy  Chest - clear to auscultation, no wheezes, rales or rhonchi, symmetric air entry  Heart - normal rate, regular rhythm, normal S1, S2, no murmurs, rubs, clicks or gallops  Abdomen - tenderness noted suprapubic regions          Labwork and Ancillary Studies:    CBC w/Diff  Lab Results   Component Value Date/Time    WBC 6.5 05/15/2018 12:00 AM    HGB 11.1 05/15/2018 12:00 AM    PLATELET 484 97/76/3017 12:00 AM         Basic Metabolic Profile  Lab Results   Component Value Date/Time    Sodium 144 05/15/2018 12:00 AM    Potassium 4.4 05/15/2018 12:00 AM    Chloride 100 05/15/2018 12:00 AM    CO2 22 05/15/2018 12:00 AM    Anion gap 8 11/24/2015 10:47 AM    Glucose 58 (L) 05/15/2018 12:00 AM    BUN 16 05/15/2018 12:00 AM    Creatinine 0.80 05/15/2018 12:00 AM    BUN/Creatinine ratio 20 05/15/2018 12:00 AM    GFR est AA 93 05/15/2018 12:00 AM    GFR est non-AA 80 05/15/2018 12:00 AM    Calcium 9.0 05/15/2018 12:00 AM         LFT  Lab Results   Component Value Date/Time    ALT (SGPT) 10 05/15/2018 12:00 AM    AST (SGOT) 18 05/15/2018 12:00 AM    Alk.  phosphatase 60 05/15/2018 12:00 AM    Bilirubin, direct 0.06 05/15/2018 12:00 AM    Bilirubin, total 0.2 05/15/2018 12:00 AM         Cholesterol  Lab Results   Component Value Date/Time    Cholesterol, total 240 (H) 05/15/2018 12:00 AM    HDL Cholesterol 50 05/15/2018 12:00 AM    LDL, calculated 152 (H) 05/15/2018 12:00 AM    Triglyceride 188 (H) 05/15/2018 12:00 AM    CHOL/HDL Ratio 5.9 (H) 11/24/2015 10:47 AM

## 2019-07-10 NOTE — PROGRESS NOTES
Ashok Harden is a 66 y.o. female (: 1955) presenting to address:    Chief Complaint   Patient presents with    Urinary Retention    Sinus Pain    Ear Pain    Urinary Frequency       Vitals:    07/10/19 1136   BP: 136/83   Pulse: 78   Resp: 16   Temp: 98.1 °F (36.7 °C)   TempSrc: Oral   SpO2: 99%   Weight: 148 lb (67.1 kg)   Height: 5' 2\" (1.575 m)   PainSc:   7   PainLoc: Face   LMP: 2002       Hearing/Vision:   No exam data present    Learning Assessment:     Learning Assessment 10/9/2017   PRIMARY LEARNER Patient   HIGHEST LEVEL OF EDUCATION - PRIMARY LEARNER  -   BARRIERS PRIMARY LEARNER -   CO-LEARNER CAREGIVER -   PRIMARY LANGUAGE ENGLISH   LEARNER PREFERENCE PRIMARY READING     LISTENING     PICTURES     VIDEOS     DEMONSTRATION   ANSWERED BY self   RELATIONSHIP SELF     Depression Screening:     3 most recent PHQ Screens 2019   Little interest or pleasure in doing things Not at all   Feeling down, depressed, irritable, or hopeless Not at all   Total Score PHQ 2 0     Fall Risk Assessment:     Fall Risk Assessment, last 12 mths 10/19/2018   Able to walk? Yes   Fall in past 12 months? No   Fall with injury? -   Number of falls in past 12 months -   Fall Risk Score -     Abuse Screening:     Abuse Screening Questionnaire 10/19/2018   Do you ever feel afraid of your partner? N   Are you in a relationship with someone who physically or mentally threatens you? N   Is it safe for you to go home? Y     Coordination of Care Questionaire:   1. Have you been to the ER, urgent care clinic since your last visit? Hospitalized since your last visit? NO    2. Have you seen or consulted any other health care providers outside of the [address] [place] [address] since your last visit? Include any pap smears or colon screening. NO    Advanced Directive:   1. Do you have an Advanced Directive? NO    2. Would you like information on Advanced Directives?  NO

## 2019-07-12 LAB
25(OH)D3+25(OH)D2 SERPL-MCNC: 25.7 NG/ML (ref 30–100)
ALBUMIN SERPL-MCNC: 4.3 G/DL (ref 3.6–4.8)
ALP SERPL-CCNC: 51 IU/L (ref 39–117)
ALT SERPL-CCNC: 29 IU/L (ref 0–32)
APPEARANCE UR: CLEAR
AST SERPL-CCNC: 24 IU/L (ref 0–40)
BACTERIA UR CULT: NO GROWTH
BASOPHILS # BLD AUTO: 0 X10E3/UL (ref 0–0.2)
BASOPHILS NFR BLD AUTO: 1 %
BILIRUB DIRECT SERPL-MCNC: 0.08 MG/DL (ref 0–0.4)
BILIRUB SERPL-MCNC: 0.3 MG/DL (ref 0–1.2)
BILIRUB UR QL STRIP: NEGATIVE
BUN SERPL-MCNC: 15 MG/DL (ref 8–27)
BUN/CREAT SERPL: 19 (ref 12–28)
CALCIUM SERPL-MCNC: 9.2 MG/DL (ref 8.7–10.3)
CHLORIDE SERPL-SCNC: 102 MMOL/L (ref 96–106)
CHOLEST SERPL-MCNC: 239 MG/DL (ref 100–199)
CO2 SERPL-SCNC: 24 MMOL/L (ref 20–29)
COLOR UR: YELLOW
CREAT SERPL-MCNC: 0.79 MG/DL (ref 0.57–1)
EOSINOPHIL # BLD AUTO: 0.1 X10E3/UL (ref 0–0.4)
EOSINOPHIL NFR BLD AUTO: 1 %
ERYTHROCYTE [DISTWIDTH] IN BLOOD BY AUTOMATED COUNT: 11.6 % (ref 12.3–15.4)
GLUCOSE SERPL-MCNC: 85 MG/DL (ref 65–99)
GLUCOSE UR QL: NEGATIVE
HCT VFR BLD AUTO: 37.1 % (ref 34–46.6)
HDLC SERPL-MCNC: 75 MG/DL
HGB BLD-MCNC: 11.6 G/DL (ref 11.1–15.9)
HGB UR QL STRIP: NEGATIVE
IMM GRANULOCYTES # BLD AUTO: 0 X10E3/UL (ref 0–0.1)
IMM GRANULOCYTES NFR BLD AUTO: 0 %
INTERPRETATION, 910389: NORMAL
KETONES UR QL STRIP: NEGATIVE
LDLC SERPL CALC-MCNC: 151 MG/DL (ref 0–99)
LEUKOCYTE ESTERASE UR QL STRIP: NEGATIVE
LYMPHOCYTES # BLD AUTO: 3.2 X10E3/UL (ref 0.7–3.1)
LYMPHOCYTES NFR BLD AUTO: 48 %
MCH RBC QN AUTO: 29 PG (ref 26.6–33)
MCHC RBC AUTO-ENTMCNC: 31.3 G/DL (ref 31.5–35.7)
MCV RBC AUTO: 93 FL (ref 79–97)
MICRO URNS: NORMAL
MONOCYTES # BLD AUTO: 0.6 X10E3/UL (ref 0.1–0.9)
MONOCYTES NFR BLD AUTO: 8 %
NEUTROPHILS # BLD AUTO: 2.8 X10E3/UL (ref 1.4–7)
NEUTROPHILS NFR BLD AUTO: 42 %
NITRITE UR QL STRIP: NEGATIVE
PH UR STRIP: 5.5 [PH] (ref 5–7.5)
PLATELET # BLD AUTO: 257 X10E3/UL (ref 150–450)
POTASSIUM SERPL-SCNC: 4.8 MMOL/L (ref 3.5–5.2)
PROT SERPL-MCNC: 7.1 G/DL (ref 6–8.5)
PROT UR QL STRIP: NEGATIVE
RBC # BLD AUTO: 4 X10E6/UL (ref 3.77–5.28)
SODIUM SERPL-SCNC: 141 MMOL/L (ref 134–144)
SP GR UR: 1.02 (ref 1–1.03)
T4 FREE SERPL-MCNC: 1.19 NG/DL (ref 0.82–1.77)
TRIGL SERPL-MCNC: 65 MG/DL (ref 0–149)
TSH SERPL DL<=0.005 MIU/L-ACNC: 2.08 UIU/ML (ref 0.45–4.5)
UROBILINOGEN UR STRIP-MCNC: 0.2 MG/DL (ref 0.2–1)
VLDLC SERPL CALC-MCNC: 13 MG/DL (ref 5–40)
WBC # BLD AUTO: 6.7 X10E3/UL (ref 3.4–10.8)

## 2019-07-22 ENCOUNTER — TELEPHONE (OUTPATIENT)
Dept: FAMILY MEDICINE CLINIC | Age: 64
End: 2019-07-22

## 2019-07-22 RX ORDER — DOXYCYCLINE 100 MG/1
100 TABLET ORAL 2 TIMES DAILY
Qty: 20 TAB | Refills: 0 | Status: SHIPPED | OUTPATIENT
Start: 2019-07-22 | End: 2019-08-01

## 2019-07-22 NOTE — TELEPHONE ENCOUNTER
Patient called and left urgent voicemails that the antibiotics that she was given have not helped her symptoms. Patient stated that she is feeling worse, and has \"nausea, right ear pain, feeling really sick, and having balance issues. \" Patient is requesting that a new round of antibiotics be sent in. She stated that she is still having UTI symptoms (urgency) because she is going to the restroom every 45 minutes. Please advise.

## 2019-07-22 NOTE — TELEPHONE ENCOUNTER
I will send in Doxycycline. As far as her urinary urgency, the culture showed that she does NOT have a UTI. Her urgency is from something else. She can see Urology for further evaluation if this continues to be an issue. If her sinus issue does not improve with 2nd round of Abx, I will want her evaluated by ENT.

## 2019-08-21 ENCOUNTER — OFFICE VISIT (OUTPATIENT)
Dept: FAMILY MEDICINE CLINIC | Age: 64
End: 2019-08-21

## 2019-08-21 VITALS
TEMPERATURE: 98.1 F | OXYGEN SATURATION: 99 % | RESPIRATION RATE: 16 BRPM | WEIGHT: 149 LBS | BODY MASS INDEX: 27.42 KG/M2 | HEIGHT: 62 IN | SYSTOLIC BLOOD PRESSURE: 145 MMHG | HEART RATE: 89 BPM | DIASTOLIC BLOOD PRESSURE: 86 MMHG

## 2019-08-21 DIAGNOSIS — R09.81 SINUS CONGESTION: ICD-10-CM

## 2019-08-21 DIAGNOSIS — E03.9 HYPOTHYROIDISM, UNSPECIFIED TYPE: ICD-10-CM

## 2019-08-21 DIAGNOSIS — R30.0 DYSURIA: ICD-10-CM

## 2019-08-21 DIAGNOSIS — E78.00 PURE HYPERCHOLESTEROLEMIA: Primary | ICD-10-CM

## 2019-08-21 DIAGNOSIS — Z78.0 POSTMENOPAUSAL: ICD-10-CM

## 2019-08-21 DIAGNOSIS — H69.81 EUSTACHIAN TUBE DYSFUNCTION, RIGHT: ICD-10-CM

## 2019-08-21 DIAGNOSIS — Z88.9 MULTIPLE ALLERGIES: ICD-10-CM

## 2019-08-21 DIAGNOSIS — Z23 ENCOUNTER FOR IMMUNIZATION: ICD-10-CM

## 2019-08-21 DIAGNOSIS — Z00.00 MEDICARE ANNUAL WELLNESS VISIT, SUBSEQUENT: ICD-10-CM

## 2019-08-21 RX ORDER — DICLOFENAC SODIUM 30 MG/G
GEL TOPICAL 2 TIMES DAILY
COMMUNITY
End: 2019-10-15 | Stop reason: SDUPTHER

## 2019-08-21 RX ORDER — TRAMADOL HYDROCHLORIDE 50 MG/1
100 TABLET ORAL
COMMUNITY
End: 2019-08-27 | Stop reason: SDUPTHER

## 2019-08-21 RX ORDER — ACETAMINOPHEN 500 MG
TABLET ORAL
COMMUNITY
End: 2019-11-26 | Stop reason: SDUPTHER

## 2019-08-21 NOTE — PROGRESS NOTES
This is the Subsequent Medicare Annual Wellness Exam, performed 12 months or more after the Initial AWV or the last Subsequent AWV    I have reviewed the patient's medical history in detail and updated the computerized patient record. History     Past Medical History:   Diagnosis Date    Anemia     Asthma     on allergy shots, no inhalaers    Back injury     Back pain     Breast cancer ([facility] [address] 12/6/2011    Dr. Moisés Lowery; Dr. Tae bolton     Carpal tunnel syndrome, right     EMG done only in left however    Chronic pain     pelvic pain    Constipation     G6PD (glucose 6 phosphatase deficiency)     Gastroparesis     GERD (gastroesophageal reflux disease)     acid reflux    H/O colonoscopy 3/15/2013    Dr. Lambert Herbert Hepatitis A     History of abscessed tooth     Hypertension     no mediacations, doctor is monitoring    Hyperthyroidism     Grave's- radiation    IBS (irritable bowel syndrome)     Lymphedema 2012    in her right underarm    Numbness     legs    Osteopenia 3/19/2013    Other and unspecified hyperlipidemia     PUD (peptic ulcer disease)     pyloric ulcer    Unspecified hypothyroidism       Past Surgical History:   Procedure Laterality Date    HX BREAST RECONSTRUCTION  2012    HX BREAST RECONSTRUCTION      HX CARPAL TUNNEL RELEASE      HX CYST INCISION AND DRAINAGE      tooth abscess    HX MASTECTOMY  2012    right side    HX MASTECTOMY Right     HX MOHS PROCEDURES      HX ORTHOPAEDIC      rotator cuff repair on left- Dr. Kamini King HX [address]  05/2016    decompression, spinal fusion    HX OTHER SURGICAL      spinal chord stimulator inssertion/ did not stay in     Current Outpatient Medications   Medication Sig Dispense Refill    traMADol (ULTRAM) 50 mg tablet Take 100 mg by mouth three (3) times daily as needed for Pain.  acetaminophen (TYLENOL EXTRA STRENGTH) 500 mg tablet Take  by mouth every six (6) hours as needed for Pain.       diclofenac (SOLARAZE) 3 % topical gel Apply  to affected area two (2) times a day.  sennosides/docusate sodium (SENNA PLUS PO) Take  by mouth. 2 tabs daily as needed      melatonin 3 mg tablet Take  by mouth nightly.  budesonide (RHINOCORT AQUA) 32 mcg/actuation nasal spray 1 Spray by Nasal route.  methocarbamol (ROBAXIN) 750 mg tablet TAKE 1 TAB BY MOUTH THREE (3) TIMES DAILY. AS NEEDED FOR MUSCLE SPASMS 90 Tab 2    levothyroxine (SYNTHROID) 100 mcg tablet TAKE 1 TABLET DAILY BEFORE BREAKFAST 90 Tab 0    ipratropium (ATROVENT) 42 mcg (0.06 %) nasal spray 2 Sprays three (3) times daily.  azelastine (ASTEPRO) 0.15 % (205.5 mcg) 2 Sprays by Both Nostrils route two (2) times a day.  naloxone (NARCAN) 4 mg/actuation nasal spray Use 1 spray intranasally into 1 nostril as needed for opioid respiratory emergency only. (Patient taking differently: 1 [place] by IntraNASal route once as needed (pt states she has this at home). Use 1 spray intranasally into 1 nostril as needed for opioid respiratory emergency only.) 2 Each 0    pseudoephedrine ER (SUDAFED 24 HOUR) 240 mg Tb24 tablet Take 120 mg by mouth every twenty-four (24) hours.  cholecalciferol, vitamin D3, (VITAMIN D3) 2,000 unit tab Take 2,000 Units by mouth daily.  ondansetron (ZOFRAN ODT) 8 mg disintegrating tablet Take 1 Tab by mouth every eight (8) hours as needed for Nausea. 30 Tab 2    Omega-3-DHA-EPA-Fish Oil 1,000 mg (120 mg-180 mg) cap Take 1 Cap by mouth daily.  ranitidine (ZANTAC) 150 mg tablet Take 150 mg by mouth daily as needed.  loratadine (CLARITIN) 10 mg tablet Take 10 mg by mouth daily as needed.        Allergies   Allergen Reactions    Adhesive Rash    Aspirin Other (comments) and Nausea and Vomiting     G6PD  G6PD  GI BLEED AND ULCER    Contrast Agent [Iodine] Rash     Allergic to CT Dye    Dilantin [Phenytoin Sodium Extended] Other (comments)     Difficulty waking    Iodinated Contrast Media Rash     \"bumps on face\"    Lipitor [Atorvastatin] Other (comments)     CPK    Phenytoin Unknown (comments)     \"Trouble waking up\"    Sulfa (Sulfonamide Antibiotics) Rash and Nausea and Vomiting     G6PD  G6PD    Tegretol [Carbamazepine] Other (comments) and Vertigo     \"Trouble waking up\"  Difficulty waking up     Family History   Problem Relation Age of Onset    Diabetes Mother     Heart Attack Mother     Heart Disease Mother         age 64-    Desai Stroke Other         aunts   Desai Hypertension Other         great grandparents   Desai Diabetes Other         great grandmother     Social History     Tobacco Use    Smoking status: Never Smoker    Smokeless tobacco: Never Used   Substance Use Topics    Alcohol use: No     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, T45.1X5A    Chest wall pain following surgery R07.89, G89.18    Renal insufficiency N28.9    Proteinuria R80.9    Chronic insomnia F51.04    Paroxysmal sleep G47.419    Encounter for long-term (current) use of high-risk medication Z79.899    Foraminal stenosis of lumbar region M99.83    Lumbar facet arthropathy M47.816    Lumbar post-laminectomy syndrome M96.1    Lumbar neuritis M54.16    Spasm of back muscles M62.830    Drug-induced constipation K59.03    Bilateral sacroiliitis (HCC) M46.1    Drug-induced nausea and vomiting R11.2, T50.905A    Irritable bowel syndrome with both constipation and diarrhea K58.2    Fibromyalgia M79.7    Anxiety F41.9    Depression F32.9    Hypersomnolence G47.10    Chronic pain syndrome G89.4    Recurrent depression (HCC) F33.9       Depression Risk Factor Screening:     3 most recent PHQ Screens 7/9/2019   Little interest or pleasure in doing things Not at all   Feeling down, depressed, irritable, or hopeless Not at all   Total Score PHQ 2 0     Alcohol Risk Factor Screening: You do not drink alcohol or very rarely. Functional Ability and Level of Safety:   Hearing Loss  Hearing is good. Activities of Daily Living  The home contains: no safety equipment. Patient does total self care    Fall Risk  Fall Risk Assessment, last 12 mths 10/19/2018   Able to walk? Yes   Fall in past 12 months? No   Fall with injury? -   Number of falls in past 12 months -   Fall Risk Score -       Abuse Screen  Patient is not abused    Cognitive Screening   Evaluation of Cognitive Function:  Has your family/caregiver stated any concerns about your memory: no  Normal    Patient Care Team   Patient Care Team:  Elida Horn MD as PCP - General (Internal Medicine)  Ciara Yoon MD as Consulting Provider (Neurology)  Jose Angel Flower MD as Consulting Provider (Plastic Surgery)  Javid Stover MD as Consulting Provider (Surgical Oncology)  David Waldrop MD (Nephrology)  Dorys Mcmullen MD as Physician (Gastroenterology)  Pierre De La Rosa MD as Physician (Psychiatry)  Abilio Ochoa MD (Pain Management)  Rupali Ortiz MD (Allergy)  Dotty Escalante MD (Otolaryngology)    Assessment/Plan   Education and counseling provided:  Are appropriate based on today's review and evaluation    Diagnoses and all orders for this visit:    1. Medicare annual wellness visit, subsequent    2. Postmenopausal  -     DEXA BONE DENSITY STUDY AXIAL; Future    3. Encounter for immunization  -     ADMIN INFLUENZA VIRUS VAC  -     INFLUENZA VIRUS VAC QUAD,SPLIT,PRESV FREE SYRINGE IM    4.  Dysuria  -     URINALYSIS W/ RFLX MICROSCOPIC; Future  -     CULTURE, URINE;  Future        Health Maintenance Due   Topic Date Due    Shingrix Vaccine Age 50> (1 of 2) 12/23/2007    MEDICARE YEARLY EXAM  10/19/2018    Influenza Age 9 to Adult  08/01/2019    PAP AKA CERVICAL CYTOLOGY  12/13/2019

## 2019-08-21 NOTE — PATIENT INSTRUCTIONS
Medicare Wellness Visit, Female     The best way to live healthy is to have a lifestyle where you eat a well-balanced diet, exercise regularly, limit alcohol use, and quit all forms of tobacco/nicotine, if applicable. Regular preventive services are another way to keep healthy. Preventive services (vaccines, screening tests, monitoring & exams) can help personalize your care plan, which helps you manage your own care. Screening tests can find health problems at the earliest stages, when they are easiest to treat. [facility] follows the current, evidence-based guidelines published by the [facility] ([facility]) when recommending preventive services for our patients. Because we follow these guidelines, sometimes recommendations change over time as research supports it. (For example, mammograms used to be recommended annually. Even though Medicare will still pay for an annual mammogram, the newer guidelines recommend a mammogram every two years for women of average risk.)  Of course, you and your doctor may decide to screen more often for some diseases, based on your risk and your health status. Preventive services for you include:  - Medicare offers their members a free annual wellness visit, which is time for you and your primary care provider to discuss and plan for your preventive service needs. Take advantage of this benefit every year!  -All adults over the age of 72 should receive the recommended pneumonia vaccines. Current USPSTF guidelines recommend a series of two vaccines for the best pneumonia protection.   -All adults should have a flu vaccine yearly and a tetanus vaccine every 10 years. All adults age 61 and older should receive a shingles vaccine once in their lifetime.    -A bone mass density test is recommended when a woman turns 65 to screen for osteoporosis. This test is only recommended one time, as a screening.  Some providers will use this same test as a disease monitoring tool if you already have osteoporosis. -All adults age 38-68 who are overweight should have a diabetes screening test once every three years.   -Other screening tests and preventive services for persons with diabetes include: an eye exam to screen for diabetic retinopathy, a kidney function test, a foot exam, and stricter control over your cholesterol.   -Cardiovascular screening for adults with routine risk involves an electrocardiogram (ECG) at intervals determined by your doctor.   -Colorectal cancer screenings should be done for adults age 54-65 with no increased risk factors for colorectal cancer. There are a number of acceptable methods of screening for this type of cancer. Each test has its own benefits and drawbacks. Discuss with your doctor what is most appropriate for you during your annual wellness visit. The different tests include: colonoscopy (considered the best screening method), a fecal occult blood test, a fecal DNA test, and sigmoidoscopy. -Breast cancer screenings are recommended every other year for women of normal risk, age 54-69.  -Cervical cancer screenings for women over age 72 are only recommended with certain risk factors.   -All adults born between [facility] should be screened once for Hepatitis C.      Here is a list of your current Health Maintenance items (your personalized list of preventive services) with a due date:  Health Maintenance Due   Topic Date Due    Shingles Vaccine (1 of 2) 12/23/2007    Annual Well Visit  10/19/2018    Flu Vaccine  08/01/2019    Pap Test  12/13/2019

## 2019-08-21 NOTE — PROGRESS NOTES
Jeff Ayala is a 66 y.o. female (: 1955) presenting to address:    Chief Complaint   Patient presents with    Complete Physical    Ear Pain     right side and goes under neck . Vitals:    19 1108   BP: 145/86   Pulse: 89   Resp: 16   Temp: 98.1 °F (36.7 °C)   TempSrc: Oral   SpO2: 99%   Weight: 149 lb (67.6 kg)   Height: 5' 2\" (1.575 m)   PainSc:   5   PainLoc: Back   LMP: 2002       Hearing/Vision:      Visual Acuity Screening    Right eye Left eye Both eyes   Without correction:      With correction: 20/30 20/40 20/30       Learning Assessment:     Learning Assessment 10/9/2017   PRIMARY LEARNER Patient   HIGHEST LEVEL OF EDUCATION - PRIMARY LEARNER  -   BARRIERS PRIMARY LEARNER -   CO-LEARNER CAREGIVER -   PRIMARY LANGUAGE ENGLISH   LEARNER PREFERENCE PRIMARY READING     LISTENING     PICTURES     VIDEOS     DEMONSTRATION   ANSWERED BY self   RELATIONSHIP SELF     Depression Screening:     3 most recent PHQ Screens 2019   Little interest or pleasure in doing things Not at all   Feeling down, depressed, irritable, or hopeless Not at all   Total Score PHQ 2 0     Fall Risk Assessment:     Fall Risk Assessment, last 12 mths 10/19/2018   Able to walk? Yes   Fall in past 12 months? No   Fall with injury? -   Number of falls in past 12 months -   Fall Risk Score -     Abuse Screening:     Abuse Screening Questionnaire 10/19/2018   Do you ever feel afraid of your partner? N   Are you in a relationship with someone who physically or mentally threatens you? N   Is it safe for you to go home? Y     Coordination of Care Questionaire:   1. Have you been to the ER, urgent care clinic since your last visit? Hospitalized since your last visit? NO    2. Have you seen or consulted any other health care providers outside of the [address] [place] [address] since your last visit? Include any pap smears or colon screening. NO    Advanced Directive:   1. Do you have an Advanced Directive? NO    2. Would you like information on Advanced Directives? NO    Flu shot Immunization/s administered 8/21/2019 by Jersey Cruz LPN with guardian's consent. Patient tolerated procedure well. No reactions noted.

## 2019-08-21 NOTE — PROGRESS NOTES
Assessment/Plan:    *Diagnoses and all orders for this visit:    1. Pure hypercholesterolemia    2. Postmenopausal  -     DEXA BONE DENSITY STUDY AXIAL; Future    3. Medicare annual wellness visit, subsequent    4. Encounter for immunization  -     ADMIN INFLUENZA VIRUS VAC  -     INFLUENZA VIRUS VAC QUAD,SPLIT,PRESV FREE SYRINGE IM    5. Dysuria  -     URINALYSIS W/ RFLX MICROSCOPIC; Future  -     CULTURE, URINE; Future    6. Sinus congestion    7. Eustachian tube dysfunction, right    8. Multiple allergies    9. Hypothyroidism, unspecified type        Pt will monitor BP at home and notify me if it does not come down to NL. Has no formal Dx of HTN. Pt was advised to call and see both her allergist and ENT. The plan was discussed with the patient. The patient verbalized understanding and is in agreement with the plan. All medication potential side effects were discussed with the patient.    -------------------------------------------------------------------------------------------------------------------        Floresita Duke is a 66 y.o. female and presents with Complete Physical and Ear Pain (right side and goes under neck . )         Subjective:  Pt has been having recurrent issue with URI infections for the last year or two. She comes today after being treated in July, with RT ear and sinus congestion. She feels like her ear is full of fluid and not draining. She has an ENT and an Allergist.  Is currently receiving allergy injection s q 6 weeks. I expressed my concerns about her taking Abxs so often and she understands this. HLD: elevated but she has not been able to exercise since she is \"always sick\". Hypothyroidism: asymptomatic. Having some non specific urinary symptoms. Has had UTIs in past and wishes to be checked for this. ROS:  Review of Systems - Negative except as above        The problem list was updated as a part of today's visit.   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, T45.1X5A    Chest wall pain following surgery R07.89, G89.18    Renal insufficiency N28.9    Proteinuria R80.9    Chronic insomnia F51.04    Paroxysmal sleep G47.419    Encounter for long-term (current) use of high-risk medication Z79.899    Foraminal stenosis of lumbar region M99.83    Lumbar facet arthropathy M47.816    Lumbar post-laminectomy syndrome M96.1    Lumbar neuritis M54.16    Spasm of back muscles M62.830    Drug-induced constipation K59.03    Bilateral sacroiliitis (HCC) M46.1    Drug-induced nausea and vomiting R11.2, T50.905A    Irritable bowel syndrome with both constipation and diarrhea K58.2    Fibromyalgia M79.7    Anxiety F41.9    Depression F32.9    Hypersomnolence G47.10    Chronic pain syndrome G89.4    Recurrent depression (HCC) F33.9       The PSH, FH were reviewed. SH:  Social History     Tobacco Use    Smoking status: Never Smoker    Smokeless tobacco: Never Used   Substance Use Topics    Alcohol use: No    Drug use: No       Medications/Allergies:  Current Outpatient Medications on File Prior to Visit   Medication Sig Dispense Refill    traMADol (ULTRAM) 50 mg tablet Take 100 mg by mouth three (3) times daily as needed for Pain.  acetaminophen (TYLENOL EXTRA STRENGTH) 500 mg tablet Take  by mouth every six (6) hours as needed for Pain.  diclofenac (SOLARAZE) 3 % topical gel Apply  to affected area two (2) times a day.       sennosides/docusate sodium (SENNA PLUS PO) Take  by mouth. 2 tabs daily as needed      melatonin 3 mg tablet Take  by mouth nightly.  budesonide (RHINOCORT AQUA) 32 mcg/actuation nasal spray 1 Spray by Nasal route.  methocarbamol (ROBAXIN) 750 mg tablet TAKE 1 TAB BY MOUTH THREE (3) TIMES DAILY. AS NEEDED FOR MUSCLE SPASMS 90 Tab 2    levothyroxine (SYNTHROID) 100 mcg tablet TAKE 1 TABLET DAILY BEFORE BREAKFAST 90 Tab 0    ipratropium (ATROVENT) 42 mcg (0.06 %) nasal spray 2 Sprays three (3) times daily.  azelastine (ASTEPRO) 0.15 % (205.5 mcg) 2 Sprays by Both Nostrils route two (2) times a day.  naloxone (NARCAN) 4 mg/actuation nasal spray Use 1 spray intranasally into 1 nostril as needed for opioid respiratory emergency only. (Patient taking differently: 1 [place] by IntraNASal route once as needed (pt states she has this at home). Use 1 spray intranasally into 1 nostril as needed for opioid respiratory emergency only.) 2 Each 0    pseudoephedrine ER (SUDAFED 24 HOUR) 240 mg Tb24 tablet Take 120 mg by mouth every twenty-four (24) hours.  cholecalciferol, vitamin D3, (VITAMIN D3) 2,000 unit tab Take 2,000 Units by mouth daily.  ondansetron (ZOFRAN ODT) 8 mg disintegrating tablet Take 1 Tab by mouth every eight (8) hours as needed for Nausea. 30 Tab 2    Omega-3-DHA-EPA-Fish Oil 1,000 mg (120 mg-180 mg) cap Take 1 Cap by mouth daily.  ranitidine (ZANTAC) 150 mg tablet Take 150 mg by mouth daily as needed.  loratadine (CLARITIN) 10 mg tablet Take 10 mg by mouth daily as needed. No current facility-administered medications on file prior to visit.          Allergies   Allergen Reactions    Adhesive Rash    Aspirin Other (comments) and Nausea and Vomiting     G6PD  G6PD  GI BLEED AND ULCER    Contrast Agent [Iodine] Rash     Allergic to CT Dye    Dilantin [Phenytoin Sodium Extended] Other (comments)     Difficulty waking    Iodinated Contrast Media Rash     \"bumps on face\"    Lipitor [Atorvastatin] Other (comments)     CPK    Phenytoin Unknown (comments)     \"Trouble waking up\"    Sulfa (Sulfonamide Antibiotics) Rash and Nausea and Vomiting     G6PD  G6PD    Tegretol [Carbamazepine] Other (comments) and Vertigo     \"Trouble waking up\"  Difficulty waking up         Health Maintenance:   Health Maintenance   Topic Date Due    Shingrix Vaccine Age 49> (1 of 2) 12/23/2007    MEDICARE YEARLY EXAM  10/19/2018    Influenza Age 9 to Adult  08/01/2019    PAP AKA CERVICAL CYTOLOGY  12/13/2019    BREAST CANCER SCRN MAMMOGRAM  04/18/2020    COLONOSCOPY  06/22/2021    Bone Densitometry (Dexa) Screening  12/23/2022    DTaP/Tdap/Td series (2 - Td) 04/01/2025    Hepatitis C Screening  Completed    Pneumococcal 0-64 years  Aged Out       Objective:  Visit Vitals  /86 (BP 1 Location: Left arm, BP Patient Position: Sitting)   Pulse 89   Temp 98.1 °F (36.7 °C) (Oral)   Resp 16   Ht 5' 2\" (1.575 m)   Wt 149 lb (67.6 kg)   LMP 08/28/2002   SpO2 99%   BMI 27.25 kg/m²          Nurses notes and VS reviewed.       Physical Examination: General appearance - alert, well appearing, and in no distress  Ears - bilateral TM's and external ear canals normal  Mouth - mucous membranes moist, pharynx normal without lesions  Neck - supple, no significant adenopathy  Chest - clear to auscultation, no wheezes, rales or rhonchi, symmetric air entry  Heart - normal rate, regular rhythm, normal S1, S2, no murmurs, rubs, clicks or gallops  Abdomen - soft, nontender, nondistended, no masses or organomegaly  Breasts - breasts appear normal, no suspicious masses, no skin or nipple changes or axillary nodes  Musculoskeletal - no joint tenderness, deformity or swelling  Extremities - peripheral pulses normal, no pedal edema, no clubbing or cyanosis        Labwork and Ancillary Studies:    CBC w/Diff  Lab Results   Component Value Date/Time    WBC 6.7 07/10/2019 12:00 AM    HGB 11.6 07/10/2019 12:00 AM PLATELET 774 61/98/2668 12:00 AM         Basic Metabolic Profile  Lab Results   Component Value Date/Time    Sodium 141 07/10/2019 12:00 AM    Potassium 4.8 07/10/2019 12:00 AM    Chloride 102 07/10/2019 12:00 AM    CO2 24 07/10/2019 12:00 AM    Anion gap 8 11/24/2015 10:47 AM    Glucose 85 07/10/2019 12:00 AM    BUN 15 07/10/2019 12:00 AM    Creatinine 0.79 07/10/2019 12:00 AM    BUN/Creatinine ratio 19 07/10/2019 12:00 AM    GFR est AA 93 07/10/2019 12:00 AM    GFR est non-AA 81 07/10/2019 12:00 AM    Calcium 9.2 07/10/2019 12:00 AM         LFT  Lab Results   Component Value Date/Time    ALT (SGPT) 29 07/10/2019 12:00 AM    AST (SGOT) 24 07/10/2019 12:00 AM    Alk.  phosphatase 51 07/10/2019 12:00 AM    Bilirubin, direct 0.08 07/10/2019 12:00 AM    Bilirubin, total 0.3 07/10/2019 12:00 AM         Cholesterol  Lab Results   Component Value Date/Time    Cholesterol, total 239 (H) 07/10/2019 12:00 AM    HDL Cholesterol 75 07/10/2019 12:00 AM    LDL, calculated 151 (H) 07/10/2019 12:00 AM    Triglyceride 65 07/10/2019 12:00 AM    CHOL/HDL Ratio 5.9 (H) 11/24/2015 10:47 AM

## 2019-08-23 LAB
APPEARANCE UR: CLEAR
BACTERIA UR CULT: NORMAL
BILIRUB UR QL STRIP: NEGATIVE
COLOR UR: YELLOW
GLUCOSE UR QL: NEGATIVE
HGB UR QL STRIP: NEGATIVE
KETONES UR QL STRIP: NEGATIVE
LEUKOCYTE ESTERASE UR QL STRIP: NEGATIVE
MICRO URNS: NORMAL
NITRITE UR QL STRIP: NEGATIVE
PH UR STRIP: 5.5 [PH] (ref 5–7.5)
PROT UR QL STRIP: NEGATIVE
SP GR UR: 1.02 (ref 1–1.03)
UROBILINOGEN UR STRIP-MCNC: 0.2 MG/DL (ref 0.2–1)

## 2019-08-27 DIAGNOSIS — M51.37 DEGENERATION, INTERVERTEBRAL DISC, LUMBOSACRAL: Primary | ICD-10-CM

## 2019-08-27 DIAGNOSIS — M96.1 LUMBAR POST-LAMINECTOMY SYNDROME: ICD-10-CM

## 2019-08-27 DIAGNOSIS — G89.4 CHRONIC PAIN SYNDROME: ICD-10-CM

## 2019-08-27 DIAGNOSIS — M54.5 CHRONIC BILATERAL LOW BACK PAIN, WITH SCIATICA PRESENCE UNSPECIFIED: ICD-10-CM

## 2019-08-27 DIAGNOSIS — G89.29 CHRONIC BILATERAL LOW BACK PAIN, WITH SCIATICA PRESENCE UNSPECIFIED: ICD-10-CM

## 2019-08-27 DIAGNOSIS — M46.1 BILATERAL SACROILIITIS (HCC): ICD-10-CM

## 2019-08-27 RX ORDER — TRAMADOL HYDROCHLORIDE 50 MG/1
100 TABLET ORAL
Qty: 160 TAB | Refills: 0 | Status: SHIPPED | OUTPATIENT
Start: 2019-08-27 | End: 2019-09-23 | Stop reason: SDUPTHER

## 2019-08-27 NOTE — TELEPHONE ENCOUNTER
Niall York has called requesting a refill of their controlled medication Ultram  for the management of chronic pain syndrome . Last office visit date: 07/09/19  Last opioid care agreement 01/10/19  Last UDS was done 07/09/19    Date last  was pulled and reviewed : 08/27/19  Last fill date for medication was 07/22/19    Was the patient compliant when the above report was pulled? yes    Analgesia: 40%    Aberrancies: no    ADL's: yes     Adverse Reaction: constipation and is taking medication for it. Provider's last note and plan of care reviewed? yes  Request forwarded to provider for review.

## 2019-09-12 ENCOUNTER — OFFICE VISIT (OUTPATIENT)
Dept: FAMILY MEDICINE CLINIC | Age: 64
End: 2019-09-12

## 2019-09-12 VITALS
SYSTOLIC BLOOD PRESSURE: 138 MMHG | DIASTOLIC BLOOD PRESSURE: 75 MMHG | WEIGHT: 151 LBS | HEART RATE: 89 BPM | RESPIRATION RATE: 16 BRPM | BODY MASS INDEX: 27.79 KG/M2 | TEMPERATURE: 98.1 F | OXYGEN SATURATION: 99 % | HEIGHT: 62 IN

## 2019-09-12 DIAGNOSIS — R05.9 COUGH: ICD-10-CM

## 2019-09-12 DIAGNOSIS — J20.8 ACUTE BRONCHITIS DUE TO OTHER SPECIFIED ORGANISMS: Primary | ICD-10-CM

## 2019-09-12 RX ORDER — AMOXICILLIN AND CLAVULANATE POTASSIUM 875; 125 MG/1; MG/1
1 TABLET, FILM COATED ORAL 2 TIMES DAILY
Qty: 20 TAB | Refills: 0 | Status: SHIPPED | OUTPATIENT
Start: 2019-09-12 | End: 2019-09-22

## 2019-09-12 NOTE — PROGRESS NOTES
Assessment/Plan:    *Diagnoses and all orders for this visit:    1. Acute bronchitis due to other specified organisms  -     amoxicillin-clavulanate (AUGMENTIN) 875-125 mg per tablet; Take 1 Tab by mouth two (2) times a day for 10 days. 2. Cough  -     XR CHEST PA LAT; Future        Will continue to use Sudafed as well. Will call if not better. The plan was discussed with the patient. The patient verbalized understanding and is in agreement with the plan. All medication potential side effects were discussed with the patient.    -------------------------------------------------------------------------------------------------------------------        Marcelina Maynard is a 66 y.o. female and presents with Chest Congestion (and burning in upper chest . coughing up alot of mucus ) and Sweats (at night . highest temp 99.6 )         Subjective:  Pt has been getting sick, has burning in chest, +phelgm with coughing, gagging, + post nasal drainage, +sweats, no temp. ROS:  Review of Systems - Negative except as above        The problem list was updated as a part of today's visit.   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, T45.1X5A    Chest wall pain following surgery R07.89, G89.18    Renal insufficiency N28.9    Proteinuria R80.9    Chronic insomnia F51.04    Paroxysmal sleep G47.419    Encounter for long-term (current) use of high-risk medication Z79.899    Foraminal stenosis of lumbar region M99.83    Lumbar facet arthropathy M47.816    Lumbar post-laminectomy syndrome M96.1    Lumbar neuritis M54.16    Spasm of back muscles M62.830    Drug-induced constipation K59.03    Bilateral sacroiliitis (HCC) M46.1    Drug-induced nausea and vomiting R11.2, T50.905A    Irritable bowel syndrome with both constipation and diarrhea K58.2    Fibromyalgia M79.7    Anxiety F41.9    Depression F32.9    Hypersomnolence G47.10    Chronic pain syndrome G89.4    Recurrent depression (HCC) F33.9       The PSH, FH were reviewed. SH:  Social History     Tobacco Use    Smoking status: Never Smoker    Smokeless tobacco: Never Used   Substance Use Topics    Alcohol use: No    Drug use: No       Medications/Allergies:  Current Outpatient Medications on File Prior to Visit   Medication Sig Dispense Refill    traMADol (ULTRAM) 50 mg tablet Take 2 Tabs by mouth three (3) times daily as needed for Pain for up to 30 days. Max Daily Amount: 300 mg. 160 Tab 0    levothyroxine (SYNTHROID) 100 mcg tablet TAKE 1 TABLET DAILY BEFORE BREAKFAST 90 Tab 1    acetaminophen (TYLENOL EXTRA STRENGTH) 500 mg tablet Take  by mouth every six (6) hours as needed for Pain.  diclofenac (SOLARAZE) 3 % topical gel Apply  to affected area two (2) times a day.  sennosides/docusate sodium (SENNA PLUS PO) Take  by mouth. 2 tabs daily as needed      melatonin 3 mg tablet Take  by mouth nightly.  budesonide (RHINOCORT AQUA) 32 mcg/actuation nasal spray 1 Spray by Nasal route.  methocarbamol (ROBAXIN) 750 mg tablet TAKE 1 TAB BY MOUTH THREE (3) TIMES DAILY. AS NEEDED FOR MUSCLE SPASMS 90 Tab 2    ipratropium (ATROVENT) 42 mcg (0.06 %) nasal spray 2 Sprays three (3) times daily.  azelastine (ASTEPRO) 0.15 % (205.5 mcg) 2 Sprays by Both Nostrils route two (2) times a day.  pseudoephedrine ER (SUDAFED 24 HOUR) 240 mg Tb24 tablet Take 120 mg by mouth every twenty-four (24) hours.       cholecalciferol, vitamin D3, (VITAMIN D3) 2,000 unit tab Take 2,000 Units by mouth daily.  ondansetron (ZOFRAN ODT) 8 mg disintegrating tablet Take 1 Tab by mouth every eight (8) hours as needed for Nausea. 30 Tab 2    Omega-3-DHA-EPA-Fish Oil 1,000 mg (120 mg-180 mg) cap Take 1 Cap by mouth daily.  ranitidine (ZANTAC) 150 mg tablet Take 150 mg by mouth daily as needed.  loratadine (CLARITIN) 10 mg tablet Take 10 mg by mouth daily as needed.  naloxone (NARCAN) 4 mg/actuation nasal spray Use 1 spray intranasally into 1 nostril as needed for opioid respiratory emergency only. (Patient taking differently: 1 [place] by IntraNASal route once as needed (pt states she has this at home). Use 1 spray intranasally into 1 nostril as needed for opioid respiratory emergency only.) 2 Each 0     No current facility-administered medications on file prior to visit.          Allergies   Allergen Reactions    Adhesive Rash    Aspirin Other (comments) and Nausea and Vomiting     G6PD  G6PD  GI BLEED AND ULCER    Contrast Agent [Iodine] Rash     Allergic to CT Dye    Dilantin [Phenytoin Sodium Extended] Other (comments)     Difficulty waking    Iodinated Contrast Media Rash     \"bumps on face\"    Lipitor [Atorvastatin] Other (comments)     CPK    Phenytoin Unknown (comments)     \"Trouble waking up\"    Sulfa (Sulfonamide Antibiotics) Rash and Nausea and Vomiting     G6PD  G6PD    Tegretol [Carbamazepine] Other (comments) and Vertigo     \"Trouble waking up\"  Difficulty waking up         Health Maintenance:   Health Maintenance   Topic Date Due    Shingrix Vaccine Age 50> (1 of 2) 12/23/2007    PAP AKA CERVICAL CYTOLOGY  12/13/2019    BREAST CANCER SCRN MAMMOGRAM  04/18/2020    MEDICARE YEARLY EXAM  08/21/2020    COLONOSCOPY  06/22/2021    Bone Densitometry (Dexa) Screening  12/23/2022    DTaP/Tdap/Td series (2 - Td) 04/01/2025    Hepatitis C Screening  Completed    Influenza Age 5 to Adult  Completed    Pneumococcal 0-64 years Aged Out       Objective:  Visit Vitals  /75 (BP 1 Location: Left arm, BP Patient Position: Sitting)   Pulse 89   Temp 98.1 °F (36.7 °C) (Oral)   Resp 16   Ht 5' 2\" (1.575 m)   Wt 151 lb (68.5 kg)   LMP 08/28/2002   SpO2 99%   BMI 27.62 kg/m²          Nurses notes and VS reviewed. Physical Examination: General appearance - ill-appearing  Ears - bilateral TM's and external ear canals normal  Nose - mucosal congestion  Mouth - erythematous  Neck - supple, no significant adenopathy  Chest - rhonchi noted scattered  Heart - normal rate and regular rhythm          Labwork and Ancillary Studies:    CBC w/Diff  Lab Results   Component Value Date/Time    WBC 6.7 07/10/2019 12:00 AM    HGB 11.6 07/10/2019 12:00 AM    PLATELET 641 96/59/8299 12:00 AM         Basic Metabolic Profile  Lab Results   Component Value Date/Time    Sodium 141 07/10/2019 12:00 AM    Potassium 4.8 07/10/2019 12:00 AM    Chloride 102 07/10/2019 12:00 AM    CO2 24 07/10/2019 12:00 AM    Anion gap 8 11/24/2015 10:47 AM    Glucose 85 07/10/2019 12:00 AM    BUN 15 07/10/2019 12:00 AM    Creatinine 0.79 07/10/2019 12:00 AM    BUN/Creatinine ratio 19 07/10/2019 12:00 AM    GFR est AA 93 07/10/2019 12:00 AM    GFR est non-AA 81 07/10/2019 12:00 AM    Calcium 9.2 07/10/2019 12:00 AM         LFT  Lab Results   Component Value Date/Time    ALT (SGPT) 29 07/10/2019 12:00 AM    AST (SGOT) 24 07/10/2019 12:00 AM    Alk.  phosphatase 51 07/10/2019 12:00 AM    Bilirubin, direct 0.08 07/10/2019 12:00 AM    Bilirubin, total 0.3 07/10/2019 12:00 AM         Cholesterol  Lab Results   Component Value Date/Time    Cholesterol, total 239 (H) 07/10/2019 12:00 AM    HDL Cholesterol 75 07/10/2019 12:00 AM    LDL, calculated 151 (H) 07/10/2019 12:00 AM    Triglyceride 65 07/10/2019 12:00 AM    CHOL/HDL Ratio 5.9 (H) 11/24/2015 10:47 AM

## 2019-09-12 NOTE — PROGRESS NOTES
Marcelina Maynard is a 66 y.o. female (: 1955) presenting to address:    Chief Complaint   Patient presents with    Chest Congestion     and burning in upper chest . coughing up alot of mucus     Sweats     at night . highest temp 99.6        Vitals:    19 0931   BP: 138/75   Pulse: 89   Resp: 16   Temp: 98.1 °F (36.7 °C)   TempSrc: Oral   SpO2: 99%   Weight: 151 lb (68.5 kg)   Height: 5' 2\" (1.575 m)   PainSc:   0 - No pain   LMP: 2002       Hearing/Vision:   No exam data present    Learning Assessment:     Learning Assessment 10/9/2017   PRIMARY LEARNER Patient   HIGHEST LEVEL OF EDUCATION - PRIMARY LEARNER  -   BARRIERS PRIMARY LEARNER -   CO-LEARNER CAREGIVER -   PRIMARY LANGUAGE ENGLISH   LEARNER PREFERENCE PRIMARY READING     LISTENING     PICTURES     VIDEOS     DEMONSTRATION   ANSWERED BY self   RELATIONSHIP SELF     Depression Screening:     3 most recent PHQ Screens 2019   Little interest or pleasure in doing things Not at all   Feeling down, depressed, irritable, or hopeless Not at all   Total Score PHQ 2 0     Fall Risk Assessment:     Fall Risk Assessment, last 12 mths 10/19/2018   Able to walk? Yes   Fall in past 12 months? No   Fall with injury? -   Number of falls in past 12 months -   Fall Risk Score -     Abuse Screening:     Abuse Screening Questionnaire 10/19/2018   Do you ever feel afraid of your partner? N   Are you in a relationship with someone who physically or mentally threatens you? N   Is it safe for you to go home? Y     Coordination of Care Questionaire:   1. Have you been to the ER, urgent care clinic since your last visit? Hospitalized since your last visit? NO    2. Have you seen or consulted any other health care providers outside of the [address] [place] [address] since your last visit? Include any pap smears or colon screening. NO    Advanced Directive:   1. Do you have an Advanced Directive? NO    2. Would you like information on Advanced Directives? NO

## 2019-09-12 NOTE — PATIENT INSTRUCTIONS
Cough: Care Instructions  Your Care Instructions    A cough is your body's response to something that bothers your throat or airways. Many things can cause a cough. You might cough because of a cold or the flu, bronchitis, or asthma. Smoking, postnasal drip, allergies, and stomach acid that backs up into your throat also can cause coughs. A cough is a symptom, not a disease. Most coughs stop when the cause, such as a cold, goes away. You can take a few steps at home to cough less and feel better. Follow-up care is a key part of your treatment and safety. Be sure to make and go to all appointments, and call your doctor if you are having problems. It's also a good idea to know your test results and keep a list of the medicines you take. How can you care for yourself at home? · Drink lots of water and other fluids. This helps thin the mucus and soothes a dry or sore throat. Honey or lemon juice in hot water or tea may ease a dry cough. · Take cough medicine as directed by your doctor. · Prop up your head on pillows to help you breathe and ease a dry cough. · Try cough drops to soothe a dry or sore throat. Cough drops don't stop a cough. Medicine-flavored cough drops are no better than candy-flavored drops or hard candy. · Do not smoke. Avoid secondhand smoke. If you need help quitting, talk to your doctor about stop-smoking programs and medicines. These can increase your chances of quitting for good. When should you call for help? Call 911 anytime you think you may need emergency care.  For example, call if:    · You have severe trouble breathing.    Call your doctor now or seek immediate medical care if:    · You cough up blood.     · You have new or worse trouble breathing.     · You have a new or higher fever.     · You have a new rash.    Watch closely for changes in your health, and be sure to contact your doctor if:    · You cough more deeply or more often, especially if you notice more mucus or a change in the color of your mucus.     · You have new symptoms, such as a sore throat, an earache, or sinus pain.     · You do not get better as expected. Where can you learn more? Go to http://celia-staci.info/. Enter D279 in the search box to learn more about \"Cough: Care Instructions. \"  Current as of: September 5, 2018  Content Version: 12.1  © 8797-3629 [employer]. Care instructions adapted under license by [employer] (which disclaims liability or warranty for this information). If you have questions about a medical condition or this instruction, always ask your healthcare professional. [address] any warranty or liability for your use of this information. Bronchitis: Care Instructions  Your Care Instructions    Bronchitis is inflammation of the bronchial tubes, which carry air to the lungs. The tubes swell and produce mucus, or phlegm. The mucus and inflamed bronchial tubes make you cough. You may have trouble breathing. Most cases of bronchitis are caused by viruses like those that cause colds. Antibiotics usually do not help and they may be harmful. Bronchitis usually develops rapidly and lasts about 2 to 3 weeks in otherwise healthy people. Follow-up care is a key part of your treatment and safety. Be sure to make and go to all appointments, and call your doctor if you are having problems. It's also a good idea to know your test results and keep a list of the medicines you take. How can you care for yourself at home? · Take all medicines exactly as prescribed. Call your doctor if you think you are having a problem with your medicine. · Get some extra rest.  · Take an over-the-counter pain medicine, such as acetaminophen (Tylenol), ibuprofen (Advil, Motrin), or naproxen (Aleve) to reduce fever and relieve body aches. Read and follow all instructions on the label.   · Do not take two or more pain medicines at the same time unless the doctor told you to. Many pain medicines have acetaminophen, which is Tylenol. Too much acetaminophen (Tylenol) can be harmful. · Take an over-the-counter cough medicine that contains dextromethorphan to help quiet a dry, hacking cough so that you can sleep. Avoid cough medicines that have more than one active ingredient. Read and follow all instructions on the label. · Breathe moist air from a humidifier, hot shower, or sink filled with hot water. The heat and moisture will thin mucus so you can cough it out. · Do not smoke. Smoking can make bronchitis worse. If you need help quitting, talk to your doctor about stop-smoking programs and medicines. These can increase your chances of quitting for good. When should you call for help? Call 911 anytime you think you may need emergency care. For example, call if:    · You have severe trouble breathing.    Call your doctor now or seek immediate medical care if:    · You have new or worse trouble breathing.     · You cough up dark brown or bloody mucus (sputum).     · You have a new or higher fever.     · You have a new rash.    Watch closely for changes in your health, and be sure to contact your doctor if:    · You cough more deeply or more often, especially if you notice more mucus or a change in the color of your mucus.     · You are not getting better as expected. Where can you learn more? Go to http://celia-staci.info/. Enter H333 in the search box to learn more about \"Bronchitis: Care Instructions. \"  Current as of: September 5, 2018  Content Version: 12.1  © 4434-3743 [employer]. Care instructions adapted under license by [employer] (which disclaims liability or warranty for this information). If you have questions about a medical condition or this instruction, always ask your healthcare professional. [address] any warranty or liability for your use of this information.

## 2019-09-19 ENCOUNTER — TELEPHONE (OUTPATIENT)
Dept: FAMILY MEDICINE CLINIC | Age: 64
End: 2019-09-19

## 2019-09-19 NOTE — TELEPHONE ENCOUNTER
Pt called returning a call for her xray resultsresults. The nurse was unavailable so I informed them of Dr Virginia Hines results. Pt voiced understanding however she is still experiencing the burning in her chest and excessive phlegm and would like to know what the next step she should do in regards to that.

## 2019-09-20 RX ORDER — METHYLPREDNISOLONE 4 MG/1
TABLET ORAL
Qty: 1 DOSE PACK | Refills: 0 | Status: SHIPPED | OUTPATIENT
Start: 2019-09-20 | End: 2019-10-15 | Stop reason: ALTCHOICE

## 2019-09-20 NOTE — TELEPHONE ENCOUNTER
Left a voicemail on the patients self identified voicemail, with Dr. Jenn De Souza recommendations. Left call back number for the patient if she has any questions.

## 2019-09-20 NOTE — TELEPHONE ENCOUNTER
We can try her on a round of steroids to try and treat what sounds like inflammation in her lungs. I have sent this to the pharmacy. After that, if not better, I would refer her on to pulmonary.

## 2019-09-23 DIAGNOSIS — M54.5 CHRONIC BILATERAL LOW BACK PAIN, WITH SCIATICA PRESENCE UNSPECIFIED: ICD-10-CM

## 2019-09-23 DIAGNOSIS — M96.1 LUMBAR POST-LAMINECTOMY SYNDROME: ICD-10-CM

## 2019-09-23 DIAGNOSIS — G89.4 CHRONIC PAIN SYNDROME: ICD-10-CM

## 2019-09-23 DIAGNOSIS — M46.1 BILATERAL SACROILIITIS (HCC): ICD-10-CM

## 2019-09-23 DIAGNOSIS — M51.37 DEGENERATION, INTERVERTEBRAL DISC, LUMBOSACRAL: ICD-10-CM

## 2019-09-23 DIAGNOSIS — G89.29 CHRONIC BILATERAL LOW BACK PAIN, WITH SCIATICA PRESENCE UNSPECIFIED: ICD-10-CM

## 2019-09-23 NOTE — TELEPHONE ENCOUNTER
Patient left a message [phone]  9:02am asking for refill of pain medication Mark Villa  [id number]        Medication - ultram    80%    ADL- YES    Side effects - has nausea but [facility] will not pay for script for her, she has constipation but she gets medication for this.

## 2019-09-24 RX ORDER — TRAMADOL HYDROCHLORIDE 50 MG/1
50-100 TABLET ORAL
Qty: 160 TAB | Refills: 0 | Status: SHIPPED | OUTPATIENT
Start: 2019-09-28 | End: 2019-10-15 | Stop reason: SDUPTHER

## 2019-09-25 DIAGNOSIS — M51.37 DEGENERATION, INTERVERTEBRAL DISC, LUMBOSACRAL: ICD-10-CM

## 2019-09-25 DIAGNOSIS — M96.1 LUMBAR POST-LAMINECTOMY SYNDROME: ICD-10-CM

## 2019-09-25 DIAGNOSIS — M46.1 BILATERAL SACROILIITIS (HCC): ICD-10-CM

## 2019-09-25 RX ORDER — METHOCARBAMOL 750 MG/1
TABLET, FILM COATED ORAL
Qty: 90 TAB | Refills: 2 | Status: SHIPPED | OUTPATIENT
Start: 2019-09-25 | End: 2019-10-15 | Stop reason: SDUPTHER

## 2019-10-08 ENCOUNTER — OFFICE VISIT (OUTPATIENT)
Dept: PAIN MANAGEMENT | Age: 64
End: 2019-10-08

## 2019-10-15 ENCOUNTER — OFFICE VISIT (OUTPATIENT)
Dept: PAIN MANAGEMENT | Age: 64
End: 2019-10-15

## 2019-10-15 VITALS
TEMPERATURE: 98.1 F | HEART RATE: 78 BPM | WEIGHT: 140 LBS | BODY MASS INDEX: 25.76 KG/M2 | HEIGHT: 62 IN | RESPIRATION RATE: 16 BRPM | SYSTOLIC BLOOD PRESSURE: 147 MMHG | OXYGEN SATURATION: 98 % | DIASTOLIC BLOOD PRESSURE: 88 MMHG

## 2019-10-15 DIAGNOSIS — M54.50 CHRONIC BILATERAL LOW BACK PAIN, UNSPECIFIED WHETHER SCIATICA PRESENT: ICD-10-CM

## 2019-10-15 DIAGNOSIS — G89.29 CHRONIC BILATERAL LOW BACK PAIN, UNSPECIFIED WHETHER SCIATICA PRESENT: ICD-10-CM

## 2019-10-15 DIAGNOSIS — G89.29 CHRONIC BILATERAL LOW BACK PAIN: ICD-10-CM

## 2019-10-15 DIAGNOSIS — M54.50 CHRONIC BILATERAL LOW BACK PAIN: ICD-10-CM

## 2019-10-15 DIAGNOSIS — M51.37 DEGENERATION, INTERVERTEBRAL DISC, LUMBOSACRAL: Primary | ICD-10-CM

## 2019-10-15 DIAGNOSIS — Z78.0 POSTMENOPAUSAL: ICD-10-CM

## 2019-10-15 DIAGNOSIS — Z79.899 ENCOUNTER FOR LONG-TERM (CURRENT) USE OF HIGH-RISK MEDICATION: ICD-10-CM

## 2019-10-15 DIAGNOSIS — M96.1 LUMBAR POST-LAMINECTOMY SYNDROME: ICD-10-CM

## 2019-10-15 DIAGNOSIS — G89.4 CHRONIC PAIN SYNDROME: ICD-10-CM

## 2019-10-15 DIAGNOSIS — M46.1 BILATERAL SACROILIITIS (HCC): ICD-10-CM

## 2019-10-15 RX ORDER — TRAMADOL HYDROCHLORIDE 50 MG/1
50-100 TABLET ORAL
Qty: 160 TAB | Refills: 0 | Status: SHIPPED | OUTPATIENT
Start: 2019-10-30 | End: 2019-11-26 | Stop reason: SDUPTHER

## 2019-10-15 RX ORDER — LEVOCETIRIZINE DIHYDROCHLORIDE 5 MG/1
TABLET, FILM COATED ORAL
COMMUNITY

## 2019-10-15 RX ORDER — METHOCARBAMOL 750 MG/1
TABLET, FILM COATED ORAL
Qty: 90 TAB | Refills: 2 | Status: SHIPPED | OUTPATIENT
Start: 2019-10-15 | End: 2019-11-26 | Stop reason: SDUPTHER

## 2019-10-15 RX ORDER — DICLOFENAC SODIUM 30 MG/G
GEL TOPICAL 2 TIMES DAILY
Qty: 100 G | Refills: 2 | Status: SHIPPED | OUTPATIENT
Start: 2019-10-15 | End: 2019-11-14

## 2019-10-15 NOTE — PATIENT INSTRUCTIONS

## 2019-10-15 NOTE — PROGRESS NOTES
Nursing Notes    Patient presents to the office today in follow-up. Patient rates her pain at 7/10 on the numerical pain scale. Reviewed medications with counts as follows:    Rx Date filled Qty Dispensed Pill Count Last Dose Short   Tramadol 50 mg 09/30/19 160  99 This am  no        reviewed YES  Any aberrancies noted on  NO  Last opioid agreement 01/04/19  Last urine drug screen 07/09/19    Comments:  Patient is here today for a follow up appt today for her chronic low back and right leg pain, she states her pain level today is a 7  PHQ 2 was done patient denies any depression. She states she has seen her pcm and labs were taken there, she has seen her ENT MD and Neurosurgeon also  Pt also has imaging with her that was ordered by her Neurosurgeon for her back, she will be having more imaging done in the future     POC UDS was not performed in office today    Any new labs or imaging since last appointment? YES    Have you been to an emergency room (ER) or urgent care clinic since your last visit? NO            Have you been hospitalized since your last visit? NO     If yes, where, when, and reason for visit? Have you seen or consulted any other health care providers outside of the [address] [place] [address]  since your last visit? YES   PCM, [facility], Neurosurgeon   If yes, where, when, and reason for visit? Ms. Claire Vital has a reminder for a \"due or due soon\" health maintenance. I have asked that she contact her primary care provider for follow-up on this health maintenance.

## 2019-10-16 NOTE — PROGRESS NOTES
[employer] fer[employer]  1993 chronic lower back and right lower extremity pain      Calculated MEQ - 30  Naloxone rescue - yes  Prophylactic bowel program - yes  Date of last OCA 1/4/19  Last UDS today, consistent  Prior UDS 1/4/19, consistent   date checked today, findings consistent      Today   Last Visit  Prior Visit(s)  PGIC - 4 & 7  4 & 7   4 & 7  ROBERT - 68%  70%   72%  COMM - 4  6   7      HPI:  Talon Lewis is a 66 y.o. female here for f/u visit for ongoing evaluation of chronic lower back and right LE pain. Hx L5-S1 fusion in 1991. Pt was last seen here on 7/9/19. Pt reports decreased sensation to her left LE described as numbness causing a fall on 8/20/19. She saw her neurosurgeon Dr Mercer Officer on 9/30/19 in which lumbar flexion/extension xray was done and MRI ordered. Pt states her MRI is scheduled for Friday 10/18/19. She is reporting new pain right above her normal chronic pain region over bilateral SIJ. Chronic pain is located lumbosacral junction described as stabbing and over bilateral SIJ through gluteal region and overlying both hips. Burning in bilateral gluteal regions with intermittent radiating symptoms in the RLE down to the lateral foot. Pain at its best is 4/10. Pain at its worse is 7/10. The pain is worsened by prolonged sitting, standing and walking. Symptoms are improved by tylenol, robaxin and tramadol. She receives interventional care from Dr Roland Tony at [facility]. Most recent PT was quite a while and this worked best when she was in aquatic therapy this was around 2017. Since last visit, she reports having URI, sinusitis and bronchitis treated with Augmentin and medrol dose pack. She has not researched yoga for individuals with chronic pain.  She has been performing daily piriformis stretching exercises as recommended and using the SIJ orthothic ordered in the past.    Interventional Pain Procedures:  7/1/19 - left lumbar RFA  5/20/19 - right lumbar RFA    ROS:  Reports resolved fever, chills, nausea, diarrhea and chest pain, shortness of breath, dizziness and changes in vision (all of which she attributes to recent URI, sinusitis and bronchitis and which have resolved sinus completing Augmentin and medrol dose pack). Reports constipation, weakness and recent fall. Denies vomiting, abdominal pain,, trouble swallowing, changes in hearing, bladder incontinence, bowel incontinence, depression, anxiety, suicidal ideations, homicidal ideations or alcohol use. Opioid specific risk: asthma and GERD. Vitals:    10/15/19 1322   BP: 147/88   Pulse: 78   Resp: 16   Temp: 98.1 °F (36.7 °C)   SpO2: 98%   Weight: 63.5 kg (140 lb)   Height: 5' 2\" (1.575 m)   PainSc:   7   PainLoc: Back   LMP: 08/28/2002        Imaging:  Pending lumbar MRI. Pending bending/flexion lumbar xray. Physical exam:  AFVSS with elevated blood pressure, no acute distress, normal body habitus. A&OXs 3. Normocephalic, atraumatic. Conjugate gaze, clear sclerae. EOM intact. Speech is clear and appropriate. Mood is appropriate and patient is cooperative. Gait is mildly antalgic with mild decrease nadir but within functional limits. Balance is within functional limits. Non-labored breathing. Even rise of chest wall. AROM lumbar flexion decreased by ~25% with reproduction of primary pain. AROM lumbar extension decreased by ~25% without reproduction of primary pain. Strength for right lower extremity is 5/5 for hip flexion, knee extension, dorsiflexion, extensor hallucis longus, plantar flexion. Strength for left lower extremity is 5/5 for hip flexion, knee extension, dorsiflexion, extensor hallucis longus, plantar flexion. Sensation to light touch is decreased for right L4, L5,S1 and S2. Sensation to light touch is intact for right L2 and L3. Sensation to light touch is intact for left L2-S2. Negative seated straight leg raise bilaterally.    Negative supine straight leg raise bilaterally. Positive Stinchfield's bilaterally. Negative SHERINE bilaterally. TTP midline lumbar region and bilateral SIJ regions      Primary Care Physician  Rudi White  [address] [facility]  [phone]      PHQ -- .  3 most recent [address] [facility] 10/15/2019   Little interest or pleasure in doing things Not at all   Feeling down, depressed, irritable, or hopeless Not at all   Total Score PHQ 2 0         Assessment/Plan:     ICD-10-CM ICD-9-CM    1. Degeneration, intervertebral disc, lumbosacral M51.37 722.52 methocarbamol (ROBAXIN) 750 mg tablet      diclofenac (SOLARAZE) 3 % topical gel      traMADol (ULTRAM) 50 mg tablet   2. Chronic bilateral low back pain M54.5 724.2 traMADol (ULTRAM) 50 mg tablet    G89.29 338.29    3. Bilateral sacroiliitis (HCC) M46.1 720.2 methocarbamol (ROBAXIN) 750 mg tablet      diclofenac (SOLARAZE) 3 % topical gel      traMADol (ULTRAM) 50 mg tablet   4. Lumbar post-laminectomy syndrome M96.1 722.83 methocarbamol (ROBAXIN) 750 mg tablet      diclofenac (SOLARAZE) 3 % topical gel      traMADol (ULTRAM) 50 mg tablet   5. Chronic pain syndrome G89.4 338.4 traMADol (ULTRAM) 50 mg tablet   6. Encounter for long-term (current) use of high-risk medication Z79.899 V58.69           1) Medications (opiod and non-opiod)  --I do not recommend long term opioid therapy for this patient at this time for their chronic pain; the risks outweigh the potential benefits. Pt currently taking Tramadol 50mg, 1-2 tabs up to 3 times a day as needed with a total of 160 tabs to be budgeted over 30 days. Their MME is 30. --Today, we will pause the weaning of patients opioid medication as she is having workup done by her neurosurgeon regarding new left LE decreased sensation and numbness. End goal of being opioid free, pending safety and compliance.  Pt was educated on signs and symptoms of opioid withdrawal and advised to call the clinic should these symptoms arise so that we may provide support as needed. --Pt instructed to call 5-7 days before they run out of their medications for refill. --At next office visit, the plan is to provide patient with Tramadol 50mg, 1-2 tabs up to 3 times a day as needed with a total of 155 tabs to be budgeted over 30 days. If patient has difficulty with the wean or difficulty with cravings we will consider referral to mental health for ongoing assessment and treatment for opioid use disorder. --Continue Robaxin and topical  diclofenac gel; refills provided today. --Continue Senna-Plus as this is the only thing that helps with her opiate induced constipation. --Continue optimizing tylenol; max of 3000mg of acetaminophen per 24 hour period. 2) Restorative Therapies  --Continue daily piriformis stretching exercises and HEP as recommended. --Continue to use SIJ orthotic as previously recommended to provide stability and decrease pain to bilateral SIJ. --I think she would significantly benefit from [facility] E-Stim device use; this was ordered for her today. --No perceived benefit from H-wave in office trial done previously. 3) Interventional Procedures  --Continue f/u with Dr Ewa Rodríguez; consider B/L SIJ, piriformis and greater trochanter injections as needed. 4) Behavorial Health Approaches  --Pt would benefit from referral to pain psychology for training and cognitive behavorial therapy, acceptance commitment therapy, biofeedback and mindfulness mediation and other modalities as indicated for treatment of chronic pain once this service is available at our clinic or by referral to Dr Vipin Frazier or Ciera Lombardo. 5) Complementary & Integrative Health  --Maintain close follow up with your neurosurgeon Dr Sudeep Troy regarding new left sided decreased sensation and numbness. Patient has lumbar MRI pending currently. --Follow up ongoing assessment and ongoing development of integrative and comprehensive plan of care for chronic pain. Goals:   To establish complementary and integrative plan of care to address chronic pain issues while minimizing pharmaceuticals to maximize patient's function improve quality of life. Education:  Patient again educated on the importance of strict compliance with the opioid care agreement while on opioid therapy. Patient also again educated that they should avoid driving while on chronic opioid therapy. Also advised to avoid alcohol and to avoid benzodiazepines while on opioid therapy. Handouts given regarding opioid safety. Patient Homework:  Continue to independently investigate yoga for persons with chronic pain. Follow-up and Dispositions    · Return in about 3 months (around 1/15/2020) for 30 min with Dr Tatum Gordon.               Social History     Socioeconomic History    Marital status:      Spouse name: Not on file    Number of children: Not on file    Years of education: Not on file    Highest education level: Not on file   Occupational History    Not on file   Social Needs    Financial resource strain: Not on file    Food insecurity:     Worry: Not on file     Inability: Not on file    Transportation needs:     Medical: Not on file     Non-medical: Not on file   Tobacco Use    Smoking status: Never Smoker    Smokeless tobacco: Never Used   Substance and Sexual Activity    Alcohol use: No    Drug use: No    Sexual activity: Not Currently     Comment: Last mentrual 2002   Lifestyle    Physical activity:     Days per week: Not on file     Minutes per session: Not on file    Stress: Not on file   Relationships    Social connections:     Talks on phone: Not on file     Gets together: Not on file     Attends Anglican service: Not on file     Active member of club or organization: Not on file     Attends meetings of clubs or organizations: Not on file     Relationship status: Not on file    Intimate partner violence:     Fear of current or ex partner: Not on file     Emotionally abused: Not on file Physically abused: Not on file     Forced sexual activity: Not on file   Other Topics Concern    Not on file   Social History Narrative    Not on file     Family History   Problem Relation Age of Onset    Diabetes Mother     Heart Attack Mother     Heart Disease Mother         age 64-    [de-identified] Stroke Other         aunts    Hypertension Other         great grandparents    Diabetes Other         great grandmother     Allergies   Allergen Reactions    Adhesive Rash    Aspirin Other (comments) and Nausea and Vomiting     G6PD  G6PD  GI BLEED AND ULCER    Contrast Agent [Iodine] Rash     Allergic to CT Dye    Dilantin [Phenytoin Sodium Extended] Other (comments)     Difficulty waking    Iodinated Contrast Media Rash     \"bumps on face\"    Lipitor [Atorvastatin] Other (comments)     CPK    Phenytoin Unknown (comments)     \"Trouble waking up\"    Sulfa (Sulfonamide Antibiotics) Rash and Nausea and Vomiting     G6PD  G6PD    Tegretol [Carbamazepine] Other (comments) and Vertigo     \"Trouble waking up\"  Difficulty waking up     Past Medical History:   Diagnosis Date    Anemia     Asthma     on allergy shots, no inhalaers    Back injury     Back pain     [facility] 2011    Dr. Dolores Mullins; Dr. Shaina Salazar easily     Carpal tunnel syndrome, right     EMG done only in left however    Chronic pain     pelvic pain    Constipation     G6PD (glucose 6 phosphatase deficiency)     Gastroparesis     GERD (gastroesophageal reflux disease)     acid reflux    H/O colonoscopy 3/15/2013    Dr. Ga Free Hepatitis A     History of abscessed tooth     Hypertension     no mediacations, doctor is monitoring    Hyperthyroidism     Grave's- radiation    IBS (irritable bowel syndrome)     Lymphedema     in her right underarm    Numbness     legs    Osteopenia 3/19/2013    Other and unspecified hyperlipidemia     PUD (peptic ulcer disease)     pyloric ulcer    Unspecified hypothyroidism      Past Surgical History:   Procedure Laterality Date    HX BREAST RECONSTRUCTION  2012    HX BREAST RECONSTRUCTION      HX CARPAL TUNNEL RELEASE      HX CYST INCISION AND DRAINAGE      tooth abscess    HX MASTECTOMY  2012    right side    HX MASTECTOMY Right     HX MOHS PROCEDURES      HX ORTHOPAEDIC      rotator cuff repair on left- Dr. Stephen Amaro HX ORTHOPAEDIC  05/2016    decompression, spinal fusion    HX OTHER SURGICAL      spinal chord stimulator inssertion/ did not stay in     Current Outpatient Medications on File Prior to Visit   Medication Sig    levocetirizine (XYZAL) 5 mg tablet Take  by mouth nightly.  levothyroxine (SYNTHROID) 100 mcg tablet TAKE 1 TABLET DAILY BEFORE BREAKFAST    acetaminophen (TYLENOL EXTRA STRENGTH) 500 mg tablet Take  by mouth every six (6) hours as needed for Pain.  sennosides/docusate sodium (SENNA PLUS PO) Take  by mouth. 2 tabs daily as needed    budesonide (RHINOCORT AQUA) 32 mcg/actuation nasal spray 1 Spray by Nasal route.  ipratropium (ATROVENT) 42 mcg (0.06 %) nasal spray 2 Sprays three (3) times daily.  azelastine (ASTEPRO) 0.15 % (205.5 mcg) 2 Sprays by Both Nostrils route two (2) times a day.  naloxone (NARCAN) 4 mg/actuation nasal spray Use 1 spray intranasally into 1 nostril as needed for opioid respiratory emergency only. (Patient taking differently: 1 [place] by IntraNASal route once as needed (pt states she has this at home). Use 1 spray intranasally into 1 nostril as needed for opioid respiratory emergency only.)    pseudoephedrine ER (SUDAFED 24 HOUR) 240 mg Tb24 tablet Take 120 mg by mouth every twenty-four (24) hours.  cholecalciferol, vitamin D3, (VITAMIN D3) 2,000 unit tab Take 2,000 Units by mouth daily.  ondansetron (ZOFRAN ODT) 8 mg disintegrating tablet Take 1 Tab by mouth every eight (8) hours as needed for Nausea.  Omega-3-DHA-EPA-Fish Oil 1,000 mg (120 mg-180 mg) cap Take 1 Cap by mouth daily.     ranitidine (ZANTAC) 150 mg tablet Take 150 mg by mouth daily as needed. No current facility-administered medications on file prior to visit. [address] was used for portions of this report. Unintended errors may occur.

## 2019-10-18 ENCOUNTER — DOCUMENTATION ONLY (OUTPATIENT)
Dept: PAIN MANAGEMENT | Age: 64
End: 2019-10-18

## 2019-10-18 NOTE — PROGRESS NOTES
Fatimah Mckeon from [facility] came in the office and I was able to give her the order for the Zynex machine for the pt.

## 2019-11-22 ENCOUNTER — DOCUMENTATION ONLY (OUTPATIENT)
Dept: PAIN MANAGEMENT | Age: 64
End: 2019-11-22

## 2019-11-22 NOTE — PROGRESS NOTES
Faxed [facility] Closure Letter to [address] [place] [address] to Fax# in [place] [phone] and confirmation received.

## 2019-11-25 ENCOUNTER — TELEPHONE (OUTPATIENT)
Dept: FAMILY MEDICINE CLINIC | Age: 64
End: 2019-11-25

## 2019-11-25 DIAGNOSIS — Z79.899 ENCOUNTER FOR LONG-TERM (CURRENT) USE OF HIGH-RISK MEDICATION: Primary | ICD-10-CM

## 2019-11-25 RX ORDER — PROMETHAZINE HYDROCHLORIDE 12.5 MG/1
12.5 TABLET ORAL
Qty: 12 TAB | Refills: 0 | Status: SHIPPED | OUTPATIENT
Start: 2019-11-25

## 2019-11-25 RX ORDER — HYDROXYZINE 25 MG/1
25 TABLET, FILM COATED ORAL
Qty: 12 TAB | Refills: 0 | Status: SHIPPED | OUTPATIENT
Start: 2019-11-25 | End: 2020-09-15 | Stop reason: ALTCHOICE

## 2019-11-25 RX ORDER — CLONIDINE HYDROCHLORIDE 0.1 MG/1
0.1 TABLET ORAL
Qty: 8 TAB | Refills: 0 | Status: SHIPPED | OUTPATIENT
Start: 2019-11-25

## 2019-11-25 NOTE — TELEPHONE ENCOUNTER
Returned the patients call, reached voicemail. Patient coming in for appt with Dr. Mansoor Cuellar today (11/25/19).

## 2019-11-25 NOTE — TELEPHONE ENCOUNTER
Patient called stating that she would a call back from piedad, patient wouldn't specify what the call was in regards to.

## 2019-11-26 ENCOUNTER — OFFICE VISIT (OUTPATIENT)
Dept: PAIN MANAGEMENT | Age: 64
End: 2019-11-26

## 2019-11-26 VITALS
SYSTOLIC BLOOD PRESSURE: 165 MMHG | OXYGEN SATURATION: 96 % | BODY MASS INDEX: 27.6 KG/M2 | RESPIRATION RATE: 18 BRPM | HEIGHT: 62 IN | TEMPERATURE: 98 F | HEART RATE: 75 BPM | WEIGHT: 150 LBS | DIASTOLIC BLOOD PRESSURE: 97 MMHG

## 2019-11-26 DIAGNOSIS — G89.4 CHRONIC PAIN SYNDROME: ICD-10-CM

## 2019-11-26 DIAGNOSIS — M54.50 CHRONIC BILATERAL LOW BACK PAIN: ICD-10-CM

## 2019-11-26 DIAGNOSIS — M46.1 BILATERAL SACROILIITIS (HCC): ICD-10-CM

## 2019-11-26 DIAGNOSIS — G89.29 CHRONIC BILATERAL LOW BACK PAIN: ICD-10-CM

## 2019-11-26 DIAGNOSIS — G89.29 CHRONIC BILATERAL LOW BACK PAIN, UNSPECIFIED WHETHER SCIATICA PRESENT: ICD-10-CM

## 2019-11-26 DIAGNOSIS — M51.37 DEGENERATION, INTERVERTEBRAL DISC, LUMBOSACRAL: Primary | ICD-10-CM

## 2019-11-26 DIAGNOSIS — M96.1 LUMBAR POST-LAMINECTOMY SYNDROME: ICD-10-CM

## 2019-11-26 DIAGNOSIS — M54.50 CHRONIC BILATERAL LOW BACK PAIN, UNSPECIFIED WHETHER SCIATICA PRESENT: ICD-10-CM

## 2019-11-26 DIAGNOSIS — Z79.899 ENCOUNTER FOR LONG-TERM (CURRENT) USE OF HIGH-RISK MEDICATION: ICD-10-CM

## 2019-11-26 RX ORDER — ONDANSETRON 8 MG/1
8 TABLET, ORALLY DISINTEGRATING ORAL
Qty: 30 TAB | Refills: 2 | Status: SHIPPED | OUTPATIENT
Start: 2019-11-26

## 2019-11-26 RX ORDER — AMOXICILLIN 250 MG
1 CAPSULE ORAL DAILY
Qty: 30 TAB | Refills: 2 | Status: SHIPPED | OUTPATIENT
Start: 2019-11-26 | End: 2019-12-26

## 2019-11-26 RX ORDER — METHOCARBAMOL 750 MG/1
TABLET, FILM COATED ORAL
Qty: 90 TAB | Refills: 2 | Status: SHIPPED | OUTPATIENT
Start: 2019-11-26

## 2019-11-26 RX ORDER — DICLOFENAC SODIUM 10 MG/G
2-4 GEL TOPICAL 4 TIMES DAILY
Qty: 100 G | Refills: 3 | Status: SHIPPED | OUTPATIENT
Start: 2019-11-26 | End: 2019-12-26

## 2019-11-26 RX ORDER — ACETAMINOPHEN 500 MG
500 TABLET ORAL
Qty: 120 TAB | Refills: 2 | Status: SHIPPED | OUTPATIENT
Start: 2019-11-26 | End: 2019-12-26

## 2019-11-26 RX ORDER — TRAMADOL HYDROCHLORIDE 50 MG/1
50-100 TABLET ORAL
Qty: 160 TAB | Refills: 0 | Status: SHIPPED | OUTPATIENT
Start: 2019-11-29 | End: 2019-12-29

## 2019-11-26 RX ORDER — DICLOFENAC SODIUM 10 MG/G
GEL TOPICAL 4 TIMES DAILY
COMMUNITY

## 2019-11-26 NOTE — PROGRESS NOTES
Referred  by [facility] Compensation 1995 chronic lower back and right lower extremity pain  Pt was last seen here on  10/15/2019  Calculated MEQ - 30  Naloxone rescue -yes  Prophylactic bowel program -yes  Date of last OCA  1/10/2019  Last UDS  7/9/2019,findings consistent.  checked today and findings were consistent     GIC-4 and 7  ROBERT -68%  COMM- 6     HPI: She has recently been seen for and is currently treating a bladder infection. Carol Lemus  Is a 66 y.o. female here for f/u visit for ongoing evaluation ofchronic lower back and right lower extremity pain. Pt denies interval changes in the character or distribution of pain. The pain is currently 7/10. Pain is located at the lumbosacral junction and bilateral sacroiliac regions as a stabbing pain and bilateral lateral hip regions as a burning pain she also has pain in the right lower extremity in the lateral calf and lateral foot described as aching to stabbing with pins-and-needles and crushing . The pain ranges from 4-8/10. Current/recent pain related medications include:  -Tylenol as needed  -Voltaren gel  -Robaxin 750 mg up to 3 times daily as needed  -Narcan rescue spray if needed  - Tramadol 100 mg up to 3 times daily with 160 tablets provided for 30 days  Pt reports partial but incomplete analgesia with the current opioid regimen which helps to improve activity tolerance and function. Pt denies aberrant behaviors or any adverse effects. ROS:Review of systems is negative for  vomiting, diarrhea,  chest pain, shortness of breath, , trouble swallowing, acute changes in vision, acute changes in hearing,  falls, depression, anxiety, suicidal ideation, homicidal ideation, alcohol use, bowel incontinence, bladder incontinence.   Review of systems positive for fever, chills, nausea,constipation,abdominal pain, weakness, dizziness,     Opioid specific risk: Gastroparesis, intermittent constipation, GERD, peptic ulcer disease, hypothyroidism, asthma       Visit Vitals  BP (!) 165/97 (BP 1 Location: Left arm, BP Patient Position: Sitting)   Pulse 75   Temp 98 °F (36.7 °C) (Oral)   Resp 18   Ht 5' 2\" (1.575 m)   Wt 68 kg (150 lb)   SpO2 96%   BMI 27.44 kg/m²        PE:  AFVSS with elevated blood pressure, no acute distress, normal body habitus. A&OXs 3. Speech is clear and appropriate. Mood is pleasant and appropriate. Patient is cooperative. Breathing is nonlabored. Conjugate gaze. Gait is within functional limits. Balance is within functional limits. Primary Care Physician  Danielle Spencer, [facility] [address] [address]  [phone]        hospitals 36 -- .  3 most recent [address] [place] [address],Third Floor 10/15/2019   Little interest or pleasure in doing things Not at all   Feeling down, depressed, irritable, or hopeless Not at all   Total Score PHQ 2 0            Assessment/Plan:   Encounter Diagnoses     ICD-10-CM ICD-9-CM   1. Degeneration, intervertebral disc, lumbosacral M51.37 722.52   2. Chronic bilateral low back pain, unspecified whether sciatica present M54.5 724.2    G89.29 338.29   3. Encounter for long-term (current) use of high-risk medication Z79.899 V58.69       Medication refills were provided for:  - Tylenol  - Diclofenac gel  - Methocarbamol  - Senna plus  - Zofran      Withdrawal cocktail provided:  Phenergan 25mg, one PO Q8hrs PRN nausea, vomiting #12  Atarax 25mg, one PO Q8hrs PRN anxiety, pruritus, irritation #12  Clonidine 0.1mg, one PO Q12hrs PRN sweating, restlessness, hot flashes #8     Today patient is provided with tramadol 50 mg tablets, take 1 to 2 tablets up to 3 times daily as needed with 160 tablets provided for 30 days. They were informed of the planned clinic closure of 12/12/19 and that this will be the last prescription of narcotic provided from this clinic. Patient was educated on signs and symptoms of opioid withdrto be used if needed.  They were also advised they can go to the nearest ED for further support if needed. They were referred to Dr. Devin Donato where she already treats for interventions, for continuing pain care. They were advised to call their primary care provider today to inform them of the planned clinic closure so that they may anticipate providing support for her pain while awaiting establishment at the new clinic. Patient was accompanied by her Worker's Compensation / who was also informed the planned closure and referral to the pain clinic of Dr. Devin Donato. Patient expresses understanding and agreement with the plan. --Follow-up with primary care provider today regarding elevated blood pressure and clinic closure. GOALS:  To establish complementary and integrative plan of care to address chronic pain issues while minimizing pharmaceuticals to maximize patient's function improve quality of life. Education:  Patient again educated on the importance of strict compliance with the opioid care agreement while on opioid therapy. Patient also again educated that they should avoid driving while on chronic opioid therapy. Also advised to avoid alcohol and to avoid benzodiazepines while on opioid therapy. A total of minutes were spent with the patient, of which more than half of the time was spent counseling and/or coordinating care. F/u:. Follow-up Disposition:

## 2019-11-26 NOTE — PROGRESS NOTES
Nursing Notes    Patient presents to the office today in follow-up. Patient rates her pain at 7/10 on the numerical pain scale. Reviewed medications with counts as follows:    Rx Date filled Qty Dispensed Pill Count Last Dose Short   Tramadol 50 mg  10/28/19 160 36 today no                                       reviewed YES  Any aberrancies noted on  NO  Last opioid agreement 01/10/19  Last urine drug screen 07/09/19    Comments:     POC UDS was not performed in office today    Any new labs or imaging since last appointment? NO    Have you been to an emergency room (ER) or urgent care clinic since your last visit? YES. Pt went to an urgent care yesterday for UTI            Have you been hospitalized since your last visit? NO     If yes, where, when, and reason for visit? Have you seen or consulted any other health care providers outside of the [address] [place] [address]  since your last visit? YES     If yes, where, when, and reason for visit? Allergist  Ms. Clive White has a reminder for a \"due or due soon\" health maintenance. I have asked that she contact her primary care provider for follow-up on this health maintenance.

## 2020-01-10 RX ORDER — LEVOTHYROXINE SODIUM 100 UG/1
TABLET ORAL
Qty: 90 TAB | Refills: 1 | Status: SHIPPED | OUTPATIENT
Start: 2020-01-10 | End: 2020-07-20

## 2020-04-30 ENCOUNTER — VIRTUAL VISIT (OUTPATIENT)
Dept: FAMILY MEDICINE CLINIC | Age: 65
End: 2020-04-30

## 2020-04-30 DIAGNOSIS — J06.9 UPPER RESPIRATORY TRACT INFECTION, UNSPECIFIED TYPE: Primary | ICD-10-CM

## 2020-04-30 DIAGNOSIS — E03.9 HYPOTHYROIDISM, UNSPECIFIED TYPE: ICD-10-CM

## 2020-04-30 DIAGNOSIS — E55.9 HYPOVITAMINOSIS D: ICD-10-CM

## 2020-04-30 DIAGNOSIS — E78.00 PURE HYPERCHOLESTEROLEMIA: ICD-10-CM

## 2020-04-30 NOTE — PROGRESS NOTES
Camila Carver is a 60 y.o. female who was seen by synchronous (real-time) audio-video technology on 4/30/2020. Consent: Camila Carver, who was seen by synchronous (real-time) audio-video technology, and/or her healthcare decision maker, is aware that this patient-initiated, Telehealth encounter on 4/30/2020 is a billable service, with coverage as determined by her insurance carrier. She is aware that she may receive a bill and has provided verbal consent to proceed: Yes. Assessment & Plan:   Diagnoses and all orders for this visit:    1. Upper respiratory tract infection, unspecified type    2. Hypothyroidism, unspecified type  -     TSH 3RD GENERATION; Future  -     T4, FREE; Future    3. Pure hypercholesterolemia  -     CBC WITH AUTOMATED DIFF; Future  -     HEPATIC FUNCTION PANEL; Future  -     LIPID PANEL; Future  -     METABOLIC PANEL, BASIC; Future    4. Hypovitaminosis D  -     VITAMIN D, 25 HYDROXY; Future      Pt will complete the Doxy and then see how she feels. Will call with any concerns or if she does not fully recover. Physical in Aug.  BW.    712  Subjective:   Camila Carver is a 60 y.o. female who was seen for Shortness of Breath      Pt was seen on a virtual visit today to f/u after a visit to the ER on Apr 24th. Prior to this, she had been under the care of her allergist for a sinusitis. Was placed on Augmentin for 10 days, then repeated 10 days. Then when she was still not better, they repeated Augmentin for 14 days more. Eight days into this, she felt she had gotten so much worse that she went to the hospital.  She was fearful to do so with the Covid pandemic. Her symptoms were chest pain, SOB, sinus drainage, chest tightness, posterior neck pain. They did a CXR and EKG which were normal.  A CTA chest ruled out pulmonary embolism. Her Covid test was negative. They changed her Abx to Doxy x 10 days. She is now 5 days into this and can already feel a difference.   Her SOB is much less and feels better. Prior to Admission medications    Medication Sig Start Date End Date Taking? Authorizing Provider   levothyroxine (SYNTHROID) 100 mcg tablet TAKE 1 TABLET DAILY BEFORE BREAKFAST 1/10/20   Luis Gomez MD   diclofenac (VOLTAREN) 1 % gel Apply  to affected area four (4) times daily. Provider, Historical   methocarbamol (ROBAXIN) 750 mg tablet TAKE 1 TAB BY MOUTH THREE (3) TIMES DAILY. AS NEEDED FOR MUSCLE SPASMS 11/26/19   Malik IBARRA, DO   ondansetron (ZOFRAN ODT) 8 mg disintegrating tablet Take 1 Tab by mouth every eight (8) hours as needed for Nausea. 11/26/19   Abraham Clark, DO   hydrOXYzine HCl (ATARAX) 25 mg tablet Take 1 Tab by mouth every eight (8) hours as needed for Itching (itching, irritation, anxiety - PRN opiate withdrawl) for up to 12 doses. 11/25/19   Malik IBARRA, DO   cloNIDine HCl (CATAPRES) 0.1 mg tablet Take 1 Tab by mouth every twelve (12) hours as needed (restlessness, hot flashes, sweats - PRN opiate withdrawl) for up to 8 doses. 11/25/19   Abraham Clark, DO   promethazine (PHENERGAN) 12.5 mg tablet Take 1 Tab by mouth every eight (8) hours as needed for Nausea (nausea, vomiting - opiate withdrawl PRN) for up to 12 doses. 11/25/19   Abraham Clark, DO   levocetirizine (XYZAL) 5 mg tablet Take  by mouth nightly. Provider, Historical   budesonide (RHINOCORT AQUA) 32 mcg/actuation nasal spray 1 [place] by Nasal route. Provider, Historical   ipratropium (ATROVENT) 42 mcg (0.06 %) nasal spray 2 Sprays three (3) times daily. Provider, Historical   azelastine (ASTEPRO) 0.15 % (205.5 mcg) 2 Sprays by Both Nostrils route two (2) times a day. Provider, Historical   naloxone (NARCAN) 4 mg/actuation nasal spray Use 1 spray intranasally into 1 nostril as needed for opioid respiratory emergency only. Patient taking differently: 1 [place]illion by IntraNASal route once as needed (pt states she has this at home).  Use 1 spray intranasally into 1 nostril as needed for opioid respiratory emergency only. 1/2/19   Ro Camacho NP   pseudoephedrine ER (SUDAFED 24 HOUR) 240 mg Tb24 tablet Take 120 mg by mouth every twenty-four (24) hours. Provider, Historical   cholecalciferol, vitamin D3, (VITAMIN D3) 2,000 unit tab Take 2,000 Units by mouth daily. Provider, Historical   Omega-3-DHA-EPA-Fish Oil 1,000 mg (120 mg-180 mg) cap Take 1 Cap by mouth daily. Provider, Historical   ranitidine (ZANTAC) 150 mg tablet Take 150 mg by mouth daily as needed.     Provider, Historical     Allergies   Allergen Reactions    Adhesive Rash    Aspirin Other (comments) and Nausea and Vomiting     G6PD  G6PD  GI BLEED AND ULCER    Contrast Agent [Iodine] Rash     Allergic to CT Dye    Dilantin [Phenytoin Sodium Extended] Other (comments)     Difficulty waking    Iodinated Contrast Media Rash     \"bumps on face\"    Lipitor [Atorvastatin] Other (comments)     CPK    Phenytoin Unknown (comments)     \"Trouble waking up\"    Sulfa (Sulfonamide Antibiotics) Rash and Nausea and Vomiting     G6PD  G6PD    Tegretol [Carbamazepine] Other (comments) and Vertigo     \"Trouble waking up\"  Difficulty waking up       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, T45.1X5A    Chest wall pain following surgery R07.89, G89.18    Renal insufficiency N28.9    Proteinuria R80.9    Chronic insomnia F51.04    Paroxysmal sleep G47.419    Encounter for long-term (current) use of high-risk medication Z79.556  Foraminal stenosis of lumbar region M48.061    Lumbar facet arthropathy M47.816    Lumbar post-laminectomy syndrome M96.1    Lumbar neuritis M54.16    Spasm of back muscles M62.830    Drug-induced constipation K59.03    Bilateral sacroiliitis (HCC) M46.1    Drug-induced nausea and vomiting R11.2, T50.905A    Irritable bowel syndrome with both constipation and diarrhea K58.2    Fibromyalgia M79.7    Anxiety F41.9    Depression F32.9    Hypersomnolence G47.10    Chronic pain syndrome G89.4    Recurrent depression (HCC) F33.9     Current Outpatient Medications   Medication Sig Dispense Refill    levothyroxine (SYNTHROID) 100 mcg tablet TAKE 1 TABLET DAILY BEFORE BREAKFAST 90 Tab 1    diclofenac (VOLTAREN) 1 % gel Apply  to affected area four (4) times daily.  methocarbamol (ROBAXIN) 750 mg tablet TAKE 1 TAB BY MOUTH THREE (3) TIMES DAILY. AS NEEDED FOR MUSCLE SPASMS 90 Tab 2    ondansetron (ZOFRAN ODT) 8 mg disintegrating tablet Take 1 Tab by mouth every eight (8) hours as needed for Nausea. 30 Tab 2    hydrOXYzine HCl (ATARAX) 25 mg tablet Take 1 Tab by mouth every eight (8) hours as needed for Itching (itching, irritation, anxiety - PRN opiate withdrawl) for up to 12 doses. 12 Tab 0    cloNIDine HCl (CATAPRES) 0.1 mg tablet Take 1 Tab by mouth every twelve (12) hours as needed (restlessness, hot flashes, sweats - PRN opiate withdrawl) for up to 8 doses. 8 Tab 0    promethazine (PHENERGAN) 12.5 mg tablet Take 1 Tab by mouth every eight (8) hours as needed for Nausea (nausea, vomiting - opiate withdrawl PRN) for up to 12 doses. 12 Tab 0    levocetirizine (XYZAL) 5 mg tablet Take  by mouth nightly.  budesonide (RHINOCORT AQUA) 32 mcg/actuation nasal spray 1 Spray by Nasal route.  ipratropium (ATROVENT) 42 mcg (0.06 %) nasal spray 2 Sprays three (3) times daily.  azelastine (ASTEPRO) 0.15 % (205.5 mcg) 2 Sprays by Both Nostrils route two (2) times a day.       naloxone [facility]) 4 mg/actuation nasal spray Use 1 spray intranasally into 1 nostril as needed for opioid respiratory emergency only. (Patient taking differently: 1 [place] by IntraNASal route once as needed (pt states she has this at home). Use 1 spray intranasally into 1 nostril as needed for opioid respiratory emergency only.) 2 Each 0    pseudoephedrine ER (SUDAFED 24 HOUR) 240 mg Tb24 tablet Take 120 mg by mouth every twenty-four (24) hours.  cholecalciferol, vitamin D3, (VITAMIN D3) 2,000 unit tab Take 2,000 Units by mouth daily.  Omega-3-DHA-EPA-Fish Oil 1,000 mg (120 mg-180 mg) cap Take 1 Cap by mouth daily.  ranitidine (ZANTAC) 150 mg tablet Take 150 mg by mouth daily as needed.        Allergies   Allergen Reactions    Adhesive Rash    Aspirin Other (comments) and Nausea and Vomiting     G6PD  G6PD  GI BLEED AND ULCER    Contrast Agent [Iodine] Rash     Allergic to CT Dye    Dilantin [Phenytoin Sodium Extended] Other (comments)     Difficulty waking    Iodinated Contrast Media Rash     \"bumps on face\"    Lipitor [Atorvastatin] Other (comments)     CPK    Phenytoin Unknown (comments)     \"Trouble waking up\"    Sulfa (Sulfonamide Antibiotics) Rash and Nausea and Vomiting     G6PD  G6PD    Tegretol [Carbamazepine] Other (comments) and Vertigo     \"Trouble waking up\"  Difficulty waking up     Past Medical History:   Diagnosis Date    Anemia     Asthma     on allergy shots, no inhalaers    Back injury     Back pain     Breast cancer ([facility] [address] 12/6/2011    Dr. Sonya Gastelum; Dr. Luis Simeon easily     Carpal tunnel syndrome, right     EMG done only in left however    Chronic pain     pelvic pain    Constipation     G6PD (glucose 6 phosphatase deficiency)     Gastroparesis     GERD (gastroesophageal reflux disease)     acid reflux    H/O colonoscopy 3/15/2013    Dr. Cyn Rucker Hepatitis A     History of abscessed tooth     Hypertension     no mediacations, doctor is monitoring    Hyperthyroidism     Grave's- radiation    IBS (irritable bowel syndrome)     Lymphedema 2012    in her right underarm    Numbness     legs    Osteopenia 3/19/2013    Other and unspecified hyperlipidemia     PUD (peptic ulcer disease)     pyloric ulcer    Unspecified hypothyroidism      Past Surgical History:   Procedure Laterality Date    HX BREAST RECONSTRUCTION  2012    HX BREAST RECONSTRUCTION      HX CARPAL TUNNEL RELEASE      HX CYST INCISION AND DRAINAGE      tooth abscess    HX MASTECTOMY  2012    right side    HX MASTECTOMY Right     HX MOHS PROCEDURES      HX ORTHOPAEDIC      rotator cuff repair on left- Dr. Vanesa Stovall HX [address]  05/2016    decompression, spinal fusion    HX OTHER SURGICAL      spinal chord stimulator inssertion/ did not stay in       Review of Systems   HENT: Positive for congestion (but getting better). Respiratory: Positive for shortness of breath (mild and getting better). Objective:     Visit Vitals  LMP 08/28/2002      General: alert, cooperative, no distress   Mental  status: normal mood, behavior, speech, dress, motor activity, and thought processes, able to follow commands   HENT: NCAT   Neck: no visualized mass   Resp: no respiratory distress   Neuro: no gross deficits   Skin: no discoloration or lesions of concern on visible areas   Psychiatric: normal affect, consistent with stated mood, no evidence of hallucinations     Additional exam findings: We discussed the expected course, resolution and complications of the diagnosis(es) in detail. Medication risks, benefits, costs, interactions, and alternatives were discussed as indicated. I advised her to contact the office if her condition worsens, changes or fails to improve as anticipated. She expressed understanding with the diagnosis(es) and plan. Corwin Mcwilliams is a 60 y.o. female who was evaluated by a video visit encounter for concerns as above.  Patient identification was verified prior to start of the visit. A caregiver was present when appropriate. Due to this being a TeleHealth encounter (During [id number] public health emergency), evaluation of the following organ systems was limited: Vitals/Constitutional/EENT/Resp/CV/GI//MS/Neuro/Skin/Heme-Lymph-Imm. Pursuant to the emergency declaration under the [facility], [facility] waiver authority and the [employer] Act, this Virtual  Visit was conducted, with patient's (and/or legal guardian's) consent, to reduce the patient's risk of exposure to COVID-19 and provide necessary medical care. Services were provided through a video synchronous discussion virtually to substitute for in-person clinic visit. Patient and provider were located at their individual homes.       Johanne Glasgow MD

## 2020-05-14 ENCOUNTER — TELEPHONE (OUTPATIENT)
Dept: FAMILY MEDICINE CLINIC | Age: 65
End: 2020-05-14

## 2020-05-14 RX ORDER — DOXYCYCLINE 100 MG/1
100 TABLET ORAL 2 TIMES DAILY
Qty: 20 TAB | Refills: 0 | Status: SHIPPED | OUTPATIENT
Start: 2020-05-14 | End: 2020-05-24

## 2020-05-14 NOTE — PROGRESS NOTES
Patient called after hours c/o having a repeat occurrence of her UR symptoms. She says they are the same exact symptoms she had before. She felt better after completing the last round of Abx but then in 1-2 days, symptoms started again, burning in RT chest, some difficulty with breathing. I suggested that we have a visit the next day but she said that  \"I just saw you a few days ago and you guys are breaking the bank\" so in other words, did not want to have a visit. I sent in the Abx.

## 2020-05-14 NOTE — TELEPHONE ENCOUNTER
Pt called because she had VV on 4/30/2020 and she states that her issues that she addressed then have relapsed completely and she wants to speak to the nurse about it. Please return her call at the earliest convenience.

## 2020-05-18 ENCOUNTER — TELEPHONE (OUTPATIENT)
Dept: FAMILY MEDICINE CLINIC | Age: 65
End: 2020-05-18

## 2020-05-18 NOTE — TELEPHONE ENCOUNTER
Pt is calling because the following medication doxycycline cost to much and would prefer doxycycline hyclate only because the cost is a lot cheaper for the pt

## 2020-05-19 NOTE — TELEPHONE ENCOUNTER
Spoke to pharmacy yesterday, they were going to substitute with doxy hyclate, verified ok with Dr Brendan Cody.

## 2020-05-29 ENCOUNTER — TELEPHONE (OUTPATIENT)
Dept: FAMILY MEDICINE CLINIC | Age: 65
End: 2020-05-29

## 2020-05-29 RX ORDER — TOBRAMYCIN 3 MG/ML
1 SOLUTION/ DROPS OPHTHALMIC EVERY 6 HOURS
Qty: 1 BOTTLE | Refills: 0 | Status: SHIPPED | OUTPATIENT
Start: 2020-05-29 | End: 2020-06-05

## 2020-05-29 NOTE — TELEPHONE ENCOUNTER
Notify pt that I sent in an eye drop Rx. If she does not improve from this, she will need to see ophthalmology.

## 2020-05-29 NOTE — TELEPHONE ENCOUNTER
Pt called this afternoon concerned again about the pink eye. She finished her 2nd round of doxycycline. Her left and right eye are puffy and draining again. She is asking if there is something that can be directly applied to her eye that may help. She said the rest of her symptoms are much better.

## 2020-06-01 NOTE — TELEPHONE ENCOUNTER
Called pt 05/29/20 left msg that med was sent. Called again today to confirm she has the new eye drop. No answer, left  with  recommendation. Closing encounter.

## 2020-07-20 RX ORDER — LEVOTHYROXINE SODIUM 100 UG/1
TABLET ORAL
Qty: 90 TAB | Refills: 1 | Status: SHIPPED | OUTPATIENT
Start: 2020-07-20 | End: 2020-12-31 | Stop reason: SDUPTHER

## 2020-07-27 ENCOUNTER — VIRTUAL VISIT (OUTPATIENT)
Dept: FAMILY MEDICINE CLINIC | Age: 65
End: 2020-07-27

## 2020-07-27 DIAGNOSIS — R30.0 DYSURIA: ICD-10-CM

## 2020-07-27 DIAGNOSIS — H10.9 CONJUNCTIVITIS OF BOTH EYES, UNSPECIFIED CONJUNCTIVITIS TYPE: Primary | ICD-10-CM

## 2020-07-27 RX ORDER — OFLOXACIN 3 MG/ML
1 SOLUTION/ DROPS OPHTHALMIC EVERY 6 HOURS
Qty: 5 ML | Refills: 0 | Status: SHIPPED | OUTPATIENT
Start: 2020-07-27 | End: 2020-08-03

## 2020-07-27 NOTE — PROGRESS NOTES
Annie Gunter is a 60 y.o. female who was seen by synchronous (real-time) audio-video technology on 7/27/2020 for Eye Problem        Assessment & Plan:   Diagnoses and all orders for this visit:    1. Conjunctivitis of both eyes, unspecified conjunctivitis type  -     ofloxacin (FLOXIN) 0.3 % ophthalmic solution; Administer 1 Drop to both eyes every six (6) hours for 7 days. 2. Dysuria  -     URINALYSIS W/ RFLX MICROSCOPIC; Future  -     CULTURE, URINE; Future        Pt will come today to drop off a urine. Regarding her eye, she will try above medication x 1 week, if not improving she was instructed to see her ophthalmologist.    712  Subjective:     Pt was seen on a virtual visit. Having irritation of the LT eye for 3 weeks now. She was seen in June by her allergist (Dr. Carlos Pantoja). She had the same irritation then so, he treated her with Tobramycin. It took about 10 days to resolve. Then 7/7 she started having similar irritation so she started to use the remainder of her drops but has not improved this time. She also wonders if the infection has spread to her bladder because she has burning on urination for the last 2-3 days. Feels like she is not getting much urine out. Prior to Admission medications    Medication Sig Start Date End Date Taking? Authorizing Provider   ofloxacin (FLOXIN) 0.3 % ophthalmic solution Administer 1 Drop to both eyes every six (6) hours for 7 days. 7/27/20 8/3/20 Yes Devika Evans MD   levothyroxine (SYNTHROID) 100 mcg tablet TAKE 1 TABLET EVERY DAY BEFORE BREAKFAST 7/20/20   Devika Evans MD   diclofenac (VOLTAREN) 1 % gel Apply  to affected area four (4) times daily. Provider, Historical   methocarbamol (ROBAXIN) 750 mg tablet TAKE 1 TAB BY MOUTH THREE (3) TIMES DAILY. AS NEEDED FOR MUSCLE SPASMS 11/26/19   Morgan IBARRA,    ondansetron (ZOFRAN ODT) 8 mg disintegrating tablet Take 1 Tab by mouth every eight (8) hours as needed for Nausea.  11/26/19   Bladimir Dyson DO   hydrOXYzine HCl (ATARAX) 25 mg tablet Take 1 Tab by mouth every eight (8) hours as needed for Itching (itching, irritation, anxiety - PRN opiate withdrawl) for up to 12 doses. 11/25/19   Landon IBARRA, DO   cloNIDine HCl (CATAPRES) 0.1 mg tablet Take 1 Tab by mouth every twelve (12) hours as needed (restlessness, hot flashes, sweats - PRN opiate withdrawl) for up to 8 doses. 11/25/19   Tyler Hamilton, DO   promethazine (PHENERGAN) 12.5 mg tablet Take 1 Tab by mouth every eight (8) hours as needed for Nausea (nausea, vomiting - opiate withdrawl PRN) for up to 12 doses. 11/25/19   Tyler Hamilton, DO   levocetirizine (XYZAL) 5 mg tablet Take  by mouth nightly. Provider, Historical   budesonide (RHINOCORT AQUA) 32 mcg/actuation nasal spray 1 [place] by Nasal route. Provider, Historical   ipratropium (ATROVENT) 42 mcg (0.06 %) nasal spray 2 Sprays three (3) times daily. Provider, Historical   azelastine (ASTEPRO) 0.15 % (205.5 mcg) 2 Sprays by Both Nostrils route two (2) times a day. Provider, Historical   naloxone (NARCAN) 4 mg/actuation nasal spray Use 1 spray intranasally into 1 nostril as needed for opioid respiratory emergency only. Patient taking differently: 1 [place] by IntraNASal route once as needed (pt states she has this at home). Use 1 spray intranasally into 1 nostril as needed for opioid respiratory emergency only. 1/2/19   Minus SANDI Mcintyre   pseudoephedrine ER (SUDAFED 24 HOUR) 240 mg Tb24 tablet Take 120 mg by mouth every twenty-four (24) hours. Provider, Historical   cholecalciferol, vitamin D3, (VITAMIN D3) 2,000 unit tab Take 2,000 Units by mouth daily. Provider, Historical   Omega-3-DHA-EPA-Fish Oil 1,000 mg (120 mg-180 mg) cap Take 1 Cap by mouth daily. Provider, Historical   ranitidine (ZANTAC) 150 mg tablet Take 150 mg by mouth daily as needed.     Provider, Historical     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, T45.1X5A    Chest wall pain following surgery R07.89, G89.18    Renal insufficiency N28.9    Proteinuria R80.9    Chronic insomnia F51.04    Paroxysmal sleep G47.419    Encounter for long-term (current) use of high-risk medication Z79.899    Foraminal stenosis of lumbar region M48.061    Lumbar facet arthropathy M47.816    Lumbar post-laminectomy syndrome M96.1    Lumbar neuritis M54.16    Spasm of back muscles M62.830    Drug-induced constipation K59.03    Bilateral sacroiliitis (HCC) M46.1    Drug-induced nausea and vomiting R11.2, T50.905A    Irritable bowel syndrome with both constipation and diarrhea K58.2    Fibromyalgia M79.7    Anxiety F41.9    Depression F32.9    Hypersomnolence G47.10    Chronic pain syndrome G89.4    Recurrent depression (HCC) F33.9     Current Outpatient Medications   Medication Sig Dispense Refill    ofloxacin (FLOXIN) 0.3 % ophthalmic solution Administer 1 Drop to both eyes every six (6) hours for 7 days. 5 mL 0    levothyroxine (SYNTHROID) 100 mcg tablet TAKE 1 TABLET EVERY DAY BEFORE BREAKFAST 90 Tab 1    diclofenac (VOLTAREN) 1 % gel Apply  to affected area four (4) times daily.  methocarbamol (ROBAXIN) 750 mg tablet TAKE 1 TAB BY MOUTH THREE (3) TIMES DAILY. AS NEEDED FOR MUSCLE SPASMS 90 Tab 2    ondansetron (ZOFRAN ODT) 8 mg disintegrating tablet Take 1 Tab by mouth every eight (8) hours as needed for Nausea.  30 Tab 2    hydrOXYzine HCl (ATARAX) 25 mg tablet Take 1 Tab by mouth every eight (8) hours as needed for Itching (itching, irritation, anxiety - PRN opiate withdrawl) for up to 12 doses. 12 Tab 0    cloNIDine HCl (CATAPRES) 0.1 mg tablet Take 1 Tab by mouth every twelve (12) hours as needed (restlessness, hot flashes, sweats - PRN opiate withdrawl) for up to 8 doses. 8 Tab 0    promethazine (PHENERGAN) 12.5 mg tablet Take 1 Tab by mouth every eight (8) hours as needed for Nausea (nausea, vomiting - opiate withdrawl PRN) for up to 12 doses. 12 Tab 0    levocetirizine (XYZAL) 5 mg tablet Take  by mouth nightly.  budesonide (RHINOCORT AQUA) 32 mcg/actuation nasal spray 1 Spray by Nasal route.  ipratropium (ATROVENT) 42 mcg (0.06 %) nasal spray 2 Sprays three (3) times daily.  azelastine (ASTEPRO) 0.15 % (205.5 mcg) 2 Sprays by Both Nostrils route two (2) times a day.  naloxone (NARCAN) 4 mg/actuation nasal spray Use 1 spray intranasally into 1 nostril as needed for opioid respiratory emergency only. (Patient taking differently: 1 [place] by IntraNASal route once as needed (pt states she has this at home). Use 1 spray intranasally into 1 nostril as needed for opioid respiratory emergency only.) 2 Each 0    pseudoephedrine ER (SUDAFED 24 HOUR) 240 mg Tb24 tablet Take 120 mg by mouth every twenty-four (24) hours.  cholecalciferol, vitamin D3, (VITAMIN D3) 2,000 unit tab Take 2,000 Units by mouth daily.  Omega-3-DHA-EPA-Fish Oil 1,000 mg (120 mg-180 mg) cap Take 1 Cap by mouth daily.  ranitidine (ZANTAC) 150 mg tablet Take 150 mg by mouth daily as needed.        Allergies   Allergen Reactions    Adhesive Rash    Aspirin Other (comments) and Nausea and Vomiting     G6PD  G6PD  GI BLEED AND ULCER    Contrast Agent [Iodine] Rash     Allergic to CT Dye    Dilantin [Phenytoin Sodium Extended] Other (comments)     Difficulty waking    Iodinated Contrast Media Rash     \"bumps on face\"    Lipitor [Atorvastatin] Other (comments)     CPK    Phenytoin Unknown (comments) \"Trouble waking up\"    Sulfa (Sulfonamide Antibiotics) Rash and Nausea and Vomiting     G6PD  G6PD    Tegretol [Carbamazepine] Other (comments) and Vertigo     \"Trouble waking up\"  Difficulty waking up     Past Medical History:   Diagnosis Date    Anemia     Asthma     on allergy shots, no inhalaers    Back injury     Back pain     Breast cancer ([facility] [address] 2011    Dr. Ramila Reilly; Dr. Susana bolton     Carpal tunnel syndrome, right     EMG done only in left however    Chronic pain     pelvic pain    Constipation     G6PD (glucose 6 phosphatase deficiency)     Gastroparesis     GERD (gastroesophageal reflux disease)     acid reflux    H/O colonoscopy 3/15/2013    Dr. Scarlett Camilo History of abscessed tooth     Hypertension     no mediacations, doctor is monitoring    Hyperthyroidism     Grave's- radiation    IBS (irritable bowel syndrome)     Lymphedema     in her right underarm    Numbness     legs    Osteopenia 3/19/2013    Other and unspecified hyperlipidemia     PUD (peptic ulcer disease)     pyloric ulcer    Unspecified hypothyroidism      Past Surgical History:   Procedure Laterality Date    HX BREAST RECONSTRUCTION      HX BREAST RECONSTRUCTION      HX CARPAL TUNNEL RELEASE      HX CYST INCISION AND DRAINAGE      tooth abscess    HX MASTECTOMY  2012    right side    HX MASTECTOMY Right     HX MOHS PROCEDURES      HX ORTHOPAEDIC      rotator cuff repair on left- Dr. Orantes Ship HX [address]  2016    decompression, spinal fusion    HX OTHER SURGICAL      spinal chord stimulator inssertion/ did not stay in     Family History   Problem Relation Age of Onset    Diabetes Mother     Heart Attack Mother     Heart Disease Mother         age 64-    [facility] Stroke Other         aunts    Hypertension Other         great grandparents    Diabetes Other         great grandmother     Social History     Tobacco Use    Smoking status: Never Smoker    Smokeless tobacco: Never Used   Substance Use Topics    Alcohol use: No       Review of Systems   Eyes: Positive for discharge and redness. Objective:   No flowsheet data found. General: alert, cooperative, no distress   Mental  status: normal mood, behavior, speech, dress, motor activity, and thought processes, able to follow commands   HENT: NCAT   Neck: no visualized mass   Resp: no respiratory distress   Neuro: no gross deficits   Skin: no discoloration or lesions of concern on visible areas   Psychiatric: normal affect, consistent with stated mood, no evidence of hallucinations     Additional exam findings: We discussed the expected course, resolution and complications of the diagnosis(es) in detail. Medication risks, benefits, costs, interactions, and alternatives were discussed as indicated. I advised her to contact the office if her condition worsens, changes or fails to improve as anticipated. She expressed understanding with the diagnosis(es) and plan. Xander Baca, who was evaluated through a patient-initiated, synchronous (real-time) audio-video encounter, and/or her healthcare decision maker, is aware that it is a billable service, with coverage as determined by her insurance carrier. She provided verbal consent to proceed: Yes, and patient identification was verified. It was conducted pursuant to the emergency declaration under the [facility], [address] [place] [address] authority and the [employer] Act. A caregiver was present when appropriate. Ability to conduct physical exam was limited. I was in the office. The patient was at home.       Guy Santiago MD

## 2020-07-29 ENCOUNTER — TELEPHONE (OUTPATIENT)
Dept: FAMILY MEDICINE CLINIC | Age: 65
End: 2020-07-29

## 2020-07-29 RX ORDER — AMOXICILLIN AND CLAVULANATE POTASSIUM 500; 125 MG/1; MG/1
1 TABLET, FILM COATED ORAL 2 TIMES DAILY
Qty: 20 TAB | Refills: 0 | Status: SHIPPED | OUTPATIENT
Start: 2020-07-29 | End: 2020-08-08

## 2020-07-31 LAB
APPEARANCE UR: CLEAR
BACTERIA UR CULT: NORMAL
BILIRUB UR QL STRIP: NEGATIVE
COLOR UR: YELLOW
GLUCOSE UR QL: NEGATIVE
HGB UR QL STRIP: NEGATIVE
KETONES UR QL STRIP: NEGATIVE
LEUKOCYTE ESTERASE UR QL STRIP: NEGATIVE
MICRO URNS: ABNORMAL
NITRITE UR QL STRIP: NEGATIVE
PH UR STRIP: 5.5 [PH] (ref 5–7.5)
PROT UR QL STRIP: NEGATIVE
SP GR UR: <=1.005 (ref 1–1.03)
UROBILINOGEN UR STRIP-MCNC: 0.2 MG/DL (ref 0.2–1)

## 2020-08-07 LAB
ALBUMIN SERPL-MCNC: 4.2 G/DL (ref 3.8–4.8)
ALP SERPL-CCNC: 45 IU/L (ref 39–117)
ALT SERPL-CCNC: 19 IU/L (ref 0–32)
AST SERPL-CCNC: 20 IU/L (ref 0–40)
BASOPHILS # BLD AUTO: 0 X10E3/UL (ref 0–0.2)
BASOPHILS NFR BLD AUTO: 1 %
BILIRUB DIRECT SERPL-MCNC: 0.11 MG/DL (ref 0–0.4)
BILIRUB SERPL-MCNC: 0.4 MG/DL (ref 0–1.2)
BUN SERPL-MCNC: 15 MG/DL (ref 8–27)
BUN/CREAT SERPL: 14 (ref 12–28)
CALCIUM SERPL-MCNC: 9 MG/DL (ref 8.7–10.3)
CHLORIDE SERPL-SCNC: 101 MMOL/L (ref 96–106)
CHOLEST SERPL-MCNC: 267 MG/DL (ref 100–199)
CO2 SERPL-SCNC: 26 MMOL/L (ref 20–29)
CREAT SERPL-MCNC: 1.09 MG/DL (ref 0.57–1)
EOSINOPHIL # BLD AUTO: 0.1 X10E3/UL (ref 0–0.4)
EOSINOPHIL NFR BLD AUTO: 1 %
ERYTHROCYTE [DISTWIDTH] IN BLOOD BY AUTOMATED COUNT: 11.8 % (ref 11.7–15.4)
GLUCOSE SERPL-MCNC: 83 MG/DL (ref 65–99)
HCT VFR BLD AUTO: 37.2 % (ref 34–46.6)
HDLC SERPL-MCNC: 63 MG/DL
HGB BLD-MCNC: 11.8 G/DL (ref 11.1–15.9)
IMM GRANULOCYTES # BLD AUTO: 0 X10E3/UL (ref 0–0.1)
IMM GRANULOCYTES NFR BLD AUTO: 0 %
INTERPRETATION, 910389: NORMAL
INTERPRETATION: NORMAL
LDLC SERPL CALC-MCNC: 186 MG/DL (ref 0–99)
LYMPHOCYTES # BLD AUTO: 3.3 X10E3/UL (ref 0.7–3.1)
LYMPHOCYTES NFR BLD AUTO: 55 %
MCH RBC QN AUTO: 28.7 PG (ref 26.6–33)
MCHC RBC AUTO-ENTMCNC: 31.7 G/DL (ref 31.5–35.7)
MCV RBC AUTO: 91 FL (ref 79–97)
MONOCYTES # BLD AUTO: 0.5 X10E3/UL (ref 0.1–0.9)
MONOCYTES NFR BLD AUTO: 8 %
NEUTROPHILS # BLD AUTO: 2.2 X10E3/UL (ref 1.4–7)
NEUTROPHILS NFR BLD AUTO: 35 %
PDF IMAGE, 910387: NORMAL
PLATELET # BLD AUTO: 234 X10E3/UL (ref 150–450)
POTASSIUM SERPL-SCNC: 4.7 MMOL/L (ref 3.5–5.2)
PROT SERPL-MCNC: 6.9 G/DL (ref 6–8.5)
RBC # BLD AUTO: 4.11 X10E6/UL (ref 3.77–5.28)
SODIUM SERPL-SCNC: 141 MMOL/L (ref 134–144)
T4 FREE SERPL-MCNC: 1.21 NG/DL (ref 0.82–1.77)
TRIGL SERPL-MCNC: 88 MG/DL (ref 0–149)
TSH SERPL DL<=0.005 MIU/L-ACNC: 3.02 UIU/ML (ref 0.45–4.5)
VLDLC SERPL CALC-MCNC: 18 MG/DL (ref 5–40)
WBC # BLD AUTO: 6.1 X10E3/UL (ref 3.4–10.8)

## 2020-08-07 NOTE — PROGRESS NOTES
Tell her to increase her water intake. Kidneys are a little dry. Cholesterol is much higher. I know she can not tolerate cholesterol meds so she needs to be more active and improve her diet.

## 2020-08-10 ENCOUNTER — TELEPHONE (OUTPATIENT)
Dept: FAMILY MEDICINE CLINIC | Age: 65
End: 2020-08-10

## 2020-08-10 NOTE — TELEPHONE ENCOUNTER
Patient wanted to update Dr Castro Guerra on her eye . She has seen the eye dr and was diagnosed with staph infection and impacted tear ducts . Given steriod eye drops to try since she was already Augmentin given by Dr Castro Guerra .

## 2020-08-21 LAB — MAMMOGRAPHY, EXTERNAL: NORMAL

## 2020-09-15 ENCOUNTER — VIRTUAL VISIT (OUTPATIENT)
Dept: FAMILY MEDICINE CLINIC | Age: 65
End: 2020-09-15

## 2020-09-15 DIAGNOSIS — N12 PYELONEPHRITIS: Primary | ICD-10-CM

## 2020-09-15 LAB
BILIRUB UR QL STRIP: NEGATIVE
GLUCOSE UR-MCNC: NEGATIVE MG/DL
KETONES P FAST UR STRIP-MCNC: NORMAL MG/DL
PH UR STRIP: 5 [PH] (ref 4.6–8)
PROT UR QL STRIP: NEGATIVE
SP GR UR STRIP: 1.03 (ref 1–1.03)
UA UROBILINOGEN AMB POC: NORMAL (ref 0.2–1)
URINALYSIS CLARITY POC: CLEAR
URINALYSIS COLOR POC: YELLOW
URINE BLOOD POC: NEGATIVE
URINE LEUKOCYTES POC: NEGATIVE
URINE NITRITES POC: NEGATIVE

## 2020-09-15 RX ORDER — CIPROFLOXACIN 500 MG/1
500 TABLET ORAL 2 TIMES DAILY
Qty: 20 TAB | Refills: 0 | Status: SHIPPED | OUTPATIENT
Start: 2020-09-15 | End: 2020-09-25

## 2020-09-15 RX ORDER — NITROFURANTOIN 25; 75 MG/1; MG/1
100 CAPSULE ORAL 2 TIMES DAILY
Qty: 14 CAP | Refills: 0 | Status: CANCELLED | OUTPATIENT
Start: 2020-09-15

## 2020-09-15 NOTE — PROGRESS NOTES
Farrah Breaux is a 60 y.o. female who was seen by synchronous (real-time) audio-video technology on 9/15/2020 for Bladder Infection    Assessment & Plan:   Diagnoses and all orders for this visit:    1. Pyelonephritis-failed amox. Will cover with cipro. Send for culture. -     ciprofloxacin HCl (CIPRO) 500 mg tablet; Take 1 Tab by mouth two (2) times a day for 10 days. Subjective:   Pt of Dr. Cherie Velarde presents with c/o 1 week h/o \"being sick\". States she started amoxicillin previously prescribed (500mg tid). She c/o urinary frequency, pelvic pain, urgency, diffuse joint pain. No f/c. + R flank pain. Prior to Admission medications    Medication Sig Start Date End Date Taking? Authorizing Provider   levothyroxine (SYNTHROID) 100 mcg tablet TAKE 1 TABLET EVERY DAY BEFORE BREAKFAST 7/20/20   Ester Garibay MD   diclofenac (VOLTAREN) 1 % gel Apply  to affected area four (4) times daily. Provider, Historical   methocarbamol (ROBAXIN) 750 mg tablet TAKE 1 TAB BY MOUTH THREE (3) TIMES DAILY. AS NEEDED FOR MUSCLE SPASMS 11/26/19   Nabor IBARRA, DO   ondansetron (ZOFRAN ODT) 8 mg disintegrating tablet Take 1 Tab by mouth every eight (8) hours as needed for Nausea. 11/26/19   Tennie Oats, DO   hydrOXYzine HCl (ATARAX) 25 mg tablet Take 1 Tab by mouth every eight (8) hours as needed for Itching (itching, irritation, anxiety - PRN opiate withdrawl) for up to 12 doses. 11/25/19   Nabor IBARRA, DO   cloNIDine HCl (CATAPRES) 0.1 mg tablet Take 1 Tab by mouth every twelve (12) hours as needed (restlessness, hot flashes, sweats - PRN opiate withdrawl) for up to 8 doses. 11/25/19   Tennie Oats, DO   promethazine (PHENERGAN) 12.5 mg tablet Take 1 Tab by mouth every eight (8) hours as needed for Nausea (nausea, vomiting - opiate withdrawl PRN) for up to 12 doses. 11/25/19   Tennie Oats, DO   levocetirizine (XYZAL) 5 mg tablet Take  by mouth nightly.     Provider, Historical   budesonide (RHINOCORT AQUA) 32 mcg/actuation nasal spray 1 [place] by Nasal route. Provider, Historical   ipratropium (ATROVENT) 42 mcg (0.06 %) nasal spray 2 Sprays three (3) times daily. Provider, Historical   azelastine (ASTEPRO) 0.15 % (205.5 mcg) 2 Sprays by Both Nostrils route two (2) times a day. Provider, Historical   naloxone (NARCAN) 4 mg/actuation nasal spray Use 1 spray intranasally into 1 nostril as needed for opioid respiratory emergency only. Patient taking differently: 1 [place] by IntraNASal route once as needed (pt states she has this at home). Use 1 spray intranasally into 1 nostril as needed for opioid respiratory emergency only. 1/2/19   Jonelle Oliveira NP   pseudoephedrine ER (SUDAFED 24 HOUR) 240 mg Tb24 tablet Take 120 mg by mouth every twenty-four (24) hours. Provider, Historical   cholecalciferol, vitamin D3, (VITAMIN D3) 2,000 unit tab Take 2,000 Units by mouth daily. Provider, Historical   Omega-3-DHA-EPA-Fish Oil 1,000 mg (120 mg-180 mg) cap Take 1 Cap by mouth daily. Provider, Historical   ranitidine (ZANTAC) 150 mg tablet Take 150 mg by mouth daily as needed.     Provider, Historical     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, T45.1X5A    Chest wall pain following surgery R07.89, G89.18    Renal insufficiency N28.9    Proteinuria R80.9    Chronic insomnia F51.04    Paroxysmal sleep G47.419    Encounter for long-term (current) use of high-risk medication Z79.899    Foraminal stenosis of lumbar region M48.061    Lumbar facet arthropathy M47.816    Lumbar post-laminectomy syndrome M96.1    Lumbar neuritis M54.16    Spasm of back muscles M62.830    Drug-induced constipation K59.03    Bilateral sacroiliitis (HCC) M46.1    Drug-induced nausea and vomiting R11.2, T50.905A    Irritable bowel syndrome with both constipation and diarrhea K58.2    Fibromyalgia M79.7    Anxiety F41.9    Depression F32.9    Hypersomnolence G47.10    Chronic pain syndrome G89.4    Recurrent depression (HCC) F33.9       Review of Systems   Constitutional: Positive for chills. Negative for fever. Genitourinary: Positive for dysuria, flank pain, frequency and urgency. Objective:   No flowsheet data found.      [INSTRUCTIONS:  \"[x]\" Indicates a positive item  \"[]\" Indicates a negative item  -- DELETE ALL ITEMS NOT EXAMINED]    Constitutional: [x] Appears well-developed and well-nourished [x] No apparent distress      [] Abnormal -     Mental status: [x] Alert and awake  [x] Oriented to person/place/time [x] Able to follow commands    [] Abnormal -     Eyes:   EOM    [x]  Normal    [] Abnormal -   Sclera  [x]  Normal    [] Abnormal -          Discharge [x]  None visible   [] Abnormal -     HENT: [x] Normocephalic, atraumatic  [] Abnormal -   [x] Mouth/Throat: Mucous membranes are moist    External Ears [x] Normal  [] Abnormal -    Neck: [x] No visualized mass [] Abnormal -     Pulmonary/Chest: [x] Respiratory effort normal   [x] No visualized signs of difficulty breathing or respiratory distress        [] Abnormal -      Musculoskeletal:   [] Normal gait with no signs of ataxia         [] Normal range of motion of neck        [] Abnormal -     Neurological:        [] No Facial Asymmetry (Cranial nerve 7 motor function) (limited exam due to video visit)          [] No gaze palsy        [] Abnormal -          Skin:        [] No significant exanthematous lesions or discoloration noted on facial skin         [] Abnormal -            Psychiatric:       [x] Normal Affect [] Abnormal -        [x] No Hallucinations    Other pertinent observable physical exam findings:-        We discussed the expected course, resolution and complications of the diagnosis(es) in detail. Medication risks, benefits, costs, interactions, and alternatives were discussed as indicated. I advised her to contact the office if her condition worsens, changes or fails to improve as anticipated. She expressed understanding with the diagnosis(es) and plan. Justyna Davis, who was evaluated through a patient-initiated, synchronous (real-time) audio-video encounter, and/or her healthcare decision maker, is aware that it is a billable service, with coverage as determined by her insurance carrier. She provided verbal consent to proceed: Yes, and patient identification was verified. It was conducted pursuant to the emergency declaration under the [facility], [address] [place] [address] authority and the [employer] General Act. A caregiver was present when appropriate. Ability to conduct physical exam was limited. I was at home. The patient was at home.       Clement Piedra MD

## 2020-09-17 LAB — BACTERIA UR CULT: NO GROWTH

## 2020-10-06 ENCOUNTER — VIRTUAL VISIT (OUTPATIENT)
Dept: FAMILY MEDICINE CLINIC | Age: 65
End: 2020-10-06
Payer: MEDICARE

## 2020-10-06 DIAGNOSIS — M54.16 LUMBAR NEURITIS: Primary | ICD-10-CM

## 2020-10-06 DIAGNOSIS — M51.37 DEGENERATION OF LUMBAR OR LUMBOSACRAL INTERVERTEBRAL DISC: ICD-10-CM

## 2020-10-06 DIAGNOSIS — M47.816 LUMBAR FACET ARTHROPATHY: ICD-10-CM

## 2020-10-06 PROCEDURE — 99442 PR PHYS/QHP TELEPHONE EVALUATION 11-20 MIN: CPT | Performed by: INTERNAL MEDICINE

## 2020-10-06 NOTE — PROGRESS NOTES
Bartolome Holder is a 60 y.o. female, evaluated via audio-only technology on 10/6/2020 for Urinary Frequency  . Assessment & Plan:   Diagnoses and all orders for this visit:    1. Lumbar neuritis    2. Lumbar facet arthropathy    3. Degeneration of lumbar intervertebral disc            Since pt's urinary frequency did not prove to be an actual UTI on culture, her symptoms may certainly be related to her back. I advised her to call her specialist and make that appt to see them as soon as she can. She will plan to see me for a PE after this. She did her labs. 12  Subjective:     Pt was seen on a virtual visit. She calls with a continuation of her issue from Sept, when se was seen by Dr. Caroline Finnegan. She continues to have urinary frequency and the feeling that she can not empty bladder well. As we discussed this, she was reminded of the fact that in past, she has had this similar presentation where she feels like she has a UTI that is just not getting better. But at that time, it ended up being the issue with her back, with collapsed lumbar discs that all caused the symptoms she was having. She had surgery then and since then, was on a recurrent 6-9 mon schedule of having radiofrequency ablation of the lumbar spinal nerves. With Covid, and other things in life, she has not had this procedure since July 2019. Prior to Admission medications    Medication Sig Start Date End Date Taking? Authorizing Provider   levothyroxine (SYNTHROID) 100 mcg tablet TAKE 1 TABLET EVERY DAY BEFORE BREAKFAST 7/20/20   Samy Elias MD   diclofenac (VOLTAREN) 1 % gel Apply  to affected area four (4) times daily. Provider, Historical   methocarbamol (ROBAXIN) 750 mg tablet TAKE 1 TAB BY MOUTH THREE (3) TIMES DAILY. AS NEEDED FOR MUSCLE SPASMS 11/26/19   Ryan IBARRA DO   ondansetron (ZOFRAN ODT) 8 mg disintegrating tablet Take 1 Tab by mouth every eight (8) hours as needed for Nausea.  11/26/19   Todd Calvo DO cloNIDine HCl (CATAPRES) 0.1 mg tablet Take 1 Tab by mouth every twelve (12) hours as needed (restlessness, hot flashes, sweats - PRN opiate withdrawl) for up to 8 doses. 11/25/19   Carmie Boehringer, DO   promethazine (PHENERGAN) 12.5 mg tablet Take 1 Tab by mouth every eight (8) hours as needed for Nausea (nausea, vomiting - opiate withdrawl PRN) for up to 12 doses. 11/25/19   Carmie Boehringer, DO   levocetirizine (XYZAL) 5 mg tablet Take  by mouth nightly. Provider, Historical   budesonide (RHINOCORT AQUA) 32 mcg/actuation nasal spray 1 [place] by Nasal route. Provider, Historical   ipratropium (ATROVENT) 42 mcg (0.06 %) nasal spray 2 Sprays three (3) times daily. Provider, Historical   azelastine (ASTEPRO) 0.15 % (205.5 mcg) 2 Sprays by Both Nostrils route two (2) times a day. Provider, Historical   naloxone (NARCAN) 4 mg/actuation nasal spray Use 1 spray intranasally into 1 nostril as needed for opioid respiratory emergency only. Patient taking differently: 1 [place] by IntraNASal route once as needed (pt states she has this at home). Use 1 spray intranasally into 1 nostril as needed for opioid respiratory emergency only. 1/2/19   oDri King, SANDI   pseudoephedrine ER (SUDAFED 24 HOUR) 240 mg Tb24 tablet Take 120 mg by mouth every twenty-four (24) hours. Provider, Historical   cholecalciferol, vitamin D3, (VITAMIN D3) 2,000 unit tab Take 2,000 Units by mouth daily. Provider, Historical   Omega-3-DHA-EPA-Fish Oil 1,000 mg (120 mg-180 mg) cap Take 1 Cap by mouth daily. Provider, Historical   ranitidine (ZANTAC) 150 mg tablet Take 150 mg by mouth daily as needed.     Provider, Historical     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, T45.1X5A    Chest wall pain following surgery R07.89, G89.18    Renal insufficiency N28.9    Proteinuria R80.9    Chronic insomnia F51.04    Paroxysmal sleep G47.419    Encounter for long-term (current) use of high-risk medication Z79.899    Foraminal stenosis of lumbar region M48.061    Lumbar facet arthropathy M47.816    Lumbar post-laminectomy syndrome M96.1    Lumbar neuritis M54.16    Spasm of back muscles M62.830    Drug-induced constipation K59.03    Bilateral sacroiliitis (HCC) M46.1    Drug-induced nausea and vomiting R11.2, T50.905A    Irritable bowel syndrome with both constipation and diarrhea K58.2    Fibromyalgia M79.7    Anxiety F41.9    Depression F32.9    Hypersomnolence G47.10    Chronic pain syndrome G89.4    Recurrent depression (HCC) F33.9     Current Outpatient Medications   Medication Sig Dispense Refill    levothyroxine (SYNTHROID) 100 mcg tablet TAKE 1 TABLET EVERY DAY BEFORE BREAKFAST 90 Tab 1    diclofenac (VOLTAREN) 1 % gel Apply  to affected area four (4) times daily.  methocarbamol (ROBAXIN) 750 mg tablet TAKE 1 TAB BY MOUTH THREE (3) TIMES DAILY. AS NEEDED FOR MUSCLE SPASMS 90 Tab 2    ondansetron (ZOFRAN ODT) 8 mg disintegrating tablet Take 1 Tab by mouth every eight (8) hours as needed for Nausea. 30 Tab 2    cloNIDine HCl (CATAPRES) 0.1 mg tablet Take 1 Tab by mouth every twelve (12) hours as needed (restlessness, hot flashes, sweats - PRN opiate withdrawl) for up to 8 doses. 8 Tab 0    promethazine (PHENERGAN) 12.5 mg tablet Take 1 Tab by mouth every eight (8) hours as needed for Nausea (nausea, vomiting - opiate withdrawl PRN) for up to 12 doses.  12 Tab 0    levocetirizine (XYZAL) 5 mg tablet Take  by mouth nightly.  budesonide (RHINOCORT AQUA) 32 mcg/actuation nasal spray 1 Spray by Nasal route.  ipratropium (ATROVENT) 42 mcg (0.06 %) nasal spray 2 Sprays three (3) times daily.  azelastine (ASTEPRO) 0.15 % (205.5 mcg) 2 Sprays by Both Nostrils route two (2) times a day.  naloxone (NARCAN) 4 mg/actuation nasal spray Use 1 spray intranasally into 1 nostril as needed for opioid respiratory emergency only. (Patient taking differently: 1 [place] by IntraNASal route once as needed (pt states she has this at home). Use 1 spray intranasally into 1 nostril as needed for opioid respiratory emergency only.) 2 Each 0    pseudoephedrine ER (SUDAFED 24 HOUR) 240 mg Tb24 tablet Take 120 mg by mouth every twenty-four (24) hours.  cholecalciferol, vitamin D3, (VITAMIN D3) 2,000 unit tab Take 2,000 Units by mouth daily.  Omega-3-DHA-EPA-Fish Oil 1,000 mg (120 mg-180 mg) cap Take 1 Cap by mouth daily.  ranitidine (ZANTAC) 150 mg tablet Take 150 mg by mouth daily as needed.        Allergies   Allergen Reactions    Adhesive Rash    Aspirin Other (comments) and Nausea and Vomiting     G6PD  G6PD  GI BLEED AND ULCER    Contrast Agent [Iodine] Rash     Allergic to CT Dye    Dilantin [Phenytoin Sodium Extended] Other (comments)     Difficulty waking    Iodinated Contrast Media Rash     \"bumps on face\"    Lipitor [Atorvastatin] Other (comments)     CPK    Phenytoin Unknown (comments)     \"Trouble waking up\"    Sulfa (Sulfonamide Antibiotics) Rash and Nausea and Vomiting     G6PD  G6PD    Tegretol [Carbamazepine] Other (comments) and Vertigo     \"Trouble waking up\"  Difficulty waking up     Past Medical History:   Diagnosis Date    Anemia     Asthma     on allergy shots, no inhalaers    Back injury     Back pain     Breast cancer ([facility] [address] 12/6/2011    Dr. Zulema Melo; Dr. Latanya Sears easily     Carpal tunnel syndrome, right     EMG done only in left however    Chronic pain     pelvic pain    Constipation     G6PD (glucose 6 phosphatase deficiency)     Gastroparesis     GERD (gastroesophageal reflux disease)     acid reflux    H/O colonoscopy 3/15/2013    Dr. Neo Brooks History of abscessed tooth     Hypertension     no mediacations, doctor is monitoring    Hyperthyroidism     Grave's- radiation    IBS (irritable bowel syndrome)     Lymphedema     in her right underarm    Numbness     legs    Osteopenia 3/19/2013    Other and unspecified hyperlipidemia     PUD (peptic ulcer disease)     pyloric ulcer    Unspecified hypothyroidism      Past Surgical History:   Procedure Laterality Date    HX BREAST RECONSTRUCTION  2012    HX BREAST RECONSTRUCTION      HX CARPAL TUNNEL RELEASE      HX CYST INCISION AND DRAINAGE      tooth abscess    HX MASTECTOMY  2012    right side    HX MASTECTOMY Right     HX MOHS PROCEDURES      HX ORTHOPAEDIC      rotator cuff repair on left- Dr. Fallon Mari HX ORTHOPAEDIC  2016    decompression, spinal fusion    HX OTHER SURGICAL      spinal chord stimulator inssertion/ did not stay in     Family History   Problem Relation Age of Onset    Diabetes Mother     Heart Attack Mother     Heart Disease Mother         age 64-    [facility] Stroke Other         aunts    Hypertension Other         great grandparents    Diabetes Other         great grandmother     Social History     Tobacco Use    Smoking status: Never Smoker    Smokeless tobacco: Never Used   Substance Use Topics    Alcohol use: No       Review of Systems   Genitourinary: Positive for frequency. No flowsheet data found. Favian Orozco, who was evaluated through a patient-initiated, synchronous (real-time) audio only encounter, and/or her healthcare decision maker, is aware that it is a billable service, with coverage as determined by her insurance carrier. She provided verbal consent to proceed: Yes.  She has not had a related appointment within my department in the past 7 days or scheduled within the next 24 hours.       Total Time: minutes: 11-20 minutes    Rupali Lu MD

## 2020-11-13 ENCOUNTER — VIRTUAL VISIT (OUTPATIENT)
Dept: FAMILY MEDICINE CLINIC | Age: 65
End: 2020-11-13
Payer: MEDICARE

## 2020-11-13 DIAGNOSIS — J06.9 UPPER RESPIRATORY TRACT INFECTION, UNSPECIFIED TYPE: Primary | ICD-10-CM

## 2020-11-13 PROCEDURE — 99213 OFFICE O/P EST LOW 20 MIN: CPT | Performed by: INTERNAL MEDICINE

## 2020-11-13 RX ORDER — AMOXICILLIN AND CLAVULANATE POTASSIUM 875; 125 MG/1; MG/1
1 TABLET, FILM COATED ORAL 2 TIMES DAILY
Qty: 20 TAB | Refills: 0 | Status: SHIPPED | OUTPATIENT
Start: 2020-11-13 | End: 2020-11-24 | Stop reason: SDUPTHER

## 2020-11-13 NOTE — PROGRESS NOTES
Deejay Monet is a 60 y.o. female who was seen by synchronous (real-time) audio-video technology on 11/13/2020 for Sore Throat        Assessment & Plan:   Diagnoses and all orders for this visit:    1. Upper respiratory tract infection, unspecified type    Other orders  -     amoxicillin-clavulanate (AUGMENTIN) 875-125 mg per tablet; Take 1 Tab by mouth two (2) times a day for 10 days. Will drink warm fluids. 712  Subjective:     Pt has not been feeling well the last 2 weeks. Throat feels raw, + headaches, + sinus pressure, pain above eyes, + chest congestion. No fevers but + chills, + sweating, + hoarseness. Last night looked at the back of her throat and has seen white patches. Prior to Admission medications    Medication Sig Start Date End Date Taking? Authorizing Provider   amoxicillin-clavulanate (AUGMENTIN) 875-125 mg per tablet Take 1 Tab by mouth two (2) times a day for 10 days. 11/13/20 11/23/20 Yes Faith Matias MD   levothyroxine (SYNTHROID) 100 mcg tablet TAKE 1 TABLET EVERY DAY BEFORE BREAKFAST 7/20/20   Faith Matias MD   diclofenac (VOLTAREN) 1 % gel Apply  to affected area four (4) times daily. Provider, Historical   methocarbamol (ROBAXIN) 750 mg tablet TAKE 1 TAB BY MOUTH THREE (3) TIMES DAILY. AS NEEDED FOR MUSCLE SPASMS 11/26/19   Soham IBARRA, DO   ondansetron (ZOFRAN ODT) 8 mg disintegrating tablet Take 1 Tab by mouth every eight (8) hours as needed for Nausea. 11/26/19   Soham IBARRA DO   cloNIDine HCl (CATAPRES) 0.1 mg tablet Take 1 Tab by mouth every twelve (12) hours as needed (restlessness, hot flashes, sweats - PRN opiate withdrawl) for up to 8 doses. 11/25/19   Carmie Boehringer, DO   promethazine (PHENERGAN) 12.5 mg tablet Take 1 Tab by mouth every eight (8) hours as needed for Nausea (nausea, vomiting - opiate withdrawl PRN) for up to 12 doses. 11/25/19   Carmie Boehringer, DO   levocetirizine (XYZAL) 5 mg tablet Take  by mouth nightly.     Provider, Historical budesonide (RHINOCORT AQUA) 32 mcg/actuation nasal spray 1 [place] by Nasal route. Provider, Historical   ipratropium (ATROVENT) 42 mcg (0.06 %) nasal spray 2 Sprays three (3) times daily. Provider, Historical   azelastine (ASTEPRO) 0.15 % (205.5 mcg) 2 Sprays by Both Nostrils route two (2) times a day. Provider, Historical   naloxone (NARCAN) 4 mg/actuation nasal spray Use 1 spray intranasally into 1 nostril as needed for opioid respiratory emergency only. Patient taking differently: 1 [place] by IntraNASal route once as needed (pt states she has this at home). Use 1 spray intranasally into 1 nostril as needed for opioid respiratory emergency only. 1/2/19   Ronny Ngo NP   pseudoephedrine ER (SUDAFED 24 HOUR) 240 mg Tb24 tablet Take 120 mg by mouth every twenty-four (24) hours. Provider, Historical   cholecalciferol, vitamin D3, (VITAMIN D3) 2,000 unit tab Take 2,000 Units by mouth daily. Provider, Historical   Omega-3-DHA-EPA-Fish Oil 1,000 mg (120 mg-180 mg) cap Take 1 Cap by mouth daily. Provider, Historical   ranitidine (ZANTAC) 150 mg tablet Take 150 mg by mouth daily as needed.     Provider, Historical     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, T45.1X5A    Chest wall pain following surgery R07.89, G89.18    Renal insufficiency N28.9    Proteinuria R80.9    Chronic insomnia F51.04    Paroxysmal sleep G47.419    Encounter for long-term (current) use of high-risk medication Z79.899    Foraminal stenosis of lumbar region M48.061    Lumbar facet arthropathy M47.816    Lumbar post-laminectomy syndrome M96.1    Lumbar neuritis M54.16    Spasm of back muscles M62.830    Drug-induced constipation K59.03    Bilateral sacroiliitis (HCC) M46.1    Drug-induced nausea and vomiting R11.2, T50.905A    Irritable bowel syndrome with both constipation and diarrhea K58.2    Fibromyalgia M79.7    Anxiety F41.9    Depression F32.9    Hypersomnolence G47.10    Chronic pain syndrome G89.4    Recurrent depression (HCC) F33.9     Current Outpatient Medications   Medication Sig Dispense Refill    amoxicillin-clavulanate (AUGMENTIN) 875-125 mg per tablet Take 1 Tab by mouth two (2) times a day for 10 days. 20 Tab 0    levothyroxine (SYNTHROID) 100 mcg tablet TAKE 1 TABLET EVERY DAY BEFORE BREAKFAST 90 Tab 1    diclofenac (VOLTAREN) 1 % gel Apply  to affected area four (4) times daily.  methocarbamol (ROBAXIN) 750 mg tablet TAKE 1 TAB BY MOUTH THREE (3) TIMES DAILY. AS NEEDED FOR MUSCLE SPASMS 90 Tab 2    ondansetron (ZOFRAN ODT) 8 mg disintegrating tablet Take 1 Tab by mouth every eight (8) hours as needed for Nausea. 30 Tab 2    cloNIDine HCl (CATAPRES) 0.1 mg tablet Take 1 Tab by mouth every twelve (12) hours as needed (restlessness, hot flashes, sweats - PRN opiate withdrawl) for up to 8 doses. 8 Tab 0    promethazine (PHENERGAN) 12.5 mg tablet Take 1 Tab by mouth every eight (8) hours as needed for Nausea (nausea, vomiting - opiate withdrawl PRN) for up to 12 doses. 12 Tab 0    levocetirizine (XYZAL) 5 mg tablet Take  by mouth nightly.  budesonide (RHINOCORT AQUA) 32 mcg/actuation nasal spray 1 Spray by Nasal route.  ipratropium (ATROVENT) 42 mcg (0.06 %) nasal spray 2 Sprays three (3) times daily.  azelastine (ASTEPRO) 0.15 % (205.5 mcg) 2 Sprays by Both Nostrils route two (2) times a day.       naloxone (NARCAN) 4 mg/actuation nasal spray Use 1 spray intranasally into 1 nostril as needed for opioid respiratory emergency only. (Patient taking differently: 1 [place] by IntraNASal route once as needed (pt states she has this at home). Use 1 spray intranasally into 1 nostril as needed for opioid respiratory emergency only.) 2 Each 0    pseudoephedrine ER (SUDAFED 24 HOUR) 240 mg Tb24 tablet Take 120 mg by mouth every twenty-four (24) hours.  cholecalciferol, vitamin D3, (VITAMIN D3) 2,000 unit tab Take 2,000 Units by mouth daily.  Omega-3-DHA-EPA-Fish Oil 1,000 mg (120 mg-180 mg) cap Take 1 Cap by mouth daily.  ranitidine (ZANTAC) 150 mg tablet Take 150 mg by mouth daily as needed.        Allergies   Allergen Reactions    Adhesive Rash    Aspirin Other (comments) and Nausea and Vomiting     G6PD  G6PD  GI BLEED AND ULCER    Contrast Agent [Iodine] Rash     Allergic to CT Dye    Dilantin [Phenytoin Sodium Extended] Other (comments)     Difficulty waking    Iodinated Contrast Media Rash     \"bumps on face\"    Lipitor [Atorvastatin] Other (comments)     CPK    Phenytoin Unknown (comments)     \"Trouble waking up\"    Sulfa (Sulfonamide Antibiotics) Rash and Nausea and Vomiting     G6PD  G6PD    Tegretol [Carbamazepine] Other (comments) and Vertigo     \"Trouble waking up\"  Difficulty waking up     Past Medical History:   Diagnosis Date    Anemia     Asthma     on allergy shots, no inhalaers    Back injury     Back pain     [facility]    Dr. Shannan Renae; Dr. Syed Crocker easily     Carpal tunnel syndrome, right     EMG done only in left however    Chronic pain     pelvic pain    Constipation     G6PD (glucose 6 phosphatase deficiency)     Gastroparesis     GERD (gastroesophageal reflux disease)     acid reflux    H/O colonoscopy 3/15/2013    Dr. Earvin Runner Hepatitis A     History of abscessed tooth     Hypertension     no mediacations, doctor is monitoring    Hyperthyroidism     Grave's- radiation    IBS (irritable bowel syndrome)     Lymphedema     in her right underarm    Numbness     legs    Osteopenia 3/19/2013    Other and unspecified hyperlipidemia     PUD (peptic ulcer disease)     pyloric ulcer    Unspecified hypothyroidism      Past Surgical History:   Procedure Laterality Date    HX BREAST RECONSTRUCTION  2012    HX BREAST RECONSTRUCTION      HX CARPAL TUNNEL RELEASE      HX CYST INCISION AND DRAINAGE      tooth abscess    HX MASTECTOMY  2012    right side    HX MASTECTOMY Right     HX MOHS PROCEDURES      HX ORTHOPAEDIC      rotator cuff repair on left- Dr. Ernestine Cary HX [address]  2016    decompression, spinal fusion    HX OTHER SURGICAL      spinal chord stimulator inssertion/ did not stay in     Family History   Problem Relation Age of Onset    Diabetes Mother     Heart Attack Mother     Heart Disease Mother         age 64-    [facility] Stroke Other         aunts    Hypertension Other         great grandparents    Diabetes Other         great grandmother     Social History     Tobacco Use    Smoking status: Never Smoker    Smokeless tobacco: Never Used   Substance Use Topics    Alcohol use: No       Review of Systems   HENT: Positive for sore throat. Respiratory: Positive for cough. Objective:   No flowsheet data found. General: alert, cooperative, no distress   Mental  status: normal mood, behavior, speech, dress, motor activity, and thought processes, able to follow commands   HENT: NCAT   Neck: no visualized mass   Resp: no respiratory distress   Neuro: no gross deficits   Skin: no discoloration or lesions of concern on visible areas   Psychiatric: normal affect, consistent with stated mood, no evidence of hallucinations     Additional exam findings: We discussed the expected course, resolution and complications of the diagnosis(es) in detail. Medication risks, benefits, costs, interactions, and alternatives were discussed as indicated.   I advised her to contact the office if her condition worsens, changes or fails to improve as anticipated. She expressed understanding with the diagnosis(es) and plan. Christopher Feliciano, who was evaluated through a patient-initiated, synchronous (real-time) audio-video encounter, and/or her healthcare decision maker, is aware that it is a billable service, with coverage as determined by her insurance carrier. She provided verbal consent to proceed: Yes, and patient identification was verified. It was conducted pursuant to the emergency declaration under the [address] [place] [address], [address] [place] [address] authority and the [employer] and [employer] Act. A caregiver was present when appropriate. Ability to conduct physical exam was limited. I was in the office. The patient was at home.       Marcio Abbott MD

## 2020-11-24 ENCOUNTER — TELEPHONE (OUTPATIENT)
Dept: FAMILY MEDICINE CLINIC | Age: 65
End: 2020-11-24

## 2020-11-24 RX ORDER — AMOXICILLIN AND CLAVULANATE POTASSIUM 875; 125 MG/1; MG/1
1 TABLET, FILM COATED ORAL 2 TIMES DAILY
Qty: 10 TAB | Refills: 0 | Status: SHIPPED | OUTPATIENT
Start: 2020-11-24 | End: 2020-11-29

## 2020-11-24 NOTE — TELEPHONE ENCOUNTER
I will give her an additional 5 days of Abx but then, I want her to just watch the remainder of her symptoms. The sore throat could be from drainage. Drink plenty of water.

## 2020-11-24 NOTE — TELEPHONE ENCOUNTER
Patient called to let Dr Meme Araujo know she finished Augmentin yesterday and sinuses are better but still has that sore throat feeling down in her throat. Her cough has also improved but still present and would like to know what she should do .

## 2020-12-18 ENCOUNTER — TELEPHONE (OUTPATIENT)
Dept: FAMILY MEDICINE CLINIC | Age: 65
End: 2020-12-18

## 2020-12-18 NOTE — TELEPHONE ENCOUNTER
Patient called to update Dr Hadley Huerta on her health . She was seen in the Er Friday for chest pain and Covid symptoms . She received a call today and the results were negative . She has gone through two 15 days course's of doxycycline and previous to that Augmentin . She was seen by the eye dr  who gave her the Doxycycline which she completed today and they took culture of some eye discharge. Patient has not heard back about the results and still has a sore throat and sore palette. And would like to know what Dr Hadley Huerta recommends . I reviewed this with Dr Hadley Huerta and patient should give the antibiotic time to work if this was caused by bacteria the antibiotics should take care of it . I told her dr Cadence Garcia advised to use warm salt water gargles also offered rx for magic mouth wash but patient said she is okay for now .

## 2020-12-31 RX ORDER — LEVOTHYROXINE SODIUM 100 UG/1
TABLET ORAL
Qty: 90 TAB | Refills: 1 | Status: SHIPPED | OUTPATIENT
Start: 2020-12-31

## 2023-06-26 NOTE — PATIENT INSTRUCTIONS
Influenza (Flu): Care Instructions  Your Care Instructions  Influenza (flu) is an infection in the lungs and breathing passages. It is caused by the influenza virus. There are different strains, or types, of the flu virus from year to year. Unlike the common cold, the flu comes on suddenly and the symptoms, such as a cough, congestion, fever, chills, fatigue, aches, and pains, are more severe. These symptoms may last up to 10 days. Although the flu can make you feel very sick, it usually doesn't cause serious health problems. Home treatment is usually all you need for flu symptoms. But your doctor may prescribe antiviral medicine to prevent other health problems, such as pneumonia, from developing. Older people and those who have a long-term health condition, such as lung disease, are most at risk for having pneumonia or other health problems. Follow-up care is a key part of your treatment and safety. Be sure to make and go to all appointments, and call your doctor if you are having problems. Its also a good idea to know your test results and keep a list of the medicines you take. How can you care for yourself at home? · Get plenty of rest.  · Drink plenty of fluids, enough so that your urine is light yellow or clear like water. If you have kidney, heart, or liver disease and have to limit fluids, talk with your doctor before you increase the amount of fluids you drink. · Take an over-the-counter pain medicine if needed, such as acetaminophen (Tylenol), ibuprofen (Advil, Motrin), or naproxen (Aleve), to relieve fever, headache, and muscle aches. Read and follow all instructions on the label. No one younger than 20 should take aspirin. It has been linked to Reye syndrome, a serious illness. · Do not smoke. Smoking can make the flu worse. If you need help quitting, talk to your doctor about stop-smoking programs and medicines. These can increase your chances of quitting for good.   · Breathe moist air from a hot shower or from a sink filled with hot water to help clear a stuffy nose. · Before you use cough and cold medicines, check the label. These medicines may not be safe for young children or for people with certain health problems. · If the skin around your nose and lips becomes sore, put some petroleum jelly on the area. · To ease coughing:  ¨ Drink fluids to soothe a scratchy throat. ¨ Suck on cough drops or plain hard candy. ¨ Take an over-the-counter cough medicine that contains dextromethorphan to help you get some sleep. Read and follow all instructions on the label. ¨ Raise your head at night with an extra pillow. This may help you rest if coughing keeps you awake. · Take any prescribed medicine exactly as directed. Call your doctor if you think you are having a problem with your medicine. To avoid spreading the flu  · Wash your hands regularly, and keep your hands away from your face. · Stay home from school, work, and other public places until you are feeling better and your fever has been gone for at least 24 hours. The fever needs to have gone away on its own without the help of medicine. · Ask people living with you to talk to their doctors about preventing the flu. They may get antiviral medicine to keep from getting the flu from you. · To prevent the flu in the future, get a flu vaccine every fall. Encourage people living with you to get the vaccine. · Cover your mouth when you cough or sneeze. When should you call for help? Call 911 anytime you think you may need emergency care. For example, call if:  · You have severe trouble breathing. Call your doctor now or seek immediate medical care if:  · You have new or worse trouble breathing. · You seem to be getting much sicker. · You feel very sleepy or confused. · You have a new or higher fever. · You get a new rash.   Watch closely for changes in your health, and be sure to contact your doctor if:  · You begin to get better and then get worse. · You are not getting better after 1 week. Where can you learn more? Go to http://celia-staci.info/. Enter G790 in the search box to learn more about \"Influenza (Flu): Care Instructions. \"  Current as of: May 23, 2016  Content Version: 11.2  © 8849-1431 [employer]. Care instructions adapted under license by [employer] (which disclaims liability or warranty for this information). If you have questions about a medical condition or this instruction, always ask your healthcare professional. [address] any warranty or liability for your use of this information. No
